# Patient Record
Sex: MALE | Race: WHITE | NOT HISPANIC OR LATINO | Employment: OTHER | URBAN - METROPOLITAN AREA
[De-identification: names, ages, dates, MRNs, and addresses within clinical notes are randomized per-mention and may not be internally consistent; named-entity substitution may affect disease eponyms.]

---

## 2020-09-03 ENCOUNTER — ASSISTED LIVING VISIT (OUTPATIENT)
Dept: GERIATRICS | Facility: CLINIC | Age: 85
End: 2020-09-03

## 2020-09-03 VITALS
HEIGHT: 68 IN | TEMPERATURE: 98.8 F | RESPIRATION RATE: 18 BRPM | WEIGHT: 166.2 LBS | BODY MASS INDEX: 25.19 KG/M2 | OXYGEN SATURATION: 98 % | DIASTOLIC BLOOD PRESSURE: 60 MMHG | SYSTOLIC BLOOD PRESSURE: 120 MMHG | HEART RATE: 60 BPM

## 2020-09-03 DIAGNOSIS — N40.0 BENIGN PROSTATIC HYPERPLASIA WITHOUT LOWER URINARY TRACT SYMPTOMS: ICD-10-CM
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
## 2020-09-03 DIAGNOSIS — M43.22 CERVICAL VERTEBRAL FUSION: ICD-10-CM

## 2020-09-03 DIAGNOSIS — F33.0 MILD EPISODE OF RECURRENT MAJOR DEPRESSIVE DISORDER (H): ICD-10-CM

## 2020-09-03 DIAGNOSIS — R29.6 RECURRENT FALLS: ICD-10-CM

## 2020-09-03 DIAGNOSIS — I48.20 CHRONIC ATRIAL FIBRILLATION (H): ICD-10-CM

## 2020-09-03 DIAGNOSIS — Z86.73 HISTORY OF CVA (CEREBROVASCULAR ACCIDENT): Primary | ICD-10-CM

## 2020-09-03 DIAGNOSIS — I69.952 FLACCID HEMIPLEGIA OF LEFT DOMINANT SIDE AS LATE EFFECT OF CEREBROVASCULAR DISEASE, UNSPECIFIED CEREBROVASCULAR DISEASE TYPE (H): ICD-10-CM

## 2020-09-03 DIAGNOSIS — S06.9X9D TRAUMATIC BRAIN INJURY WITH LOSS OF CONSCIOUSNESS, SUBSEQUENT ENCOUNTER: ICD-10-CM

## 2020-09-03 DIAGNOSIS — E03.9 HYPOTHYROIDISM, UNSPECIFIED TYPE: ICD-10-CM

## 2020-09-03 RX ORDER — TAMSULOSIN HYDROCHLORIDE 0.4 MG/1
0.4 CAPSULE ORAL DAILY
Qty: 30 CAPSULE | Refills: 11
Start: 2020-09-03 | End: 2020-12-18

## 2020-09-03 RX ORDER — WARFARIN SODIUM 1 MG/1
1 TABLET ORAL DAILY
Qty: 30 TABLET | Refills: 11
Start: 2020-09-03 | End: 2021-05-14

## 2020-09-03 RX ORDER — ATORVASTATIN CALCIUM 20 MG/1
20 TABLET, FILM COATED ORAL DAILY
Qty: 30 TABLET | Refills: 11
Start: 2020-09-03 | End: 2021-03-03

## 2020-09-03 RX ORDER — CITALOPRAM HYDROBROMIDE 20 MG/1
20 TABLET ORAL DAILY
Qty: 30 TABLET | Refills: 11
Start: 2020-09-03 | End: 2021-03-23

## 2020-09-03 RX ORDER — LEVOTHYROXINE SODIUM 75 UG/1
75 TABLET ORAL DAILY
Qty: 30 TABLET | Refills: 11
Start: 2020-09-03 | End: 2020-09-24

## 2020-09-03 RX ORDER — ACETAMINOPHEN 500 MG
1000 TABLET ORAL EVERY 8 HOURS PRN
Qty: 60 TABLET | Refills: 11
Start: 2020-09-03 | End: 2022-01-01

## 2020-09-03 ASSESSMENT — MIFFLIN-ST. JEOR: SCORE: 1413.38
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
## 2020-09-05 ASSESSMENT — MIFFLIN-ST. JEOR: SCORE: 1412.47

## 2020-09-08 ENCOUNTER — ASSISTED LIVING VISIT (OUTPATIENT)
Dept: GERIATRICS | Facility: CLINIC | Age: 85
End: 2020-09-08

## 2020-09-08 VITALS
TEMPERATURE: 98.8 F | DIASTOLIC BLOOD PRESSURE: 60 MMHG | OXYGEN SATURATION: 98 % | HEIGHT: 68 IN | HEART RATE: 60 BPM | RESPIRATION RATE: 18 BRPM | WEIGHT: 166 LBS | SYSTOLIC BLOOD PRESSURE: 120 MMHG | BODY MASS INDEX: 25.16 KG/M2

## 2020-09-08 DIAGNOSIS — I48.20 CHRONIC ATRIAL FIBRILLATION (H): Primary | ICD-10-CM

## 2020-09-08 DIAGNOSIS — Z79.01 LONG TERM CURRENT USE OF ANTICOAGULANT THERAPY: ICD-10-CM

## 2020-09-08 DIAGNOSIS — Z51.81 ENCOUNTER FOR THERAPEUTIC DRUG MONITORING: ICD-10-CM

## 2020-09-08 ASSESSMENT — MIFFLIN-ST. JEOR: SCORE: 1412.47

## 2020-09-08 NOTE — PROGRESS NOTES
"[place] [facility]  [facility] Medical Record Number: [id number]  Place of Service where encounter took place: [facility] ([facility] [[id number]]    HPI:    Sandeep Tapia is a 85 year old (10/27/1934), who is being seen today for an episodic care visit at the above location.  HPI information obtained from: facility chart records, facility staff, patient report and [facility] chart review. Today's concern is INR/Coumadin management for A. Fib    ROS/Subjective:  Bleeding Signs/Symptoms:  None  Thromboembolic Signs/Symptoms:  None  Medication Changes:  No  Dietary Changes:  No  Activity Changes: No  Bacterial/Viral Infection:  No  Missed Coumadin Doses:  None  On ASA: No  Other Concerns:  No    OBJECTIVE:  /60   Pulse 60   Temp 98.8  F (37.1  C)   Resp 18   Ht 1.727 m (5' 8\")   Wt 75.3 kg (166 lb)   SpO2 98%   BMI 25.24 kg/m    Last INR: 2.7 on 9/3  INR Today:  2.5  Current Dose:  5mg TWF, 4.5mg ROW  INR Flow sheet at [facility]:    ASSESSMENT:     Chronic atrial fibrillation (H)  Encounter for therapeutic drug monitoring  Long term current use of anticoagulant therapy  Therapeutic INR for goal of 2-3    PLAN:   Orders written by provider at facility  New Dose: No Change    Next INR: 9/21      Electronically signed by:  REBECCA Pisano CNP     "

## 2020-09-14 ENCOUNTER — ASSISTED LIVING VISIT (OUTPATIENT)
Dept: GERIATRICS | Facility: CLINIC | Age: 85
End: 2020-09-14

## 2020-09-14 VITALS
DIASTOLIC BLOOD PRESSURE: 60 MMHG | HEART RATE: 60 BPM | HEIGHT: 68 IN | TEMPERATURE: 98.8 F | OXYGEN SATURATION: 98 % | BODY MASS INDEX: 25.16 KG/M2 | SYSTOLIC BLOOD PRESSURE: 120 MMHG | RESPIRATION RATE: 18 BRPM | WEIGHT: 166 LBS

## 2020-09-14 DIAGNOSIS — Z79.01 LONG TERM CURRENT USE OF ANTICOAGULANT THERAPY: ICD-10-CM

## 2020-09-14 DIAGNOSIS — I48.20 CHRONIC ATRIAL FIBRILLATION (H): Primary | ICD-10-CM

## 2020-09-14 DIAGNOSIS — Z51.81 ENCOUNTER FOR THERAPEUTIC DRUG MONITORING: ICD-10-CM

## 2020-09-14 ASSESSMENT — MIFFLIN-ST. JEOR: SCORE: 1412.47

## 2020-09-14 NOTE — PROGRESS NOTES
"[place] [facility]  [place] Medical Record Number: [id number]  Place of Service where encounter took place: [facility] ([facility]) [[id number]]    HPI:    Sandeep Tapia is a 85 year old (10/27/1934), who is being seen today for an episodic care visit at the above location. HPI information obtained from: facility chart records, facility staff, patient report and [facility] chart review. Today's concern is INR/Coumadin management for A. Fib    ROS/Subjective:  Bleeding Signs/Symptoms:  None  Thromboembolic Signs/Symptoms:  None  Medication Changes:  No  Dietary Changes:  No  Activity Changes: No  Bacterial/Viral Infection:  No  Missed Coumadin Doses:  None  On ASA: No  Other Concerns:  No    OBJECTIVE:  /60   Pulse 60   Temp 98.8  F (37.1  C)   Resp 18   Ht 1.727 m (5' 8\")   Wt 75.3 kg (166 lb)   SpO2 98%   BMI 25.24 kg/m    Last INR: 2.5 on 9/8  INR Today:  2.8  Current Dose:  5mg TWF, 4.5mg ROW  INR Flow sheet at [facility]:    ASSESSMENT:     Chronic atrial fibrillation (H)  Long term current use of anticoagulant therapy  Encounter for therapeutic drug monitoring  Therapeutic INR for goal of 2-3    PLAN:   Orders written by provider at facility  New Dose: 2.5mg T/Th, 5mg ROW    Next INR: 9/21      Electronically signed by:  REBECCA Pisano CNP       "

## 2020-09-21 ENCOUNTER — TRANSFERRED RECORDS (OUTPATIENT)
Dept: HEALTH INFORMATION MANAGEMENT | Facility: CLINIC | Age: 85
End: 2020-09-21

## 2020-09-21 ENCOUNTER — ASSISTED LIVING VISIT (OUTPATIENT)
Dept: GERIATRICS | Facility: CLINIC | Age: 85
End: 2020-09-21

## 2020-09-21 VITALS
SYSTOLIC BLOOD PRESSURE: 120 MMHG | OXYGEN SATURATION: 98 % | TEMPERATURE: 98.8 F | WEIGHT: 166 LBS | HEART RATE: 60 BPM | RESPIRATION RATE: 18 BRPM | BODY MASS INDEX: 25.16 KG/M2 | HEIGHT: 68 IN | DIASTOLIC BLOOD PRESSURE: 60 MMHG

## 2020-09-21 DIAGNOSIS — Z79.01 LONG TERM CURRENT USE OF ANTICOAGULANT THERAPY: ICD-10-CM

## 2020-09-21 DIAGNOSIS — I48.20 CHRONIC ATRIAL FIBRILLATION (H): Primary | ICD-10-CM

## 2020-09-21 DIAGNOSIS — Z51.81 ENCOUNTER FOR THERAPEUTIC DRUG MONITORING: ICD-10-CM

## 2020-09-21 LAB
ANION GAP SERPL CALCULATED.3IONS-SCNC: 8 MMOL/L (ref 7–16)
BUN SERPL-MCNC: 23 MG/DL (ref 7–26)
CALCIUM SERPL-MCNC: 8.5 MG/DL (ref 8.4–10.4)
CHLORIDE SERPLBLD-SCNC: 108 MMOL/L (ref 98–109)
CO2 SERPL-SCNC: 22 MMOL/L (ref 20–29)
CREAT SERPL-MCNC: 1.58 MG/DL (ref 0.73–1.18)
GFR SERPL CREATININE-BSD FRML MDRD: 39 ML/MIN/1.73M2
GLUCOSE SERPL-MCNC: 93 MG/DL (ref 70–100)
HEMOGLOBIN: 9.9 G/DL (ref 13.5–17.5)
POTASSIUM SERPL-SCNC: 4 MMOL/L (ref 3.5–5.1)
SODIUM SERPL-SCNC: 138 MMOL/L (ref 136–145)
TSH SERPL-ACNC: 0.79 UIU/ML (ref 0.3–4.5)

## 2020-09-21 ASSESSMENT — MIFFLIN-ST. JEOR: SCORE: 1412.47

## 2020-09-21 NOTE — PROGRESS NOTES
"[place] [facility]  [place] Medical Record Number: [id number]  Place of Service where encounter took place: [facility] ([facility]) [[id number]]    HPI: Sandeep Tapia is a 85 year old (10/27/1934), who is being seen today for an episodic care visit at the above location. HPI information obtained from: facility chart records, facility staff, patient report and [facility] chart review. Today's concern is INR/Coumadin management for A. Fib    ROS/Subjective:  Bleeding Signs/Symptoms:  None  Thromboembolic Signs/Symptoms:  None  Medication Changes:  No  Dietary Changes:  No  Activity Changes: No  Bacterial/Viral Infection:  No  Missed Coumadin Doses:  None  On ASA: No  Other Concerns:  No    OBJECTIVE:  /60   Pulse 60   Temp 98.8  F (37.1  C)   Resp 18   Ht 1.727 m (5' 8\")   Wt 75.3 kg (166 lb)   SpO2 98%   BMI 25.24 kg/m    Last INR: 2.8 on 9/8  INR Today:  2.5  Current Dose:  2.5mg T/Th, 5mg ROW  INR Flow sheet at [facility]:    ASSESSMENT:     Chronic atrial fibrillation (H)  Encounter for therapeutic drug monitoring  Long term current use of anticoagulant therapy  Therapeutic INR for goal of 2-3    PLAN:   Orders written by provider at facility  New Dose: No Change    Next INR: 10/5      Electronically signed by:  REBECCA Pisano CNP     "

## 2020-09-24 ENCOUNTER — ASSISTED LIVING VISIT (OUTPATIENT)
Dept: GERIATRICS | Facility: CLINIC | Age: 85
End: 2020-09-24

## 2020-09-24 VITALS
WEIGHT: 166 LBS | DIASTOLIC BLOOD PRESSURE: 60 MMHG | SYSTOLIC BLOOD PRESSURE: 120 MMHG | HEART RATE: 60 BPM | TEMPERATURE: 98.8 F | HEIGHT: 68 IN | OXYGEN SATURATION: 98 % | BODY MASS INDEX: 25.16 KG/M2 | RESPIRATION RATE: 18 BRPM

## 2020-09-24 DIAGNOSIS — E03.9 HYPOTHYROIDISM, UNSPECIFIED TYPE: Primary | ICD-10-CM

## 2020-09-24 DIAGNOSIS — N18.30 CKD (CHRONIC KIDNEY DISEASE) STAGE 3, GFR 30-59 ML/MIN (H): ICD-10-CM

## 2020-09-24 DIAGNOSIS — D64.9 ANEMIA, UNSPECIFIED TYPE: ICD-10-CM

## 2020-09-24 PROBLEM — S06.9XAS COGNITIVE DEFICIT AS LATE EFFECT OF TRAUMATIC BRAIN INJURY (H): Status: ACTIVE | Noted: 2020-09-24

## 2020-09-24 PROBLEM — R41.89 COGNITIVE DEFICIT AS LATE EFFECT OF TRAUMATIC BRAIN INJURY (H): Status: ACTIVE | Noted: 2020-09-24

## 2020-09-24 RX ORDER — LEVOTHYROXINE SODIUM 50 UG/1
50 TABLET ORAL DAILY
Qty: 30 TABLET | Refills: 11
Start: 2020-09-24 | End: 2022-01-01 | Stop reason: ALTCHOICE

## 2020-09-24 ASSESSMENT — MIFFLIN-ST. JEOR: SCORE: 1412.47

## 2020-09-24 NOTE — PROGRESS NOTES
"[place] [facility]  [place] Medical Record Number: [id number]  [facility] Service where encounter took place[facility] [[id number]]  Chief Complaint   Patient presents with     RECHECK       HPI:    Sandeep Tapia is a 85 year old (10/27/1934), who is being seen today for an episodic care visit. HPI information obtained from: facility chart records, facility staff, patient report and [facility] chart review. Today's concern is:     Hypothyroidism, unspecified type  CKD (chronic kidney disease) stage 3, GFR 30-59 ml/min  Anemia, unspecified type     Patient seen today for follow up, SLUMS 21/30, uses cane to ambulate, labs on 9/21 reviewed with Hgb 9.9, TSH 0.79, GFR 39, is asymptomatic without weakness/lethargy, skin tone WNL.    Past Medical and Surgical History reviewed in Epic today.    MEDICATIONS:    Current Outpatient Medications   Medication Sig Dispense Refill     levothyroxine (SYNTHROID/LEVOTHROID) 50 MCG tablet Take 1 tablet (50 mcg) by mouth daily 30 tablet 11     acetaminophen (TYLENOL) 500 MG tablet Take 2 tablets (1,000 mg) by mouth every 8 hours as needed for mild pain 60 tablet 11     atorvastatin (LIPITOR) 20 MG tablet Take 1 tablet (20 mg) by mouth daily 30 tablet 11     citalopram (CELEXA) 20 MG tablet Take 1 tablet (20 mg) by mouth daily 30 tablet 11     tamsulosin (FLOMAX) 0.4 MG capsule Take 1 capsule (0.4 mg) by mouth daily 30 capsule 11     warfarin ANTICOAGULANT (COUMADIN) 1 MG tablet Take 1 tablet (1 mg) by mouth daily 30 tablet 11     REVIEW OF SYSTEMS:  4 point ROS including Respiratory, CV, GI and , other than that noted in the HPI,  is negative    Objective:  /60   Pulse 60   Temp 98.8  F (37.1  C)   Resp 18   Ht 1.727 m (5' 8\")   Wt 75.3 kg (166 lb)   SpO2 98%   BMI 25.24 kg/m    Exam:  GENERAL APPEARANCE:  in no distress, appears healthy  ENT:  normal hearing acuity  RESP:  no respiratory distress  CV:  peripheral edema mild+ in " lower legs  ABDOMEN:  no distension  M/S:   Gait and station abnormal using cane  SKIN:  Inspection of skin and subcutaneous tissue baseline  NEURO:   Examination of sensation by touch normal  PSYCH:  oriented X 3, memory impaired     Labs:   Labs done in SNF are in [facility] EPIC. Please refer to them using EPIC/Care Everywhere.    ASSESSMENT/PLAN:  Hypothyroidism, unspecified type  -TSH 9/21 was 0/79  -goal range 2-6  -decrease levothyroxine from 75 to 50mcg  -follow up TSH in 6-12 months    CKD (chronic kidney disease) stage 3, GFR 30-59 ml/min  -labs 9/21 Creat 1.58, GFR 39  -dose medications renally as possible  -consider reduction of lipitor to 10mg  -follow up BMP in 6-12 months    Anemia, unspecified type  -Hgb on 9/21 was 9.9  -asymptomatic, no previous records  -will contact family RE: Hx of anemia      Electronically signed by:  REBECCA Pisano CNP

## 2020-09-29 VITALS
SYSTOLIC BLOOD PRESSURE: 120 MMHG | DIASTOLIC BLOOD PRESSURE: 60 MMHG | TEMPERATURE: 98.8 F | HEART RATE: 60 BPM | BODY MASS INDEX: 25.16 KG/M2 | RESPIRATION RATE: 18 BRPM | WEIGHT: 166 LBS | OXYGEN SATURATION: 98 % | HEIGHT: 68 IN

## 2020-09-29 NOTE — PROGRESS NOTES
"[place] [facility]  [place] Medical Record Number: [id number]  Place of Service where encounter took place: [facility] ([facility]) [[id number]]    HPI:    Sandeep Tapia is a 85 year old (10/27/1934), who is being seen today for an episodic care visit at the above location. HPI information obtained from: facility chart records, facility staff, patient report and [facility] chart review. Today's concern is INR/Coumadin management for A. Fib    ROS/Subjective:  Bleeding Signs/Symptoms:  None  Thromboembolic Signs/Symptoms:  None  Medication Changes:  No  Dietary Changes:  No  Activity Changes: No  Bacterial/Viral Infection:  No  Missed Coumadin Doses:  None  On ASA: No  Other Concerns:  No    OBJECTIVE:  /60   Pulse 60   Temp 98.8  F (37.1  C)   Resp 18   Ht 1.727 m (5' 8\")   Wt 75.3 kg (166 lb)   SpO2 98%   BMI 25.24 kg/m    Last INR: 2.6 on 9/21  INR Today:  2.6  Current Dose:  2.5mg T/Th, 5mg ROW  INR Flow sheet at [facility]:    ASSESSMENT:     Chronic atrial fibrillation (H)  Encounter for therapeutic drug monitoring  Long term current use of anticoagulant therapy  Therapeutic INR for goal of 2-3    PLAN:   Orders written by provider at facility  New Dose: No Change    Next INR: 10/5      Electronically signed by:  REBECCA Pisano CNP     "

## 2020-09-30 ENCOUNTER — DOCUMENTATION ONLY (OUTPATIENT)
Dept: OTHER | Facility: CLINIC | Age: 85
End: 2020-09-30

## 2020-09-30 ENCOUNTER — ASSISTED LIVING VISIT (OUTPATIENT)
Dept: GERIATRICS | Facility: CLINIC | Age: 85
End: 2020-09-30

## 2020-09-30 DIAGNOSIS — Z51.81 ENCOUNTER FOR THERAPEUTIC DRUG MONITORING: ICD-10-CM

## 2020-09-30 DIAGNOSIS — I48.20 CHRONIC ATRIAL FIBRILLATION (H): Primary | ICD-10-CM

## 2020-09-30 DIAGNOSIS — Z79.01 LONG TERM CURRENT USE OF ANTICOAGULANT THERAPY: ICD-10-CM

## 2020-09-30 ASSESSMENT — MIFFLIN-ST. JEOR: SCORE: 1412.47

## 2020-10-01 ASSESSMENT — MIFFLIN-ST. JEOR
SCORE: 1385.7

## 2020-10-05 ENCOUNTER — ASSISTED LIVING VISIT (OUTPATIENT)
Dept: GERIATRICS | Facility: CLINIC | Age: 85
End: 2020-10-05
Payer: COMMERCIAL

## 2020-10-05 VITALS
SYSTOLIC BLOOD PRESSURE: 120 MMHG | OXYGEN SATURATION: 98 % | RESPIRATION RATE: 18 BRPM | WEIGHT: 166 LBS | DIASTOLIC BLOOD PRESSURE: 60 MMHG | BODY MASS INDEX: 25.16 KG/M2 | TEMPERATURE: 98.8 F | HEART RATE: 60 BPM | HEIGHT: 68 IN

## 2020-10-05 DIAGNOSIS — I48.20 CHRONIC ATRIAL FIBRILLATION (H): Primary | ICD-10-CM

## 2020-10-05 DIAGNOSIS — Z51.81 ENCOUNTER FOR THERAPEUTIC DRUG MONITORING: ICD-10-CM

## 2020-10-05 DIAGNOSIS — Z79.01 LONG TERM CURRENT USE OF ANTICOAGULANT THERAPY: ICD-10-CM

## 2020-10-05 ASSESSMENT — MIFFLIN-ST. JEOR: SCORE: 1412.47

## 2020-10-05 NOTE — PROGRESS NOTES
"[place] [facility]  [place] Medical Record Number: [id number]  Place of Service where encounter took place: [facility] ([facility]) [[id number]]    HPI:    Sandeep Tapia is a 85 year old (10/27/1934), who is being seen today for an episodic care visit at the above location. HPI information obtained from: facility chart records, facility staff, patient report and [facility] chart review. Today's concern is INR/Coumadin management for A. Fib    ROS/Subjective:  Bleeding Signs/Symptoms:  None  Thromboembolic Signs/Symptoms:  None  Medication Changes:  No  Dietary Changes:  No  Activity Changes: No  Bacterial/Viral Infection:  No  Missed Coumadin Doses:  None  On ASA: No  Other Concerns:  No    OBJECTIVE:  /60   Pulse 60   Temp 98.8  F (37.1  C)   Resp 18   Ht 1.727 m (5' 8\")   Wt 75.3 kg (166 lb)   SpO2 98%   BMI 25.24 kg/m    Last INR: 2.6 on 9/28  INR Today:  2,3  Current Dose:  2.5mg T/Th, 5mg ROW  INR Flow sheet at [facility]:    ASSESSMENT:     Chronic atrial fibrillation (H)  Encounter for therapeutic drug monitoring  Long term current use of anticoagulant therapy  Therapeutic INR for goal of 2-3    PLAN:   Orders written by provider at facility  New Dose: Same    Next INR: 10/19      Electronically signed by:  REBECCA Pisano CNP     "

## 2020-10-19 ENCOUNTER — ASSISTED LIVING VISIT (OUTPATIENT)
Dept: GERIATRICS | Facility: CLINIC | Age: 85
End: 2020-10-19
Payer: COMMERCIAL

## 2020-10-19 VITALS
DIASTOLIC BLOOD PRESSURE: 60 MMHG | OXYGEN SATURATION: 98 % | BODY MASS INDEX: 25.16 KG/M2 | RESPIRATION RATE: 18 BRPM | SYSTOLIC BLOOD PRESSURE: 120 MMHG | WEIGHT: 166 LBS | HEIGHT: 68 IN | TEMPERATURE: 98.8 F | HEART RATE: 60 BPM

## 2020-10-19 DIAGNOSIS — I48.20 CHRONIC ATRIAL FIBRILLATION (H): Primary | ICD-10-CM

## 2020-10-19 DIAGNOSIS — Z51.81 ENCOUNTER FOR THERAPEUTIC DRUG MONITORING: ICD-10-CM

## 2020-10-19 DIAGNOSIS — Z79.01 LONG TERM CURRENT USE OF ANTICOAGULANT THERAPY: ICD-10-CM

## 2020-10-19 NOTE — PROGRESS NOTES
"[place] [facility]  [place] Medical Record Number: [id number]  Place of Service where encounter took place: [facility] ([facility]) [[id number]]    HPI:    Sandeep Tapia is a 85 year old (10/27/1934), who is being seen today for an episodic care visit at the above location. HPI information obtained from: facility chart records, facility staff, patient report and [facility] chart review. Today's concern is INR/Coumadin management for A. Fib    ROS/Subjective:  Bleeding Signs/Symptoms:  None  Thromboembolic Signs/Symptoms:  None  Medication Changes:  No  Dietary Changes:  No  Activity Changes: No  Bacterial/Viral Infection:  No  Missed Coumadin Doses:  None  On ASA: No  Other Concerns:  No    OBJECTIVE:  /60   Pulse 60   Temp 98.8  F (37.1  C)   Resp 18   Ht 1.727 m (5' 8\")   Wt 75.3 kg (166 lb)   SpO2 98%   BMI 25.24 kg/m    Last INR: 2.1 on 10/12  INR Today:  1.9  Current Dose:  2.5mg T/Th, 5mg ROW  INR Flow sheet at [facility]:    ASSESSMENT:     Chronic atrial fibrillation (H)  Encounter for therapeutic drug monitoring  Long term current use of anticoagulant therapy  Therapeutic INR for goal of 2-3    PLAN:   Orders written by provider at facility  New Dose: 2.5mg W, 5mg ROW    Next INR: 11/2      Electronically signed by:  REBECCA Pisano CNP     "

## 2020-10-20 ENCOUNTER — VIRTUAL VISIT (OUTPATIENT)
Dept: GERIATRICS | Facility: CLINIC | Age: 85
End: 2020-10-20
Payer: COMMERCIAL

## 2020-10-20 ENCOUNTER — HOSPITAL ENCOUNTER (EMERGENCY)
Facility: CLINIC | Age: 85
Discharge: HOME | End: 2020-10-20
Attending: EMERGENCY MEDICINE | Admitting: EMERGENCY MEDICINE
Payer: COMMERCIAL

## 2020-10-20 VITALS
BODY MASS INDEX: 25.16 KG/M2 | HEIGHT: 68 IN | OXYGEN SATURATION: 98 % | WEIGHT: 166 LBS | TEMPERATURE: 98.8 F | DIASTOLIC BLOOD PRESSURE: 60 MMHG | RESPIRATION RATE: 18 BRPM | SYSTOLIC BLOOD PRESSURE: 120 MMHG | HEART RATE: 60 BPM

## 2020-10-20 VITALS
RESPIRATION RATE: 18 BRPM | DIASTOLIC BLOOD PRESSURE: 60 MMHG | OXYGEN SATURATION: 98 % | SYSTOLIC BLOOD PRESSURE: 120 MMHG | HEIGHT: 68 IN | HEART RATE: 60 BPM | BODY MASS INDEX: 25.16 KG/M2 | TEMPERATURE: 98.8 F | WEIGHT: 166 LBS

## 2020-10-20 VITALS
SYSTOLIC BLOOD PRESSURE: 158 MMHG | TEMPERATURE: 98.1 F | OXYGEN SATURATION: 100 % | HEART RATE: 62 BPM | DIASTOLIC BLOOD PRESSURE: 62 MMHG | RESPIRATION RATE: 18 BRPM

## 2020-10-20 DIAGNOSIS — S01.311A LACERATION OF RIGHT EAR LOBE, INITIAL ENCOUNTER: ICD-10-CM

## 2020-10-20 DIAGNOSIS — G44.309 POST-TRAUMATIC HEADACHE, NOT INTRACTABLE, UNSPECIFIED CHRONICITY PATTERN: ICD-10-CM

## 2020-10-20 DIAGNOSIS — S06.9XAS COGNITIVE DEFICIT AS LATE EFFECT OF TRAUMATIC BRAIN INJURY (H): Primary | ICD-10-CM

## 2020-10-20 DIAGNOSIS — S56.912A STRAIN OF LEFT FOREARM, INITIAL ENCOUNTER: ICD-10-CM

## 2020-10-20 DIAGNOSIS — S01.311A LACERATION OF RIGHT EAR, INITIAL ENCOUNTER: ICD-10-CM

## 2020-10-20 DIAGNOSIS — R41.89 COGNITIVE DEFICIT AS LATE EFFECT OF TRAUMATIC BRAIN INJURY (H): Primary | ICD-10-CM

## 2020-10-20 DIAGNOSIS — S00.432A HEMATOMA OF LEFT EAR, INITIAL ENCOUNTER: ICD-10-CM

## 2020-10-20 PROCEDURE — 99207 PR CDG-MDM COMPONENT: MEETS LOW - DOWN CODED: CPT | Performed by: NURSE PRACTITIONER

## 2020-10-20 PROCEDURE — 250N000011 HC RX IP 250 OP 636: Performed by: EMERGENCY MEDICINE

## 2020-10-20 PROCEDURE — 99283 EMERGENCY DEPT VISIT LOW MDM: CPT

## 2020-10-20 PROCEDURE — 12013 RPR F/E/E/N/L/M 2.6-5.0 CM: CPT

## 2020-10-20 PROCEDURE — 90715 TDAP VACCINE 7 YRS/> IM: CPT | Performed by: EMERGENCY MEDICINE

## 2020-10-20 PROCEDURE — 250N000013 HC RX MED GY IP 250 OP 250 PS 637: Performed by: EMERGENCY MEDICINE

## 2020-10-20 PROCEDURE — 90471 IMMUNIZATION ADMIN: CPT | Performed by: EMERGENCY MEDICINE

## 2020-10-20 RX ORDER — CEPHALEXIN 500 MG/1
500 CAPSULE ORAL ONCE
Status: COMPLETED | OUTPATIENT
Start: 2020-10-20 | End: 2020-10-20

## 2020-10-20 RX ORDER — CEPHALEXIN 500 MG/1
500 CAPSULE ORAL 4 TIMES DAILY
Qty: 28 CAPSULE | Refills: 0 | Status: SHIPPED | OUTPATIENT
Start: 2020-10-20 | End: 2020-10-27

## 2020-10-20 RX ADMIN — CLOSTRIDIUM TETANI TOXOID ANTIGEN (FORMALDEHYDE INACTIVATED), CORYNEBACTERIUM DIPHTHERIAE TOXOID ANTIGEN (FORMALDEHYDE INACTIVATED), BORDETELLA PERTUSSIS TOXOID ANTIGEN (GLUTARALDEHYDE INACTIVATED), BORDETELLA PERTUSSIS FILAMENTOUS HEMAGGLUTININ ANTIGEN (FORMALDEHYDE INACTIVATED), BORDETELLA PERTUSSIS PERTACTIN ANTIGEN, AND BORDETELLA PERTUSSIS FIMBRIAE 2/3 ANTIGEN 0.5 ML: 5; 2; 2.5; 5; 3; 5 INJECTION, SUSPENSION INTRAMUSCULAR at 12:39

## 2020-10-20 RX ADMIN — CEPHALEXIN 500 MG: 500 CAPSULE ORAL at 12:39

## 2020-10-20 ASSESSMENT — ENCOUNTER SYMPTOMS
ARTHRALGIAS: 1
NECK STIFFNESS: 1
WOUND: 1
ABDOMINAL PAIN: 0

## 2020-10-20 ASSESSMENT — MIFFLIN-ST. JEOR
SCORE: 1412.47
SCORE: 1412.47

## 2020-10-20 NOTE — PROGRESS NOTES
"[place] [facility]   Sandeep Tapia is being evaluated via a billable video visit.   The patient has been notified of following:  \"This video visit will be conducted via a call between you and your provider. We have found that certain health care needs can be provided without the need for an in-person physical exam.  This service lets us provide the care you need with a video conversation. If during the course of the call the provider feels a video visit is not appropriate, you will not be charged for this service.\"   The provider has received verbal consent for a Video Visit from the patient or first contact? Yes  Patient  or facility staff would like the video invitation sent by: N/A   Video Start Time: 1101  [place] Medical Record Number: [id number]  Place of Location at the time of visit: [facility]   Chief Complaint   Patient presents with     Video Visit     HPI: Sandeep Tapia is a 85 year old (10/27/1934), who is being seen today for a visit. HPI information obtained from: facility chart records, facility staff, patient report and [facility] chart review. 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     Past Medical and Surgical History reviewed in Epic today.  MEDICATIONS:    Current Outpatient Medications   Medication Sig Dispense Refill     acetaminophen (TYLENOL) 500 MG tablet Take 2 tablets (1,000 mg) by mouth every 8 hours " "as needed for mild pain 60 tablet 11     atorvastatin (LIPITOR) 20 MG tablet Take 1 tablet (20 mg) by mouth daily 30 tablet 11     cephALEXin (KEFLEX) 500 MG capsule Take 1 capsule (500 mg) by mouth 4 times daily for 7 days 28 capsule 0     citalopram (CELEXA) 20 MG tablet Take 1 tablet (20 mg) by mouth daily 30 tablet 11     levothyroxine (SYNTHROID/LEVOTHROID) 50 MCG tablet Take 1 tablet (50 mcg) by mouth daily 30 tablet 11     tamsulosin (FLOMAX) 0.4 MG capsule Take 1 capsule (0.4 mg) by mouth daily 30 capsule 11     warfarin ANTICOAGULANT (COUMADIN) 1 MG tablet Take 1 tablet (1 mg) by mouth daily 30 tablet 11     REVIEW OF SYSTEMS: Limited secondary to cognitive impairment but today pt reports I think I am fine  Objective: /60   Pulse 60   Temp 98.8  F (37.1  C)   Resp 18   Ht 1.727 m (5' 8\")   Wt 75.3 kg (166 lb)   SpO2 98%   BMI 25.24 kg/m    Limited visit exam done given COVID-19 precautions.   Exam: as in H&P, otherwise deferred r/t covid  Labs:   Labs done in SNF are in [place] EPIC. Please refer to them using [employer]/Care Everywhere.    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

## 2020-10-20 NOTE — PROGRESS NOTES
[place] [facility]  [place] Medical Record Number: [id number]  Place of Service where encounter took place: [facility] ([facility]) [[id number]]  Chief Complaint   Patient presents with     RECHECK       HPI:    Sandeep Tapia is a 85 year old (10/27/1934), who is being seen today for an episodic care visit. HPI information obtained from: facility chart records, facility staff, patient report and [facility] chart review. Today's concern is:     Fall, subsequent encounter  Laceration of right ear lobe, subsequent encounter  Hematoma of left ear, subsequent encounter     Patient on 10/20 slipped/fell and hit R ear on door, split ear lobe posterior and anterior plus has large hematoma at ana laura/antihelix area that was unable to be drained, sent to ER for sutures, pain+ with exam and cleaning up this am, no s/s infection, no additional discharge, hearing intact, concerned for area and cares due to dementia.    Past Medical and Surgical History reviewed in Epic today.    MEDICATIONS:    Current Outpatient Medications   Medication Sig Dispense Refill     acetaminophen (TYLENOL) 500 MG tablet Take 2 tablets (1,000 mg) by mouth every 8 hours as needed for mild pain 60 tablet 11     atorvastatin (LIPITOR) 20 MG tablet Take 1 tablet (20 mg) by mouth daily 30 tablet 11     cephALEXin (KEFLEX) 500 MG capsule Take 1 capsule (500 mg) by mouth 4 times daily for 7 days 28 capsule 0     citalopram (CELEXA) 20 MG tablet Take 1 tablet (20 mg) by mouth daily 30 tablet 11     levothyroxine (SYNTHROID/LEVOTHROID) 50 MCG tablet Take 1 tablet (50 mcg) by mouth daily 30 tablet 11     tamsulosin (FLOMAX) 0.4 MG capsule Take 1 capsule (0.4 mg) by mouth daily 30 capsule 11     warfarin ANTICOAGULANT (COUMADIN) 1 MG tablet Take 1 tablet (1 mg) by mouth daily 30 tablet 11     REVIEW OF SYSTEMS:  4 point ROS including Respiratory, CV, GI and , other than that noted in the HPI,  is negative    Objective:  /60    "Pulse 60   Temp 98.8  F (37.1  C)   Resp 18   Ht 1.727 m (5' 8\")   Wt 75.3 kg (166 lb)   SpO2 98%   BMI 25.24 kg/m    Exam:  GENERAL APPEARANCE:  in no distress, appears healthy  ENT:  Mouth and posterior oropharynx normal, moist mucous membranes  RESP:  lungs clear to auscultation   CV:  no edema, rate-normal  ABDOMEN:  bowel sounds normal  M/S:   Gait and station abnormal using cane, unsteady  SKIN:  Inspection of skin and subcutaneous tissueas in HPI  NEURO:   Examination of sensation by touch normal  PSYCH:  oriented X 3, memory impaired     Labs:   Labs done in SNF are in [place] EPIC. Please refer to them using [employer]/Care Everywhere.    ASSESSMENT/PLAN:  Fall, subsequent encounter  Laceration of right ear lobe, subsequent encounter  Hematoma of left ear, subsequent encounter  -add APAP 1000mg BID x7 days  -continue keflex 500mg QID x7 days total  -staff to remove sutures on 10/29  -family to decide follow up ENT  as suggested by ER  -daily dressing changes; cleanse, apply bacitracin to suture areas, cover ABD, wrap kerlix       Electronically signed by:  REBECCA Pisano CNP     "

## 2020-10-20 NOTE — ED PROVIDER NOTES
History     Chief Complaint:  Ear Injury     The history is provided by the patient.      Sandeep Tapia is a 85 year old male on Coumadin with a history of CKD, stroke, and recurrent falls, who presents via EMS for evaluation after fall. The patient reports that he was urinating this morning, and lost his balance when he turned around. He stumbled and hit his head as he fell against the wall or towel bar, but the patient does not remember specifically. Patient denies falling down to the ground or any loss of consciousness. He grabbed his cane, with which he ambulates due to balance issues, and started walking back to his bedroom when he realized he was bleeding from his right ear. The patient applied a cold wet towel to his ear to help control the bleeding. He called EMS when he was still in pain after returning to bed. Here, the patient complains of left wrist pain in addition to right ear pain, but notes that he is primarily concerned with his ear. He does have laceration to his right ear from the fall. He also mentions trouble with turning his head, but this is at baseline for him. The patient denies any chest or abdominal pain. No immunization records on file regarding tetanus status.     Allergies:  No Known Drug Allergies    Medications:    Atorvastatin  Citalopram  Levothyroxine  Tamsulosin  Coumadin    Past Medical History:    CKD stage 3  Anemia  Recurrent falls  Benign prostatic hyperplasia  Hypothyroidism  Chronic atrial fibrillation  TBI with LOSS OF CONSCIOUSNESS   Flaccid hemiplegia of left dominant side  CVA    Past Surgical History:    History reviewed. No pertinent surgical history.    Family History:    History reviewed. No pertinent family history.     Social History:  The patient was accompanied to the ED by EMS.  Smoking Status: Former smoker  Smokeless Tobacco: Never used  Alcohol Use: Not currently  Marital Status:      Review of Systems   HENT: Positive for ear pain (R ear).     Cardiovascular: Negative for chest pain.   Gastrointestinal: Negative for abdominal pain.   Musculoskeletal: Positive for arthralgias (L wrist) and neck stiffness (at baseline per patient).   Skin: Positive for wound (R ear).   Neurological: Negative for syncope.   All other systems reviewed and are negative.    Physical Exam     Patient Vitals for the past 24 hrs:   BP Temp Temp src Pulse Resp SpO2   10/20/20 1027 (!) 158/62 98.1  F (36.7  C) Oral 62 18 100 %       Physical Exam  Constitutional: Elderly white male, supine.  HENT: Right ear 3 cm full thickness laceration of lobule anteriorally and 2 cm laceration of lobule posteriorally. Large central hematoma within ana laura. EAC reveals wax. Diffuse swelling of external ear.  Eyes: EOM are normal. Pupils are equal, round, and reactive to light.   Neck: Normal range of motion. No JVD present. No cervical adenopathy. No posterior midline tenderness.  Cardiovascular: Regular rhythm.  Exam reveals no gallop and no friction rub.    No murmur heard.  Pulmonary/Chest: Bilateral breath sounds normal. No wheezes, rhonchi or rales.  Abdominal: Soft. No tenderness. No rebound or guarding.   Musculoskeletal: No edema.  no Left wrist or snuffbox tenderness. Mild tenderness of proximal extensor tendons without any bony tenderness.  Lymphadenopathy: No lymphadenopathy.   Neurological: Alert and oriented to person, place, and time. Normal strength. Coordination normal. GCS 15.  Skin: Skin is warm and dry. No rash noted. No erythema.     Emergency Department Course     Procedures  Ear laceration repair  5 cm total  0.5 % Sensorcaine was used for used for an aural block. Once the block was obtained, the posterior laceration was copiously irrigated. Cartilage was seen but did not appear to be torn. After irrigation, this was closed with 5.0 Ethylon. The front of the lobule was then examined. Again, cartilage was seen and exposed but did not appear to be torn. This was copiously  irrigated and closed with 5.0 Ethylon. For the hematoma in the ana laura I made a stab incision to attempt and drain blood, however, the hematoma had already solidified. Bacitracin, adaptic gauze, and a mastoid dressing was then placed.      Interventions:  1239 Keflex 500 mg PO  1239 Adacel 0.5 mLs IM    Emergency Department Course:  Past medical records, nursing notes, and vitals reviewed.    1031 I performed an exam of the patient as documented above.     1129 I performed a laceration repair on the patient's right ear, as noted above.    1215 I rechecked the patient and discussed the results of his workup thus far.     Findings and plan explained to the Patient. Patient discharged home with instructions regarding supportive care, medications, and reasons to return. The importance of close follow-up was reviewed. The patient was prescribed Keflex.    I personally answered all related questions prior to discharge.     Impression & Plan         Medical Decision Making:  Sandeep Tapia is a 85 year old male who slipped and hit his right ear against a door which caused a laceration and swelling. He stats he did not fall. He has a little soreness in the left forearm because he tried to push off when he started to lean. He has no tenderness in that wrist whatsoever and there is some mild tenderness of the extensor tendons of the forearm but no sign of fracture. Patient was given a Keflex tablet. His tetanus status was checked and updated.I recommended that he stay on the Keflex, use cold compresses, and follow-up with ENT in two days to have mastoid dressing removed and the wound checked. I referred him to Dr. Valentin for this.  sutures should come out in  one week.    Diagnosis:    ICD-10-CM    1. Laceration of right ear lobe, initial encounter  S01.311A    2. Hematoma of left ear, initial encounter  S00.432A    3. Strain of left forearm, initial encounter  S56.912A        Disposition:  Discharged to home.    Discharge  Medications:  New Prescriptions    CEPHALEXIN (KEFLEX) 500 MG CAPSULE    Take 1 capsule (500 mg) by mouth 4 times daily for 7 days       Scribe Disclosure:  I, Comfort Ovalle, am serving as a scribe at 10:31 AM on 10/20/2020 to document services personally performed by Mark Lopez MD based on my observations and the provider's statements to me.     Comfort Ovalle  10/20/2020   [facility] EMERGENCY DEPT       Mark Lopez MD  10/20/20 8559

## 2020-10-20 NOTE — ED NOTES
I called [facility] to inform that patient will be returning.  Report given to JENNIFER. I also spoke with daughter Saranya.

## 2020-10-20 NOTE — ED AVS SNAPSHOT
[facility] Emergency Dept  [address] [address]  Phone: [phone]  Fax: [phone]                                    Sandeep Tapia   MRN: [id number]    Department: [facility] Emergency Dept   Date of Visit: 10/20/2020           After Visit Summary Signature Page    I have received my discharge instructions, and my questions have been answered. I have discussed any challenges I see with this plan with the nurse or doctor.    ..........................................................................................................................................  Patient/Patient Representative Signature      ..........................................................................................................................................  Patient Representative Print Name and Relationship to Patient    ..................................................               ................................................  Date                                   Time    ..........................................................................................................................................  Reviewed by Signature/Title    ...................................................              ..............................................  Date                                               Time          22EPIC Rev 08/18

## 2020-10-21 ENCOUNTER — ASSISTED LIVING VISIT (OUTPATIENT)
Dept: GERIATRICS | Facility: CLINIC | Age: 85
End: 2020-10-21
Payer: COMMERCIAL

## 2020-10-21 DIAGNOSIS — S01.311D LACERATION OF RIGHT EAR LOBE, SUBSEQUENT ENCOUNTER: ICD-10-CM

## 2020-10-21 DIAGNOSIS — S00.432D HEMATOMA OF LEFT EAR, SUBSEQUENT ENCOUNTER: ICD-10-CM
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
## 2020-11-02 ENCOUNTER — ASSISTED LIVING VISIT (OUTPATIENT)
Dept: GERIATRICS | Facility: CLINIC | Age: 85
End: 2020-11-02
Payer: COMMERCIAL

## 2020-11-02 VITALS
BODY MASS INDEX: 24.43 KG/M2 | WEIGHT: 161.2 LBS | DIASTOLIC BLOOD PRESSURE: 67 MMHG | HEIGHT: 68 IN | TEMPERATURE: 97.5 F | OXYGEN SATURATION: 96 % | SYSTOLIC BLOOD PRESSURE: 125 MMHG | HEART RATE: 58 BPM | RESPIRATION RATE: 18 BRPM

## 2020-11-02 DIAGNOSIS — Z79.01 LONG TERM CURRENT USE OF ANTICOAGULANT THERAPY: ICD-10-CM

## 2020-11-02 DIAGNOSIS — I48.20 CHRONIC ATRIAL FIBRILLATION (H): Primary | ICD-10-CM

## 2020-11-02 DIAGNOSIS — Z51.81 ENCOUNTER FOR THERAPEUTIC DRUG MONITORING: ICD-10-CM

## 2020-11-02 NOTE — PROGRESS NOTES
"[place] [facility]  [place] Medical Record Number: [id number]  Place of Service where encounter took place: [facility] ([facility]) [[id number]]    HPI:    Sandeep Tapia is a 86 year old (10/27/1934), who is being seen today for an episodic care visit at the above location. HPI information obtained from: facility chart records, facility staff, patient report and [facility] chart review. Today's concern is INR/Coumadin management for A. Fib    ROS/Subjective:  Bleeding Signs/Symptoms: None  Thromboembolic Signs/Symptoms:  None  Medication Changes: No  Dietary Changes:  No  Activity Changes: No  Bacterial/Viral Infection: No  Missed Coumadin Doses: None  On ASA: No  Other Concerns: No    OBJECTIVE:  /67   Pulse 58   Temp 97.5  F (36.4  C)   Resp 18   Ht 1.727 m (5' 8\")   Wt 73.1 kg (161 lb 3.2 oz)   SpO2 96%   BMI 24.51 kg/m    Last INR: 1.9 on 10/19  INR Today:  4.0  Current Dose:  2.5mg W, 5mg ROW  INR Flow sheet at [facility]:    ASSESSMENT:     Chronic atrial fibrillation (H)  Encounter for therapeutic drug monitoring  Long term current use of anticoagulant therapy  Supratherapeutic INR for goal of 2-3    PLAN:   Orders written by provider at facility  New Dose: Hold M/T, 2.5mg Wed    Next INR: 11/5      Electronically signed by:  REBECCA Pisano CNP     "

## 2020-11-03 NOTE — PROGRESS NOTES
"[place] [facility]  [place] Medical Record Number: [id number]  Place of Service where encounter took place: [facility] ([facility] [[id number]]  Chief Complaint   Patient presents with     RECHECK       HPI:    Sandeep Tapia is a 86 year old (10/27/1934), who is being seen today for an episodic care visit. HPI information obtained from: facility chart records, facility staff, patient report and [facility] chart review. Today's concern is:     Hematoma of right ear, subsequent encounter  Laceration of right ear, subsequent encounter  Fall, subsequent encounter     Patient seen for follow up, had fall on 10/20 with sutured lacerations on R ear lobe plus moderate size hematoma in R ear lateral to canal, was started on cephalexin and wound cares, exam today sutures have been removed, no s/s infection, hematoma still present about 1.3x0.8cm protruding 0.5cm, appears lashae with intact skin, not blocking ear canal nor causing change in hearing.     Past Medical and Surgical History reviewed in Epic today.    MEDICATIONS:    Current Outpatient Medications   Medication Sig Dispense Refill     acetaminophen (TYLENOL) 500 MG tablet Take 2 tablets (1,000 mg) by mouth every 8 hours as needed for mild pain 60 tablet 11     atorvastatin (LIPITOR) 20 MG tablet Take 1 tablet (20 mg) by mouth daily 30 tablet 11     citalopram (CELEXA) 20 MG tablet Take 1 tablet (20 mg) by mouth daily 30 tablet 11     levothyroxine (SYNTHROID/LEVOTHROID) 50 MCG tablet Take 1 tablet (50 mcg) by mouth daily 30 tablet 11     tamsulosin (FLOMAX) 0.4 MG capsule Take 1 capsule (0.4 mg) by mouth daily 30 capsule 11     warfarin ANTICOAGULANT (COUMADIN) 1 MG tablet Take 1 tablet (1 mg) by mouth daily 30 tablet 11     REVIEW OF SYSTEMS:  4 point ROS including Respiratory, CV, GI and , other than that noted in the HPI,  is negative    Objective:  /67   Pulse 58   Temp 96.6  F (35.9  C)   Resp 18   Ht 1.727 m (5' 8\")   " Wt 73.1 kg (161 lb 3.2 oz)   SpO2 96%   BMI 24.51 kg/m    Exam:  GENERAL APPEARANCE:  in no distress, appears healthy  ENT:  Mouth and posterior oropharynx normal, moist mucous membranes  RESP:  lungs clear to auscultation   CV:  peripheral edema scant+ in lower legs  ABDOMEN:  bowel sounds normal  M/S:   Gait and station normal  SKIN:  Inspection of skin and subcutaneous tissueas in HPI  NEURO:   Examination of sensation by touch normal  PSYCH:  oriented X 3, memory impaired     Labs:   Labs done in SNF are in [place] EPIC. Please refer to them using [employer]/Care Everywhere.    ASSESSMENT/PLAN:  Hematoma of right ear, subsequent encounter  Laceration of right ear, subsequent encounter  Fall, subsequent encounter  -lacerations appear healing  -hematoma of concern due to proximity of ear canal and potential for skin splitting  -of note, patient is on warfarin  -left message with daughter Ellen RE: status and plan  -OTOLARYNGOLOGY REFERRAL        Electronically signed by:  REBECCA Pisano CNP

## 2020-11-04 ENCOUNTER — ASSISTED LIVING VISIT (OUTPATIENT)
Dept: GERIATRICS | Facility: CLINIC | Age: 85
End: 2020-11-04
Payer: COMMERCIAL

## 2020-11-04 VITALS
HEIGHT: 68 IN | BODY MASS INDEX: 24.43 KG/M2 | WEIGHT: 161.2 LBS | RESPIRATION RATE: 18 BRPM | HEART RATE: 58 BPM | DIASTOLIC BLOOD PRESSURE: 67 MMHG | OXYGEN SATURATION: 96 % | TEMPERATURE: 96.6 F | SYSTOLIC BLOOD PRESSURE: 125 MMHG

## 2020-11-04 DIAGNOSIS — S01.311D LACERATION OF RIGHT EAR, SUBSEQUENT ENCOUNTER: ICD-10-CM

## 2020-11-04 DIAGNOSIS — S00.431D HEMATOMA OF RIGHT EAR, SUBSEQUENT ENCOUNTER: Primary | ICD-10-CM
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
## 2020-11-11 ENCOUNTER — ASSISTED LIVING VISIT (OUTPATIENT)
Dept: GERIATRICS | Facility: CLINIC | Age: 85
End: 2020-11-11
Payer: COMMERCIAL

## 2020-11-11 VITALS
DIASTOLIC BLOOD PRESSURE: 67 MMHG | TEMPERATURE: 96.6 F | RESPIRATION RATE: 18 BRPM | SYSTOLIC BLOOD PRESSURE: 125 MMHG | HEIGHT: 68 IN | BODY MASS INDEX: 24.4 KG/M2 | OXYGEN SATURATION: 96 % | HEART RATE: 58 BPM | WEIGHT: 161 LBS

## 2020-11-11 DIAGNOSIS — Z51.81 ENCOUNTER FOR THERAPEUTIC DRUG MONITORING: ICD-10-CM

## 2020-11-11 DIAGNOSIS — Z79.01 LONG TERM CURRENT USE OF ANTICOAGULANT THERAPY: ICD-10-CM

## 2020-11-11 DIAGNOSIS — I48.20 CHRONIC ATRIAL FIBRILLATION (H): Primary | ICD-10-CM

## 2020-11-11 ASSESSMENT — MIFFLIN-ST. JEOR: SCORE: 1384.79

## 2020-11-11 NOTE — PROGRESS NOTES
"[place] [facility]  [place] Medical Record Number:  [id number]  Place of Service where encounter took place: [facility] ([facility]) [[id number]]    HPI:    Sandeep Tapia is a 86 year old  (10/27/1934), who is being seen today for an episodic care visit at the above location.   HPI information obtained from: facility chart records, facility staff, patient report and [facility] chart review. Today's concern is INR/Coumadin management for A. Fib    ROS/Subjective:  Bleeding Signs/Symptoms:  None  Thromboembolic Signs/Symptoms:  None  Medication Changes:  No  Dietary Changes:  No  Activity Changes: No  Bacterial/Viral Infection:  No  Missed Coumadin Doses:  None  On ASA: No  Other Concerns:  No    OBJECTIVE:  /67   Pulse 58   Temp 96.6  F (35.9  C)   Resp 18   Ht 1.727 m (5' 8\")   Wt 73 kg (161 lb)   SpO2 96%   BMI 24.48 kg/m    Last INR: 1.8 on 11/5  INR Today:  2.3  Current Dose:  2.5mg T/Th, 5mg ROW  INR Flow sheet at [facility]:    ASSESSMENT:     Chronic atrial fibrillation (H)  Encounter for therapeutic drug monitoring  Long term current use of anticoagulant therapy  Therapeutic INR for goal of 2-3    PLAN:   Orders written by provider at facility  New Dose: No Change    Next INR: 11/16      Electronically signed by:  REBECCA Pisano CNP       "

## 2020-11-11 NOTE — TELEPHONE ENCOUNTER
FUTURE VISIT INFORMATION      FUTURE VISIT INFORMATION:    Date: 1/22/2021    Time: 4 PM    Location: [facility]  REFERRAL INFORMATION:    Referring provider:  Dr. Kirby Fuentes    Referring providers clinic:  [facility]    Reason for visit/diagnosis: Hematoma of right ear    RECORDS REQUESTED FROM:       Clinic name Comments Records Status Imaging Status   [facility] 11/11/20, 11/4/20 - [facility] OV with Dr. Fuentes (Additional OVs in [facility]) [facility] 10/20/20 - ED OV with Dr. Lopez Epic                        * 11/11/20 10:01 AM Faxed req to PN for any records. - [place]  * 11/12/20 2:29 PM Received fax from [facility] stating no records pertaining to ears or heamtoma. - Berenice

## 2020-11-16 ENCOUNTER — ASSISTED LIVING VISIT (OUTPATIENT)
Dept: GERIATRICS | Facility: CLINIC | Age: 85
End: 2020-11-16
Payer: COMMERCIAL

## 2020-11-16 VITALS
SYSTOLIC BLOOD PRESSURE: 125 MMHG | TEMPERATURE: 97.5 F | OXYGEN SATURATION: 96 % | BODY MASS INDEX: 24.43 KG/M2 | RESPIRATION RATE: 18 BRPM | DIASTOLIC BLOOD PRESSURE: 67 MMHG | HEIGHT: 68 IN | WEIGHT: 161.2 LBS | HEART RATE: 58 BPM

## 2020-11-16 DIAGNOSIS — Z79.01 LONG TERM CURRENT USE OF ANTICOAGULANT THERAPY: ICD-10-CM

## 2020-11-16 DIAGNOSIS — Z51.81 ENCOUNTER FOR THERAPEUTIC DRUG MONITORING: ICD-10-CM

## 2020-11-16 DIAGNOSIS — I48.20 CHRONIC ATRIAL FIBRILLATION (H): Primary | ICD-10-CM

## 2020-11-16 NOTE — PROGRESS NOTES
"[place] [facility]  [place] Medical Record Number:  [id number]  Place of Service where encounter took place: [facility] ([facility]) [[id number]]    HPI:    Sandeep Tapia is a 86 year old  (10/27/1934), who is being seen today for an episodic care visit at the above location.   HPI information obtained from: facility chart records, facility staff, patient report and [facility] chart review. Today's concern is INR/Coumadin management for A. Fib    ROS/Subjective:  Bleeding Signs/Symptoms:  None  Thromboembolic Signs/Symptoms:  None  Medication Changes:  No  Dietary Changes:  No  Activity Changes: No  Bacterial/Viral Infection:  No  Missed Coumadin Doses:  None  On ASA: No  Other Concerns:  No    OBJECTIVE:  /67   Pulse 58   Temp 97.5  F (36.4  C)   Resp 18   Ht 1.727 m (5' 8\")   Wt 73.1 kg (161 lb 3.2 oz)   SpO2 96%   BMI 24.51 kg/m    Last INR: 2.3 on 11/9  INR Today:  3.4  Current Dose:  2.5mg T/Th, 5mg ROW  INR Flow sheet at [facility]:    ASSESSMENT:     Chronic atrial fibrillation (H)  Encounter for therapeutic drug monitoring  Long term current use of anticoagulant therapy  Supratherapeutic INR for goal of 2-3    PLAN:   Orders written by provider at facility  New Dose: Hold Mon, 2.5mg T, W, Th    Next INR: 11/20      Electronically signed by:  REBECCA Pisano CNP     "

## 2020-11-19 VITALS
BODY MASS INDEX: 24.43 KG/M2 | DIASTOLIC BLOOD PRESSURE: 67 MMHG | SYSTOLIC BLOOD PRESSURE: 125 MMHG | WEIGHT: 161.2 LBS | RESPIRATION RATE: 18 BRPM | OXYGEN SATURATION: 96 % | HEIGHT: 68 IN | HEART RATE: 58 BPM | TEMPERATURE: 97.1 F

## 2020-11-19 NOTE — PROGRESS NOTES
"[place] [facility]  [place] Medical Record Number: [id number]  Place of Service where encounter took place: [facility] ([facility]) [[id number]]    HPI:    Sandeep Tapia is a 86 year old (10/27/1934), who is being seen today for an episodic care visit at the above location. HPI information obtained from: facility chart records, facility staff, patient report and [facility] chart review. Today's concern is INR/Coumadin management for A. Fib    ROS/Subjective:  Bleeding Signs/Symptoms:  None  Thromboembolic Signs/Symptoms:  None  Medication Changes:  No  Dietary Changes:  No  Activity Changes: No  Bacterial/Viral Infection:  No  Missed Coumadin Doses:  None  On ASA: No  Other Concerns:  No    OBJECTIVE:  /67   Pulse 58   Temp 97.1  F (36.2  C)   Resp 18   Ht 1.727 m (5' 8\")   Wt 73.1 kg (161 lb 3.2 oz)   SpO2 96%   BMI 24.51 kg/m    Last INR: 3.4 on 11/16  INR Today:  1.8  Current Dose:  Hold x1, 2.5mg TWTh  INR Flow sheet at [facility]:    ASSESSMENT:     Chronic atrial fibrillation (H)  Long term current use of anticoagulant therapy  Encounter for therapeutic drug monitoring  Therapeutic INR for goal of 2-3    PLAN:   Orders written by provider at facility  New Dose: 2.5mg MWF, 5mg ROW    Next INR: 11/30      Electronically signed by:  REBECCA Pisano CNP     "

## 2020-11-20 ENCOUNTER — ASSISTED LIVING VISIT (OUTPATIENT)
Dept: GERIATRICS | Facility: CLINIC | Age: 85
End: 2020-11-20
Payer: COMMERCIAL

## 2020-11-20 DIAGNOSIS — Z79.01 LONG TERM CURRENT USE OF ANTICOAGULANT THERAPY: ICD-10-CM

## 2020-11-20 DIAGNOSIS — I48.20 CHRONIC ATRIAL FIBRILLATION (H): Primary | ICD-10-CM

## 2020-11-20 DIAGNOSIS — Z51.81 ENCOUNTER FOR THERAPEUTIC DRUG MONITORING: ICD-10-CM

## 2020-11-27 VITALS
BODY MASS INDEX: 24.43 KG/M2 | DIASTOLIC BLOOD PRESSURE: 67 MMHG | HEART RATE: 58 BPM | WEIGHT: 161.2 LBS | HEIGHT: 68 IN | SYSTOLIC BLOOD PRESSURE: 125 MMHG | RESPIRATION RATE: 18 BRPM | TEMPERATURE: 97.3 F | OXYGEN SATURATION: 96 %

## 2020-11-27 NOTE — PROGRESS NOTES
"[place] [facility]  [place] Medical Record Number: [id number]  Place of Service where encounter took place: [facility] ([facility]) [[id number]]    HPI:    Sandeep Tapia is a 86 year old (10/27/1934), who is being seen today for an episodic care visit at the above location. HPI information obtained from: facility chart records, facility staff, patient report and [facility] chart review. Today's concern is INR/Coumadin management for A. Fib    ROS/Subjective:  Bleeding Signs/Symptoms: None  Thromboembolic Signs/Symptoms: None  Medication Changes:  No  Dietary Changes:  No  Activity Changes: No  Bacterial/Viral Infection: No  Missed Coumadin Doses: None  On ASA: No  Other Concerns: No    OBJECTIVE:  /67   Pulse 58   Temp 97.3  F (36.3  C)   Resp 18   Ht 1.727 m (5' 8\")   Wt 73.1 kg (161 lb 3.2 oz)   SpO2 96%   BMI 24.51 kg/m    Last INR: 1.8 on 11/20  INR Today:  2.7  Current Dose:  2.5mg MWF, 5mg ROW  INR Flow sheet at [facility]:    ASSESSMENT:     Chronic atrial fibrillation (H)  Encounter for therapeutic drug monitoring  Long term current use of anticoagulant therapy  Therapeutic INR for goal of 2-3    PLAN:   Orders written by provider at facility  New Dose: 5mg MWF, 2.5mg ROW    Next INR: 12/7      Electronically signed by:  REBECCA Pisano CNP     "

## 2020-11-30 ENCOUNTER — ASSISTED LIVING VISIT (OUTPATIENT)
Dept: GERIATRICS | Facility: CLINIC | Age: 85
End: 2020-11-30
Payer: COMMERCIAL

## 2020-11-30 DIAGNOSIS — I48.20 CHRONIC ATRIAL FIBRILLATION (H): Primary | ICD-10-CM

## 2020-11-30 DIAGNOSIS — Z51.81 ENCOUNTER FOR THERAPEUTIC DRUG MONITORING: ICD-10-CM

## 2020-11-30 DIAGNOSIS — Z79.01 LONG TERM CURRENT USE OF ANTICOAGULANT THERAPY: ICD-10-CM

## 2020-12-01 ASSESSMENT — MIFFLIN-ST. JEOR: SCORE: 1371.18

## 2020-12-06 VITALS
RESPIRATION RATE: 18 BRPM | SYSTOLIC BLOOD PRESSURE: 148 MMHG | HEIGHT: 68 IN | BODY MASS INDEX: 23.95 KG/M2 | TEMPERATURE: 96.7 F | WEIGHT: 158 LBS | HEART RATE: 62 BPM | OXYGEN SATURATION: 97 % | DIASTOLIC BLOOD PRESSURE: 65 MMHG

## 2020-12-06 NOTE — PROGRESS NOTES
"[place] [facility]  [place] Medical Record Number: [id number]  Place of Service where encounter took place: [facility] ([facility]) [[id number]]    HPI:    Sandeep Tapia is a 86 year old (10/27/1934), who is being seen today for an episodic care visit at the above location. HPI information obtained from: facility chart records, facility staff, patient report and [facility] chart review. Today's concern is INR/Coumadin management for A. Fib    ROS/Subjective:  Bleeding Signs/Symptoms: None  Thromboembolic Signs/Symptoms: None  Medication Changes: No  Dietary Changes: No  Activity Changes: No  Bacterial/Viral Infection: No  Missed Coumadin Doses: None  On ASA: No  Other Concerns: No    OBJECTIVE:  BP (!) 148/65   Pulse 62   Temp 96.7  F (35.9  C)   Resp 18   Ht 1.727 m (5' 8\")   Wt 71.7 kg (158 lb)   SpO2 97%   BMI 24.02 kg/m    Last INR: 2.7 on 11/30  INR Today:  3.0  Current Dose:  5mg MWF, 2.5mg ROW  INR Flow sheet at [facility]:    ASSESSMENT:     Chronic atrial fibrillation (H)  Encounter for therapeutic drug monitoring  Long term current use of anticoagulant therapy  Therapeutic INR for goal of 2-3    PLAN:   Orders written by provider at facility  New Dose: 5mg W, 2.5mg ROW    Next INR: 12/14      Electronically signed by:  REBECCA Pisano CNP     " Physical Therapy Evaluation    Visit Count: 1   Plan of Care: 7/25/2019 Through: 9/19/2019  Insurance Information:   Therapy Benefits     Payor: [facility]  Authorization Needed: No  Maximum Visit Limit Per Year: 60 visits per calendar year combined, PT/OT/ST  CoPay: $0  Call Ref #: Online      Hyper link to: insurance WIKI     No quote of benefits is guaranteed  Referred by: Ashly Solomon DPM; Next provider visit (if known/scheduled):   Medical Diagnosis (from order): Status post foot surgery [Z98.890]  Hallux valgus of right foot [M20.11]  Treatment Diagnosis: bilateral foot  symptoms with increased pain/symptoms, impaired strength, impaired range of motion, increased swelling, impaired joint mobility/play, impaired gait, impaired activity tolerance    Date of Surgery: Left bunionectomy was  4/12/19.   5/31/2019 Surgery Performed: Bunionectomy With Osteotomy With Akin Right Foot - Right  Physician Guidelines/Precautions: none   Chart reviewed at time of initial evaluation (relevant co-morbidities, allergies, tests and medications listed): Ibuprofen prn     SUBJECTIVE   Description of Problem and/or Mechanism of Injury:  Progressive increase in bilateral foot pain eventually leading to bilateral bunionectomies this past April and May. States she is already able to walk longer with less discomfort compared to pre surgery  She uses Superfeet orthotic in her hiking shoes and sneaker    Pain:  Intensity (0-10 scale): Current: Left foot 0, right foot 0/10; Pain Range (best-worse): Left and right can get up to an 8/10 with a lot walking  Location: right and left first toe and met head region  Quality/Description: Ache, Sore, Stiff, Tight  Relieving/Alleviating factors: rest    Function:  Limitations and exacerbating factors (patient reported): pain, difficulty with walking more than a mile and standing longer periods  Prior level (patient reported): increasing pain and difficulty with function, therefore underwent surgery to  regain function, ambulating without assistive device  Patient Goals: Walk further and standing longer without apin     Prior Treatment: no therapies in the past year for current condition. Hospitalization, home health services or skilled nursing facility in the last 30 days: No, per patient.  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 70% of day spent in sitting  Hobbies/activiteis: Hiking, biking  Safety:  Do you feel safe at home, work and/or school? yes, per patient  Patient denies 2 or more falls or an unexplained fall with injury in the last year, further testing not required     OBJECTIVE   Posture/Observation:  Incisions healing well over both first toes  Bilateral pes cavus  Right first toe dorsiflexed 5 deg or so and does not contact the ground. Left side DF 3 deg with distal tip off the ground.     Gait Analysis:  Using no assistive device; Min left antalgic with early toe off       Range of Motion (degrees)   Left Right   Date Initial Initial   Hip Flexion (110-120) 110 110   Hip Extension (10-15) 10 10   Hip Internal Rotation (30-40) 30 30   Hip External Rotation (40-60) 45 45   Knee Flexion (135)     Knee Extension (0-5) 0 0   Ankle Dorsiflexion (20) 5 10   Ankle Plantar Flexion (50) 45 45   Ankle Inversion (35) 35 35   Ankle Eversion (15) 15 15   Reported in degrees, active range of motion recorded unless noted as AA=active assistive or P=passive; standard testing positions unless otherwise noted, norms included in ( ); *=pain       Right first toe PROM -5-70 deg DF  Left first toe PROM -3-70 deg DF    Strength: (out of 5)   Left Right   Date Initial Initial   Hip Flexors 4+ 4+   Hip Extensors 4 4   Hip Abductors 4 4   Hip Adductors     Hip Internal Rotators 4+ 4+   Hip External Rotators 4 4   Knee Flexors 5- 5-   Knee Extensors  5 5   Ankle Dorsiflexors 5 5   Ankle Plantar Flexors     Ankle Invertors 5- 5-   Ankle Evertors     standard testing positins unless otherwise noted; *=pain  Only muscle strength that was assessed are noted.    Palpation:  Mild tenderness and sensitivity over the incisions. Decrease STM with MFR bilateral gastrocs, medial solues    Tierra's sign: Negative bilaterally, indication for deep vein thrombosis     Outcome Measures:   Lower Extremity Functional Scale: LEFS Calculated Total: 59 (0=extreme difficulty; 80=no difficulty)     Initial Treatment   Initial evaluation completed.    Therapeutic Exercise: instruction in and completion of:  Exercises   Seated Toe Flexion Extension PROM - 3 reps - 30 second hold - 5x daily   Gastroc Stretch on Wall - 3 reps - 30 second hold - 3x daily   Seated Ankle Plantarflexion Dorsiflexion PROM - 3 reps - 30 second hold - 3x daily     Skilled input: verbal instruction/cues, posture correction    Home Program:  Access Code: [email or web address]   URL: [email or web address]   Date: 07/25/2019   Prepared by: Kyler Fernandez     Exercises   Seated Toe Flexion Extension PROM - 3 reps - 30 second hold - 5x daily   Gastroc Stretch on Wall - 3 reps - 30 second hold - 3x daily   Seated Ankle Plantarflexion Dorsiflexion PROM - 3 reps - 30 second hold - 3x daily     Writer verbally educated the patient and received verbal consent from the patient on hand placement, positioning of patient, and techniques to be performed today including hand placement and palpation for techniques as described above and how they are pertinent to the patient's plan of care.      Suggestions for next session as indicated: progress per plan of care, review home exercise program, MFR and STM to gastro, soleus, scar massage, mid foot mobes, recumbent or nustep, hip strengthening in bars, calf stretch, squats    ASSESSMENT   58 year old female patient has signs and symptoms of bilateral foot pain post op bilateral  bunionectomies this past April and May that has reported functional limitations listed above.  She is already tolerating long periods of walkign with less pain. She demos decreased STM at the incision, decreased first toe mobility, tightness at the calf muscles and mild weakness at the ankles an dhip    Patient will benefit from skilled therapy and rehab potential is good based on assessment above   Predicted patient presentation: Low (stable) - Patient comorbidities and complexities, as defined above, will have little effect on progress for prescribed plan of care.    PLAN   Goals: To be obtained by end of this plan of care:  1. Patient will be independent with progressed and modified home exercise program  2. Decrease pain/symptoms to 0/10  3. Improve involved strength to 5/5  4. Improve involved range of motion to Wfls  through improvements listed above patient will:  5. Be able to ambulate for 60 minutes without pain/difficulty  6. Be able to stand for 60 minutes without pain/difficulty to improve activity tolerance, age appropriate activities  7. Lower Extremity Functional Scale: Patient will complete form to reflect an improved score from initial score of 59 to greater than or equal to 65 to indicate patient reported improvement in function/disability/impairment (minimal detectable change: 9 points).     The following skilled interventions to be implemented to achieve above:  Gait Training (75079)  Manual Therapy (66609)  Neuromuscular Re-Education (36961)  Therapeutic Activity (27260)  Therapeutic Exercise (92143)  Heat/Cold (50683)    Frequency/Duration: 2 times per week for 6 weeks with tapering as the patient progresses for an estimated total of 12 visits    patient involved in and agreed to plan of care and goals.   Attendance policy provided at time of evaluation.      Patient Education:  Who will be receiving education: patient  Are they ready to learn: yes  Preferred learning style: written, verbal,  demonstration  Barriers to learning: no barriers apparent at this time   Result of initial outlined education: Verbalizes understanding and Demonstrates understanding    THERAPY DAILY BILLING   Insurance: [place] [employer]. N/A    Evaluation Procedures:  Physical Therapy Evaluation: Low Complexity    Timed Procedures:  Therapeutic Exercise, 8 minutes    Untimed Procedures:  No untimed codes were used on this date of service    Total Treatment Time: 45 minutes    Electronically sent for referring provider signature

## 2020-12-07 ENCOUNTER — ASSISTED LIVING VISIT (OUTPATIENT)
Dept: GERIATRICS | Facility: CLINIC | Age: 85
End: 2020-12-07
Payer: COMMERCIAL

## 2020-12-07 DIAGNOSIS — Z79.01 LONG TERM CURRENT USE OF ANTICOAGULANT THERAPY: ICD-10-CM

## 2020-12-07 DIAGNOSIS — I48.20 CHRONIC ATRIAL FIBRILLATION (H): Primary | ICD-10-CM

## 2020-12-07 DIAGNOSIS — Z51.81 ENCOUNTER FOR THERAPEUTIC DRUG MONITORING: ICD-10-CM

## 2020-12-08 VITALS
SYSTOLIC BLOOD PRESSURE: 148 MMHG | OXYGEN SATURATION: 97 % | HEART RATE: 62 BPM | WEIGHT: 158 LBS | RESPIRATION RATE: 18 BRPM | BODY MASS INDEX: 23.95 KG/M2 | HEIGHT: 68 IN | DIASTOLIC BLOOD PRESSURE: 65 MMHG | TEMPERATURE: 96.8 F

## 2020-12-08 ASSESSMENT — MIFFLIN-ST. JEOR: SCORE: 1371.18

## 2020-12-08 NOTE — PROGRESS NOTES
[place] [facility]  [place] Medical Record Number: [id number]  Place of Service where encounter took place: [facility] ([facility] [[id number]]  Chief Complaint   Patient presents with     RECHECK       HPI:    Sandeep Tapia is a 86 year old (10/27/1934), who is being seen today for an episodic care visit. HPI information obtained from: facility chart records, facility staff, patient report and [facility] chart review. 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     Past Medical and Surgical History reviewed in Epic today.    MEDICATIONS:    Current Outpatient Medications   Medication Sig Dispense Refill     acetaminophen (TYLENOL) 500 MG tablet Take 2 tablets (1,000 mg) by mouth every 8 hours as needed for mild pain 60 tablet 11     atorvastatin (LIPITOR) 20 MG tablet Take 1 tablet (20 mg) by mouth daily 30 tablet 11     citalopram (CELEXA) 20 MG tablet Take 1 tablet (20 mg) by mouth daily 30 tablet 11     levothyroxine (SYNTHROID/LEVOTHROID) 50 MCG tablet Take 1 tablet (50 mcg) by mouth daily 30 tablet 11     tamsulosin (FLOMAX) 0.4 MG capsule Take 1 capsule (0.4 mg) by mouth daily 30 capsule 11     warfarin ANTICOAGULANT (COUMADIN) 1 MG tablet Take 1 tablet (1 mg) by mouth daily 30 tablet 11     REVIEW OF SYSTEMS:  4 point ROS including Respiratory, CV, GI and , other than that noted in the HPI,  is negative    Objective:  BP (!) 148/65    "Pulse 62   Temp 96.8  F (36  C)   Resp 18   Ht 1.727 m (5' 8\")   Wt 71.7 kg (158 lb)   SpO2 97%   BMI 24.02 kg/m    Exam:  GENERAL APPEARANCE:  in no distress, appears healthy  ENT:  Mouth and posterior oropharynx normal, moist mucous membranes  RESP:  lungs clear to auscultation   CV:  no edema, rate-normal  ABDOMEN:  bowel sounds normal  M/S:   Gait and station abnormal slightly unsteady  SKIN:  Inspection of skin and subcutaneous tissue baseline  NEURO:   Examination of sensation by touch normal  PSYCH:  oriented X 3    Labs:   Labs done in SNF are in [place] EPIC. Please refer to them using [employer] Everywhere.    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

## 2020-12-09 ENCOUNTER — ASSISTED LIVING VISIT (OUTPATIENT)
Dept: GERIATRICS | Facility: CLINIC | Age: 85
End: 2020-12-09
Payer: COMMERCIAL

## 2020-12-09 DIAGNOSIS — T14.8XXA HEMATOMA OF SKIN: ICD-10-CM

## 2020-12-09 DIAGNOSIS — R29.6 RECURRENT FALLS: ICD-10-CM

## 2020-12-09 DIAGNOSIS — R41.89 COGNITIVE DEFICIT AS LATE EFFECT OF TRAUMATIC BRAIN INJURY (H): Primary | ICD-10-CM

## 2020-12-09 DIAGNOSIS — S06.9XAS COGNITIVE DEFICIT AS LATE EFFECT OF TRAUMATIC BRAIN INJURY (H): Primary | ICD-10-CM

## 2020-12-11 NOTE — PROGRESS NOTES
"[place] [facility]  [place] Medical Record Number: [id number]  Place of Service where encounter took place: [facility] ([facility]) [[id number]]    HPI:    Sandeep Tapia is a 86 year old (10/27/1934), who is being seen today for an episodic care visit at the above location. HPI information obtained from: facility chart records, facility staff, patient report and [facility] chart review. Today's concern is INR/Coumadin management for A. Fib    ROS/Subjective:  Bleeding Signs/Symptoms: None  Thromboembolic Signs/Symptoms: None  Medication Changes: No  Dietary Changes: No  Activity Changes: No  Bacterial/Viral Infection: No  Missed Coumadin Doses: None  On ASA: No  Other Concerns: No    OBJECTIVE:  BP (!) 148/65   Pulse 62   Temp 99.8  F (37.7  C)   Resp 18   Ht 1.727 m (5' 8\")   Wt 71.7 kg (158 lb)   SpO2 97%   BMI 24.02 kg/m    Last INR: 3.0 on 12/7  INR Today:  1.7  Current Dose:  5mg W, 2.5mg ROW  INR Flow sheet at [facility]:    ASSESSMENT:     Chronic atrial fibrillation (H)  Encounter for therapeutic drug monitoring  Long term current use of anticoagulant therapy  Therapeutic INR for goal of 2-3    PLAN:   Orders written by provider at facility  New Dose: 5mg T/Th, 2.5mg ROW    Next INR: 12/21      Electronically signed by:  REBECCA Pisano CNP     "

## 2020-12-14 ENCOUNTER — ASSISTED LIVING VISIT (OUTPATIENT)
Dept: GERIATRICS | Facility: CLINIC | Age: 85
End: 2020-12-14
Payer: COMMERCIAL

## 2020-12-14 VITALS
BODY MASS INDEX: 23.95 KG/M2 | RESPIRATION RATE: 18 BRPM | HEIGHT: 68 IN | DIASTOLIC BLOOD PRESSURE: 65 MMHG | HEART RATE: 62 BPM | SYSTOLIC BLOOD PRESSURE: 148 MMHG | TEMPERATURE: 99.8 F | OXYGEN SATURATION: 97 % | WEIGHT: 158 LBS

## 2020-12-14 DIAGNOSIS — Z51.81 ENCOUNTER FOR THERAPEUTIC DRUG MONITORING: ICD-10-CM

## 2020-12-14 DIAGNOSIS — Z79.01 LONG TERM CURRENT USE OF ANTICOAGULANT THERAPY: ICD-10-CM

## 2020-12-14 DIAGNOSIS — I48.20 CHRONIC ATRIAL FIBRILLATION (H): Primary | ICD-10-CM

## 2020-12-14 ASSESSMENT — MIFFLIN-ST. JEOR: SCORE: 1371.18

## 2020-12-18 DIAGNOSIS — N40.0 BENIGN PROSTATIC HYPERPLASIA WITHOUT LOWER URINARY TRACT SYMPTOMS: ICD-10-CM

## 2020-12-18 RX ORDER — TAMSULOSIN HYDROCHLORIDE 0.4 MG/1
CAPSULE ORAL
Qty: 30 CAPSULE | Refills: 11 | Status: SHIPPED | OUTPATIENT
Start: 2020-12-18 | End: 2022-01-01

## 2020-12-20 VITALS
HEART RATE: 62 BPM | OXYGEN SATURATION: 97 % | TEMPERATURE: 96.4 F | WEIGHT: 158 LBS | RESPIRATION RATE: 18 BRPM | HEIGHT: 68 IN | BODY MASS INDEX: 23.95 KG/M2 | DIASTOLIC BLOOD PRESSURE: 65 MMHG | SYSTOLIC BLOOD PRESSURE: 148 MMHG

## 2020-12-20 ASSESSMENT — MIFFLIN-ST. JEOR: SCORE: 1371.18

## 2020-12-20 NOTE — PROGRESS NOTES
"[place] [facility]  [place] Medical Record Number: [id number]  Place of Service where encounter took place: [facility] ([facility]) [[id number]]    HPI:    Sandeep Tapia is a 86 year old (10/27/1934), who is being seen today for an episodic care visit at the above location. HPI information obtained from: facility chart records, facility staff, patient report and [facility] chart review. Today's concern is INR/Coumadin management for A. Fib    ROS/Subjective:  Bleeding Signs/Symptoms: None  Thromboembolic Signs/Symptoms: None  Medication Changes: No  Dietary Changes: No  Activity Changes: No  Bacterial/Viral Infection: No  Missed Coumadin Doses: None  On ASA: No  Other Concerns: No    OBJECTIVE:  BP (!) 148/65   Pulse 62   Temp 96.4  F (35.8  C)   Resp 18   Ht 1.727 m (5' 8\")   Wt 71.7 kg (158 lb)   SpO2 97%   BMI 24.02 kg/m    Last INR: 1.7 on 12/14  INR Today:  1.7  Current Dose:  5mg T/Th, 2.5mg ROW  INR Flow sheet at [facility]:    ASSESSMENT:     Chronic atrial fibrillation (H)  Encounter for therapeutic drug monitoring  Long term current use of anticoagulant therapy  Subtherapeutic INR for goal of 2-3    PLAN:   Orders written by provider at facility  New Dose: 5mg MWF, 2.5mg ROW    Next INR: 12/28      Electronically signed by:  REBECCA Pisano CNP     "

## 2020-12-21 ENCOUNTER — ASSISTED LIVING VISIT (OUTPATIENT)
Dept: GERIATRICS | Facility: CLINIC | Age: 85
End: 2020-12-21
Payer: COMMERCIAL

## 2020-12-21 DIAGNOSIS — Z79.01 LONG TERM CURRENT USE OF ANTICOAGULANT THERAPY: ICD-10-CM

## 2020-12-21 DIAGNOSIS — Z51.81 ENCOUNTER FOR THERAPEUTIC DRUG MONITORING: ICD-10-CM

## 2020-12-21 DIAGNOSIS — I48.20 CHRONIC ATRIAL FIBRILLATION (H): Primary | ICD-10-CM

## 2020-12-27 NOTE — PROGRESS NOTES
"[place] [facility]  [place] Medical Record Number: [id number]  Place of Service where encounter took place: [facility] ([facility]) [[id number]]  Chief Complaint   Patient presents with     RECHECK       HPI:    Sandeep Tapia is a 86 year old (10/27/1934), who is being seen today for an episodic care visit. HPI information obtained from: facility chart records, facility staff, patient report and [facility] chart review. Today's concern is:     Chronic atrial fibrillation (H)  Long term current use of anticoagulant therapy  Encounter for therapeutic drug monitoring     Patient no s/s bruising nor bleeding, not on ASA, following med regimen with administration, no distress.    Past Medical and Surgical History reviewed in Epic today.    MEDICATIONS:    Current Outpatient Medications   Medication Sig Dispense Refill     acetaminophen (TYLENOL) 500 MG tablet Take 2 tablets (1,000 mg) by mouth every 8 hours as needed for mild pain 60 tablet 11     atorvastatin (LIPITOR) 20 MG tablet Take 1 tablet (20 mg) by mouth daily 30 tablet 11     citalopram (CELEXA) 20 MG tablet Take 1 tablet (20 mg) by mouth daily 30 tablet 11     levothyroxine (SYNTHROID/LEVOTHROID) 50 MCG tablet Take 1 tablet (50 mcg) by mouth daily 30 tablet 11     tamsulosin (FLOMAX) 0.4 MG capsule TAKE 1 CAP BY MOUTH ONCE DAILY FOR BPH 30 capsule 11     warfarin ANTICOAGULANT (COUMADIN) 1 MG tablet Take 1 tablet (1 mg) by mouth daily 30 tablet 11     REVIEW OF SYSTEMS:  4 point ROS including Respiratory, CV, GI and , other than that noted in the HPI,  is negative    Objective:  BP (!) 148/65   Pulse 62   Temp 96.2  F (35.7  C)   Resp 18   Ht 1.727 m (5' 8\")   Wt 71.7 kg (158 lb)   SpO2 97%   BMI 24.02 kg/m    Exam:  GENERAL APPEARANCE:  in no distress, appears healthy  ENT:  normal hearing acuity  RESP:  no respiratory distress  ABDOMEN:  no distension  M/S:   Gait and station abnormal supposed to use a cane  SKIN:  " Inspection of skin and subcutaneous tissue baseline  NEURO:   Examination of sensation by touch normal  PSYCH:  oriented X 3, memory impaired     Labs:   Labs done in SNF are in [place] EPIC. Please refer to them using [employer]/Care Everywhere.    ASSESSMENT/PLAN:  Chronic atrial fibrillation (H)  Long term current use of anticoagulant therapy  Encounter for therapeutic drug monitoring  -INR's have been intermittently elevated  -INR on 12/21 was 1.7  -warfarin increased to 5mg MWF, 2.5mg ROW  -INR today 2.1  -warfarin same dose  -recheck INR on 1/4/21        Electronically signed by:  REBECCA Pisano CNP

## 2020-12-28 ENCOUNTER — ASSISTED LIVING VISIT (OUTPATIENT)
Dept: GERIATRICS | Facility: CLINIC | Age: 85
End: 2020-12-28
Payer: COMMERCIAL

## 2020-12-28 VITALS
HEIGHT: 68 IN | RESPIRATION RATE: 18 BRPM | OXYGEN SATURATION: 97 % | TEMPERATURE: 96.2 F | SYSTOLIC BLOOD PRESSURE: 148 MMHG | WEIGHT: 158 LBS | BODY MASS INDEX: 23.95 KG/M2 | DIASTOLIC BLOOD PRESSURE: 65 MMHG | HEART RATE: 62 BPM

## 2020-12-28 DIAGNOSIS — Z79.01 LONG TERM CURRENT USE OF ANTICOAGULANT THERAPY: ICD-10-CM

## 2020-12-28 DIAGNOSIS — I48.20 CHRONIC ATRIAL FIBRILLATION (H): Primary | ICD-10-CM

## 2020-12-28 DIAGNOSIS — Z51.81 ENCOUNTER FOR THERAPEUTIC DRUG MONITORING: ICD-10-CM

## 2020-12-28 ASSESSMENT — MIFFLIN-ST. JEOR: SCORE: 1371.18

## 2021-01-03 VITALS
HEIGHT: 68 IN | SYSTOLIC BLOOD PRESSURE: 135 MMHG | TEMPERATURE: 97.5 F | WEIGHT: 155.3 LBS | BODY MASS INDEX: 23.54 KG/M2 | DIASTOLIC BLOOD PRESSURE: 65 MMHG | HEART RATE: 62 BPM | OXYGEN SATURATION: 96 % | RESPIRATION RATE: 18 BRPM

## 2021-01-03 ASSESSMENT — MIFFLIN-ST. JEOR: SCORE: 1358.94

## 2021-01-03 NOTE — PROGRESS NOTES
"[place] [facility]  [place] Medical Record Number: [id number]  Place of Service where encounter took place: [facility] ([facility]) [[id number]]    HPI:    Sandeep Tapia is a 86 year old (10/27/1934), who is being seen today for an episodic care visit at the above location. HPI information obtained from: facility chart records, facility staff, patient report and [facility] chart review. Today's concern is INR/Coumadin management for A. Fib    ROS/Subjective:  Bleeding Signs/Symptoms: None  Thromboembolic Signs/Symptoms: None  Medication Changes: No  Dietary Changes: No  Activity Changes: No  Bacterial/Viral Infection: No  Missed Coumadin Doses: None  On ASA: No  Other Concerns: No    OBJECTIVE:  /65   Pulse 62   Temp 97.5  F (36.4  C)   Resp 18   Ht 1.727 m (5' 8\")   Wt 70.4 kg (155 lb 4.8 oz)   SpO2 96%   BMI 23.61 kg/m    Last INR: 2.1 on 12/28  INR Today:  2.1  Current Dose:  5mg MWF, 2.5mg ROW  INR Flow sheet at [facility]:    ASSESSMENT:     Chronic atrial fibrillation (H)  Encounter for therapeutic drug monitoring  Long term current use of anticoagulant therapy  Therapeutic INR for goal of 2-3    PLAN:   Orders written by provider at facility  New Dose: No Change    Next INR: 1/11      Electronically signed by:  REBECCA Pisano CNP     "

## 2021-01-04 ENCOUNTER — ASSISTED LIVING VISIT (OUTPATIENT)
Dept: GERIATRICS | Facility: CLINIC | Age: 86
End: 2021-01-04
Payer: COMMERCIAL

## 2021-01-04 DIAGNOSIS — Z79.01 LONG TERM CURRENT USE OF ANTICOAGULANT THERAPY: ICD-10-CM

## 2021-01-04 DIAGNOSIS — I48.20 CHRONIC ATRIAL FIBRILLATION (H): Primary | ICD-10-CM

## 2021-01-04 DIAGNOSIS — Z51.81 ENCOUNTER FOR THERAPEUTIC DRUG MONITORING: ICD-10-CM

## 2021-01-10 VITALS
SYSTOLIC BLOOD PRESSURE: 135 MMHG | BODY MASS INDEX: 23.54 KG/M2 | HEART RATE: 62 BPM | WEIGHT: 155.3 LBS | OXYGEN SATURATION: 96 % | HEIGHT: 68 IN | DIASTOLIC BLOOD PRESSURE: 65 MMHG | TEMPERATURE: 97.3 F | RESPIRATION RATE: 18 BRPM

## 2021-01-10 ASSESSMENT — MIFFLIN-ST. JEOR: SCORE: 1358.94

## 2021-01-10 NOTE — PROGRESS NOTES
"[place] [facility]  [place] Medical Record Number: [id number]  Place of Service where encounter took place: [facility] ([facility]) [[id number]]    HPI:    Sandeep Tapia is a 86 year old (10/27/1934), who is being seen today for an episodic care visit at the above location. HPI information obtained from: facility chart records, facility staff, patient report and [facility] chart review. Today's concern is INR/Coumadin management for A. Fib    ROS/Subjective:  Bleeding Signs/Symptoms: None  Thromboembolic Signs/Symptoms: None  Medication Changes: No  Dietary Changes: No  Activity Changes: No  Bacterial/Viral Infection: No  Missed Coumadin Doses: None  On ASA: No  Other Concerns: No    OBJECTIVE:  /65   Pulse 62   Temp 97.3  F (36.3  C)   Resp 18   Ht 1.727 m (5' 8\")   Wt 70.4 kg (155 lb 4.8 oz)   SpO2 96%   BMI 23.61 kg/m    Last INR: 2.1 on 1.4  INR Today:  2.1  Current Dose:  5mg MWF, 2.5mg ROW  INR Flow sheet at [facility]:    ASSESSMENT:     Chronic atrial fibrillation (H)  Encounter for therapeutic drug monitoring  Long term current use of anticoagulant therapy  Therapeutic INR for goal of 2-3    PLAN:   Orders written by provider at facility  New Dose: No Change    Next INR: 1/25      Electronically signed by:  REBECCA Pisano CNP     "

## 2021-01-11 ENCOUNTER — ASSISTED LIVING VISIT (OUTPATIENT)
Dept: GERIATRICS | Facility: CLINIC | Age: 86
End: 2021-01-11
Payer: COMMERCIAL

## 2021-01-11 DIAGNOSIS — I48.20 CHRONIC ATRIAL FIBRILLATION (H): Primary | ICD-10-CM

## 2021-01-11 DIAGNOSIS — Z79.01 LONG TERM CURRENT USE OF ANTICOAGULANT THERAPY: ICD-10-CM

## 2021-01-11 DIAGNOSIS — Z51.81 ENCOUNTER FOR THERAPEUTIC DRUG MONITORING: ICD-10-CM

## 2021-01-22 ENCOUNTER — PRE VISIT (OUTPATIENT)
Dept: OTOLARYNGOLOGY | Facility: CLINIC | Age: 86
End: 2021-01-22

## 2021-01-24 VITALS
BODY MASS INDEX: 23.54 KG/M2 | SYSTOLIC BLOOD PRESSURE: 135 MMHG | TEMPERATURE: 98 F | DIASTOLIC BLOOD PRESSURE: 65 MMHG | HEIGHT: 68 IN | HEART RATE: 62 BPM | WEIGHT: 155.3 LBS | OXYGEN SATURATION: 96 % | RESPIRATION RATE: 18 BRPM

## 2021-01-24 ASSESSMENT — MIFFLIN-ST. JEOR: SCORE: 1358.94

## 2021-01-24 NOTE — PROGRESS NOTES
[place] [facility]  [place] Medical Record Number: [id number]  Place of Service where encounter took place: [facility] [[id number]]  Chief Complaint   Patient presents with     RECHECK       HPI:    Sandeep Tapia is a 86 year old (10/27/1934), who is being seen today for an episodic care visit. HPI information obtained from: facility chart records, facility staff, patient report and [facility] chart review. 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    Past Medical and Surgical History reviewed in Epic today.    MEDICATIONS:    Current Outpatient Medications   Medication Sig Dispense Refill     acetaminophen (TYLENOL) 500 MG tablet Take 2 tablets (1,000 mg) by mouth every 8 hours as needed for mild pain 60 tablet 11     atorvastatin (LIPITOR) 20 MG tablet Take 1 tablet (20 mg) by mouth daily 30 tablet 11     citalopram (CELEXA) 20 MG tablet Take 1 tablet (20 mg) by mouth daily 30 tablet 11     levothyroxine (SYNTHROID/LEVOTHROID) 50 MCG tablet Take 1 tablet (50 mcg) by mouth daily 30 tablet 11     tamsulosin (FLOMAX) 0.4 MG capsule TAKE 1 CAP BY MOUTH ONCE DAILY FOR BPH 30 capsule 11     warfarin ANTICOAGULANT (COUMADIN) 1 MG tablet Take 1 tablet (1 mg) by mouth daily 30 tablet 11     REVIEW OF SYSTEMS:  4 point ROS including Respiratory, CV, GI and , other than that noted in the HPI,  is  "negative    Objective:  /65   Pulse 62   Temp 98  F (36.7  C)   Resp 18   Ht 1.727 m (5' 8\")   Wt 70.4 kg (155 lb 4.8 oz)   SpO2 96%   BMI 23.61 kg/m    Exam:  GENERAL APPEARANCE:  in no distress, appears healthy  ENT:  Mouth and posterior oropharynx normal, moist mucous membranes  RESP:  lungs clear to auscultation   CV:  no edema, rate-normal  ABDOMEN:  bowel sounds normal  M/S:   Gait and station normal  SKIN:  Inspection of skin and subcutaneous tissue baseline  NEURO:   Examination of sensation by touch normal  PSYCH:  oriented X 3, memory impaired     Labs:   Labs done in SNF are in [place] EPIC. Please refer to them using [facility] Everywhere.    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

## 2021-01-25 ENCOUNTER — ASSISTED LIVING VISIT (OUTPATIENT)
Dept: GERIATRICS | Facility: CLINIC | Age: 86
End: 2021-01-25
Payer: COMMERCIAL

## 2021-01-25 DIAGNOSIS — Z51.81 ENCOUNTER FOR THERAPEUTIC DRUG MONITORING: ICD-10-CM

## 2021-01-25 DIAGNOSIS — S06.9XAS COGNITIVE DEFICIT AS LATE EFFECT OF TRAUMATIC BRAIN INJURY (H): Primary | ICD-10-CM

## 2021-01-25 DIAGNOSIS — Z79.01 LONG TERM CURRENT USE OF ANTICOAGULANT THERAPY: ICD-10-CM

## 2021-01-25 DIAGNOSIS — R41.89 COGNITIVE DEFICIT AS LATE EFFECT OF TRAUMATIC BRAIN INJURY (H): Primary | ICD-10-CM

## 2021-01-25 DIAGNOSIS — I48.20 CHRONIC ATRIAL FIBRILLATION (H): ICD-10-CM

## 2021-02-07 VITALS
TEMPERATURE: 96 F | HEART RATE: 64 BPM | RESPIRATION RATE: 18 BRPM | OXYGEN SATURATION: 96 % | BODY MASS INDEX: 23.28 KG/M2 | WEIGHT: 153.6 LBS | HEIGHT: 68 IN | SYSTOLIC BLOOD PRESSURE: 130 MMHG | DIASTOLIC BLOOD PRESSURE: 60 MMHG

## 2021-02-07 ASSESSMENT — MIFFLIN-ST. JEOR: SCORE: 1351.23

## 2021-02-08 ENCOUNTER — ASSISTED LIVING VISIT (OUTPATIENT)
Dept: GERIATRICS | Facility: CLINIC | Age: 86
End: 2021-02-08
Payer: COMMERCIAL

## 2021-02-08 DIAGNOSIS — Z51.81 ENCOUNTER FOR THERAPEUTIC DRUG MONITORING: ICD-10-CM

## 2021-02-08 DIAGNOSIS — E03.9 HYPOTHYROIDISM, UNSPECIFIED TYPE: ICD-10-CM

## 2021-02-08 DIAGNOSIS — I48.20 CHRONIC ATRIAL FIBRILLATION (H): ICD-10-CM

## 2021-02-08 DIAGNOSIS — F33.0 MILD EPISODE OF RECURRENT MAJOR DEPRESSIVE DISORDER (H): Primary | ICD-10-CM

## 2021-02-08 DIAGNOSIS — N18.30 STAGE 3 CHRONIC KIDNEY DISEASE, UNSPECIFIED WHETHER STAGE 3A OR 3B CKD (H): ICD-10-CM

## 2021-02-08 PROCEDURE — G0438 PPPS, INITIAL VISIT: HCPCS | Performed by: NURSE PRACTITIONER

## 2021-02-08 NOTE — PROGRESS NOTES
"[place] [facility]  [place] [facility] Record Number: [id number]  Place of Service where encounter took place: [facility] [[id number]]  Chief Complaint   Patient presents with     RECHECK     Wellness Visit       HPI:    Sandeep Tapia is a 86 year old (10/27/1934), who is being seen today for an episodic care visit. HPI information obtained from: facility chart records, facility staff, patient report and [facility] chart review. Today's concern is:     Mild episode of recurrent major depressive disorder (H)  Stage 3 chronic kidney disease, unspecified whether stage 3a or 3b CKD  Hypothyroidism, unspecified type  Chronic atrial fibrillation (H)  Encounter for therapeutic drug monitoring     Patient seen today with spouse present, recent SLUMs 23/30, no s/s depression, labs reviewed and recent GFR 39, TSH 0.79, chronic warfarin user, INR pending today for warfarin dosing, of note plan is for him to keep apartment on AL side and visit spouse on memory care as he wishes.     Annual Wellness Visit    Are you in the first 12 months of your Medicare Part B coverage?  No    Physical Health:    In general, how would you rate your overall physical health? excellent    Outside of work, how many days during the week do you exercise?none    Outside of work, approximately how many minutes a day do you exercise?not applicable    If you drink alcohol do you typically have >3 drinks per day or >7 drinks per week? No, non-alcoholic wine    Do you usually eat at least 4 servings of fruit and vegetables a day, include whole grains & fiber and avoid regularly eating high fat or \"junk\" foods? Yes    Do you have any problems taking medications regularly? No    Do you have any side effects from medications? none    Needs assistance for the following daily activities: transportation, shopping, preparing meals, housework, laundry and money management    Which of the following safety concerns are present " in your home?  none identified     Hearing impairment: No    In the past 6 months, have you been bothered by leaking of urine? no    Mental Health:    In general, how would you rate your overall mental or emotional health? excellent      PHQ-2 Score:       PHQ-2 ( 1999 [employer]) 2/8/2021   Q1: Little interest or pleasure in doing things 0   Q2: Feeling down, depressed or hopeless 0   PHQ-2 Score 0          Do you feel safe in your environment? Yes    Have you ever done Advance Care Planning? (For example, a Health Directive, POLST, or a discussion with a medical provider or your loved ones about your wishes)? Yes, advance care planning is on file.    Fall risk:  Fallen 2 or more times in the past year?: Yes  Any fall with injury in the past year?: Yes(Had hematoma ear canal, self resolved)      Cognitive Screening: Not appropriate due to mental handicap    Do you have sleep apnea, excessive snoring or daytime drowsiness?: no    Current providers sharing in care for this patient include:   Patient Care Team:  Kirby Fuentes APRN CNP as PCP - General (Nurse Practitioner)  Kirby Fuentes APRN CNP as Assigned PCP  Brian Ruiz MD as MD (Internal Medicine - Geriatric Medicine)  Olga Kathleen as Nursing Assistant (Gerontology)    REBECCA Pisano CNP    Past Medical and Surgical History reviewed in Epic today.    MEDICATIONS:    Current Outpatient Medications   Medication Sig Dispense Refill     acetaminophen (TYLENOL) 500 MG tablet Take 2 tablets (1,000 mg) by mouth every 8 hours as needed for mild pain 60 tablet 11     atorvastatin (LIPITOR) 20 MG tablet Take 1 tablet (20 mg) by mouth daily 30 tablet 11     citalopram (CELEXA) 20 MG tablet Take 1 tablet (20 mg) by mouth daily 30 tablet 11     levothyroxine (SYNTHROID/LEVOTHROID) 50 MCG tablet Take 1 tablet (50 mcg) by mouth daily 30 tablet 11     tamsulosin (FLOMAX) 0.4 MG capsule TAKE 1 CAP BY MOUTH ONCE DAILY FOR BPH 30  "capsule 11     warfarin ANTICOAGULANT (COUMADIN) 1 MG tablet Take 1 tablet (1 mg) by mouth daily 30 tablet 11     REVIEW OF SYSTEMS:  4 point ROS including Respiratory, CV, GI and , other than that noted in the HPI,  is negative    Objective:  /60   Pulse 64   Temp 96  F (35.6  C)   Resp 18   Ht 1.727 m (5' 8\")   Wt 69.7 kg (153 lb 9.6 oz)   SpO2 96%   BMI 23.35 kg/m    Exam:  GENERAL APPEARANCE:  in no distress, appears healthy  ENT:  Mouth and posterior oropharynx normal, moist mucous membranes  RESP:  lungs clear to auscultation   CV:  no edema, rate-normal  ABDOMEN:  bowel sounds normal  M/S:   Gait and station normal  SKIN:  Inspection of skin and subcutaneous tissue baseline  NEURO:   Examination of sensation by touch normal  PSYCH:  oriented X 3    Labs:   Labs done in SNF are in [place] EPIC. Please refer to them using [employer] [facility]/Care Everywhere.    ASSESSMENT/PLAN:  Mild episode of recurrent major depressive disorder (H)  -no overt s/s depression  -consider testing with GDS if symptomatic  -continue celexa 10mg    Stage 3 chronic kidney disease, unspecified whether stage 3a or 3b CKD  -labs 9/21 creat 1.58, GFR 39  -dose medications renally as possible  -follow up BMP in 3-6 months    Hypothyroidism, unspecified type  -TSH on 9/21 was 0.79  -continue levothyroxine 50mcg  -recheck TSH in 3-6 months    Chronic atrial fibrillation (H)  Encounter for therapeutic drug monitoring  -INR on 1/25 was 1.8, warfarin 5mg MWF, 2.5mg ROW  -INR today 2.1  -warfarin 5mg MWF, 2.5mg ROW  -recheck INR on 2/15        Electronically signed by:  REBECCA Pisano CNP         "

## 2021-02-14 VITALS
OXYGEN SATURATION: 96 % | RESPIRATION RATE: 18 BRPM | SYSTOLIC BLOOD PRESSURE: 130 MMHG | TEMPERATURE: 98.1 F | BODY MASS INDEX: 23.28 KG/M2 | DIASTOLIC BLOOD PRESSURE: 60 MMHG | HEIGHT: 68 IN | WEIGHT: 153.6 LBS | HEART RATE: 64 BPM

## 2021-02-14 ASSESSMENT — MIFFLIN-ST. JEOR: SCORE: 1351.23

## 2021-02-14 NOTE — PROGRESS NOTES
"[place] [facility]  [place] Medical Record Number: [id number]  Place of Service where encounter took place: [facility] [[id number]]  Chief Complaint   Patient presents with     RECHECK       HPI:    Sandeep Tapia is a 86 year old (10/27/1934), who is being seen today for an episodic care visit. HPI information obtained from: facility chart records, facility staff, patient report and [facility] chart review. 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    Past Medical and Surgical History reviewed in Epic today.    MEDICATIONS:    Current Outpatient Medications   Medication Sig Dispense Refill     acetaminophen (TYLENOL) 500 MG tablet Take 2 tablets (1,000 mg) by mouth every 8 hours as needed for mild pain 60 tablet 11     atorvastatin (LIPITOR) 20 MG tablet Take 1 tablet (20 mg) by mouth daily 30 tablet 11     citalopram (CELEXA) 20 MG tablet Take 1 tablet (20 mg) by mouth daily 30 tablet 11     levothyroxine (SYNTHROID/LEVOTHROID) 50 MCG tablet Take 1 tablet (50 mcg) by mouth daily 30 tablet 11     tamsulosin (FLOMAX) 0.4 MG capsule TAKE 1 CAP BY MOUTH ONCE DAILY FOR BPH 30 capsule 11     warfarin ANTICOAGULANT (COUMADIN) 1 MG tablet Take 1 tablet (1 mg) by mouth daily 30 tablet 11     REVIEW OF SYSTEMS:  4 point ROS including Respiratory, CV, GI and , other than that noted in the HPI,  is negative    Objective:  /60   Pulse 64   Temp 98.1  F (36.7  C)   Resp 18   Ht 1.727 m (5' 8\")   Wt 69.7 kg (153 lb 9.6 oz)   SpO2 96%   " BMI 23.35 kg/m    Exam:  GENERAL APPEARANCE:  in no distress, appears healthy  ENT:  Mouth and posterior oropharynx normal, moist mucous membranes  RESP:  lungs clear to auscultation , no respiratory distress  CV:  regular rate and rhythm, no murmur, rub, or gallop, no edema  ABDOMEN:  bowel sounds normal  M/S:   Gait and station abnormal uses cane/walker  SKIN:  Inspection of skin and subcutaneous tissue baseline  NEURO:   Examination of sensation by touch normal  PSYCH:  oriented X 3, affect and mood normal    Labs:   Labs done in SNF are in [place] EPIC. Please refer to them using [facility]/Care Everywhere.    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

## 2021-02-15 ENCOUNTER — ASSISTED LIVING VISIT (OUTPATIENT)
Dept: GERIATRICS | Facility: CLINIC | Age: 86
End: 2021-02-15
Payer: COMMERCIAL

## 2021-02-15 DIAGNOSIS — R41.89 COGNITIVE DEFICIT AS LATE EFFECT OF TRAUMATIC BRAIN INJURY (H): Primary | ICD-10-CM

## 2021-02-15 DIAGNOSIS — Z86.73 HISTORY OF CVA (CEREBROVASCULAR ACCIDENT): ICD-10-CM

## 2021-02-15 DIAGNOSIS — N40.0 BENIGN PROSTATIC HYPERPLASIA WITHOUT LOWER URINARY TRACT SYMPTOMS: ICD-10-CM

## 2021-02-15 DIAGNOSIS — S06.9XAS COGNITIVE DEFICIT AS LATE EFFECT OF TRAUMATIC BRAIN INJURY (H): Primary | ICD-10-CM

## 2021-03-01 ENCOUNTER — ASSISTED LIVING VISIT (OUTPATIENT)
Dept: GERIATRICS | Facility: CLINIC | Age: 86
End: 2021-03-01
Payer: COMMERCIAL

## 2021-03-01 VITALS
HEIGHT: 68 IN | OXYGEN SATURATION: 96 % | BODY MASS INDEX: 23.28 KG/M2 | DIASTOLIC BLOOD PRESSURE: 60 MMHG | HEART RATE: 64 BPM | RESPIRATION RATE: 18 BRPM | WEIGHT: 153.6 LBS | TEMPERATURE: 97.2 F | SYSTOLIC BLOOD PRESSURE: 130 MMHG

## 2021-03-01 DIAGNOSIS — I48.20 CHRONIC ATRIAL FIBRILLATION (H): Primary | ICD-10-CM

## 2021-03-01 DIAGNOSIS — Z51.81 ENCOUNTER FOR THERAPEUTIC DRUG MONITORING: ICD-10-CM

## 2021-03-01 DIAGNOSIS — Z79.01 LONG TERM CURRENT USE OF ANTICOAGULANT THERAPY: ICD-10-CM

## 2021-03-01 ASSESSMENT — MIFFLIN-ST. JEOR: SCORE: 1351.23

## 2021-03-01 NOTE — PROGRESS NOTES
"[place] [facility]  [place] Medical Record Number:  [id number]  Place of Service where encounter took place: [facility] ([facility] [[id number]]    HPI:    Sandeep Tapia is a 86 year old  (10/27/1934), who is being seen today for an episodic care visit at the above location.   HPI information obtained from: facility chart records, facility staff, patient report and [facility] chart review. Today's concern is INR/Coumadin management for A. Fib    ROS/Subjective:  Bleeding Signs/Symptoms:  None  Thromboembolic Signs/Symptoms:  None  Medication Changes:  No  Dietary Changes:  No  Activity Changes: No  Bacterial/Viral Infection:  No  Missed Coumadin Doses:  None  On ASA: No  Other Concerns:  No    OBJECTIVE:  /60   Pulse 64   Temp 97.2  F (36.2  C)   Resp 18   Ht 1.727 m (5' 8\")   Wt 69.7 kg (153 lb 9.6 oz)   SpO2 96%   BMI 23.35 kg/m    Last INR: 2.4 on 2/15  INR Today:  2.6  Current Dose:  5mg MWF, 2.5mg ROW  INR Flow sheet at [facility]:    ASSESSMENT:     Chronic atrial fibrillation (H)  Encounter for therapeutic drug monitoring  Long term current use of anticoagulant therapy  Therapeutic INR for goal of 2-3    PLAN:   Orders written by provider at facility  New Dose: 5mg T/Th, 2.5mg ROW    Next INR: 3/15      Electronically signed by:  REBECCA Pisano CNP     "

## 2021-03-02 DIAGNOSIS — Z86.73 HISTORY OF CVA (CEREBROVASCULAR ACCIDENT): ICD-10-CM

## 2021-03-03 LAB — TSH SERPL-ACNC: 2.47 UIU/ML (ref 0.3–4.5)

## 2021-03-03 RX ORDER — ATORVASTATIN CALCIUM 20 MG/1
TABLET, FILM COATED ORAL
Qty: 30 TABLET | Refills: 11 | Status: SHIPPED | OUTPATIENT
Start: 2021-03-03 | End: 2021-08-18

## 2021-03-08 ENCOUNTER — TRANSFERRED RECORDS (OUTPATIENT)
Dept: HEALTH INFORMATION MANAGEMENT | Facility: CLINIC | Age: 86
End: 2021-03-08

## 2021-03-08 LAB
ANION GAP SERPL CALCULATED.3IONS-SCNC: 5 MMOL/L (ref 7–16)
BUN SERPL-MCNC: 19 MG/DL (ref 7–26)
CALCIUM SERPL-MCNC: 8.2 MG/DL (ref 8.4–10.4)
CHLORIDE SERPLBLD-SCNC: 111 MMOL/L (ref 98–109)
CO2 SERPL-SCNC: 25 MMOL/L (ref 20–29)
CREAT SERPL-MCNC: 1.48 MG/DL (ref 0.73–1.18)
GFR SERPL CREATININE-BSD FRML MDRD: 42 ML/MIN/1.73M2
GLUCOSE SERPL-MCNC: 89 MG/DL (ref 70–100)
HEMOGLOBIN: 9.8 G/DL (ref 13.5–17.5)
POTASSIUM SERPL-SCNC: 4.2 MMOL/L (ref 3.5–5.1)
SODIUM SERPL-SCNC: 141 MMOL/L (ref 136–145)

## 2021-03-09 VITALS
RESPIRATION RATE: 18 BRPM | DIASTOLIC BLOOD PRESSURE: 65 MMHG | BODY MASS INDEX: 23.11 KG/M2 | WEIGHT: 152 LBS | SYSTOLIC BLOOD PRESSURE: 125 MMHG | OXYGEN SATURATION: 97 % | TEMPERATURE: 97.9 F | HEART RATE: 60 BPM

## 2021-03-10 ENCOUNTER — ASSISTED LIVING VISIT (OUTPATIENT)
Dept: GERIATRICS | Facility: CLINIC | Age: 86
End: 2021-03-10
Payer: COMMERCIAL

## 2021-03-10 DIAGNOSIS — E03.9 HYPOTHYROIDISM, UNSPECIFIED TYPE: ICD-10-CM

## 2021-03-10 DIAGNOSIS — N18.30 STAGE 3 CHRONIC KIDNEY DISEASE, UNSPECIFIED WHETHER STAGE 3A OR 3B CKD (H): ICD-10-CM

## 2021-03-10 DIAGNOSIS — F33.0 MILD EPISODE OF RECURRENT MAJOR DEPRESSIVE DISORDER (H): Primary | ICD-10-CM

## 2021-03-10 NOTE — PROGRESS NOTES
[place] [facility]  [place] Medical Record Number:  [id number]  Place of Service where encounter took place:  [facility] [[id number]]  Chief Complaint   Patient presents with     RECHECK       HPI:    Sandeep Tapia  is a 86 year old (10/27/1934), who is being seen today for an episodic care visit.  HPI information obtained from: facility chart records, facility staff, patient report and [facility] chart review. Today's concern is:     Mild episode of recurrent major depressive disorder (H)  Stage 3 chronic kidney disease, unspecified whether stage 3a or 3b CKD  Hypothyroidism, unspecified type     Patient seen in AL apartment, very pleasant, misses his spouse who recently moved to memory care, still goes to supper with her and visits regularly, many questions answered regarding how to positively react to persons with dementia, labs on 3/8 GFR 42, TSH 2.47.    Past Medical and Surgical History reviewed in Epic today.    MEDICATIONS:    Current Outpatient Medications   Medication Sig Dispense Refill     acetaminophen (TYLENOL) 500 MG tablet Take 2 tablets (1,000 mg) by mouth every 8 hours as needed for mild pain 60 tablet 11     atorvastatin (LIPITOR) 20 MG tablet TAKE 1 TAB BY MOUTH IN THE EVENING FOR HDL 30 tablet 11     citalopram (CELEXA) 20 MG tablet Take 1 tablet (20 mg) by mouth daily 30 tablet 11     levothyroxine (SYNTHROID/LEVOTHROID) 50 MCG tablet Take 1 tablet (50 mcg) by mouth daily 30 tablet 11     tamsulosin (FLOMAX) 0.4 MG capsule TAKE 1 CAP BY MOUTH ONCE DAILY FOR BPH 30 capsule 11     warfarin ANTICOAGULANT (COUMADIN) 1 MG tablet Take 1 tablet (1 mg) by mouth daily 30 tablet 11       REVIEW OF SYSTEMS:  4 point ROS including Respiratory, CV, GI and , other than that noted in the HPI,  is negative    Objective:  /65   Pulse 60   Temp 97.9  F (36.6  C)   Resp 18   Wt 68.9 kg (152 lb)   SpO2 97%   BMI 23.11 kg/m    Exam:  GENERAL APPEARANCE:  in no  distress, appears healthy  ENT:  Mouth and posterior oropharynx normal, moist mucous membranes  RESP:  lungs clear to auscultation   CV:  regular rate and rhythm, no murmur, rub, or gallop, no edema  ABDOMEN:  bowel sounds normal  M/S:   Gait and station normal  SKIN:  Inspection of skin and subcutaneous tissue baseline  NEURO:   Examination of sensation by touch normal  PSYCH:  oriented X 3    Labs:   Labs done in SNF are in [place] EPIC. Please refer to them using [facility]/Care Everywhere.    ASSESSMENT/PLAN:  Mild episode of recurrent major depressive disorder (H)  -sad with no overt depression, accomplished ADL's  -encouraged interaction with spouse  -continue celexa 20mg  -consider testing GDS if having exacerbation    Stage 3 chronic kidney disease, unspecified whether stage 3a or 3b CKD  -labs 3/8 creat 1.48, GFR 42  -dose medications renally as possible  -follow up BMP in 3-6 months    Hypothyroidism, unspecified type  -TSH on 3.8 was 2.47  -continue levothyroxine 50mcg  -follow up TSH in 3-6 months        Electronically signed by:  REBECCA Pisano CNP

## 2021-03-15 ENCOUNTER — ASSISTED LIVING VISIT (OUTPATIENT)
Dept: GERIATRICS | Facility: CLINIC | Age: 86
End: 2021-03-15
Payer: COMMERCIAL

## 2021-03-15 VITALS
TEMPERATURE: 97.8 F | DIASTOLIC BLOOD PRESSURE: 65 MMHG | WEIGHT: 152 LBS | RESPIRATION RATE: 18 BRPM | SYSTOLIC BLOOD PRESSURE: 125 MMHG | BODY MASS INDEX: 23.11 KG/M2 | HEART RATE: 60 BPM | OXYGEN SATURATION: 97 %

## 2021-03-15 DIAGNOSIS — I48.20 CHRONIC ATRIAL FIBRILLATION (H): Primary | ICD-10-CM

## 2021-03-15 DIAGNOSIS — Z51.81 ENCOUNTER FOR THERAPEUTIC DRUG MONITORING: ICD-10-CM

## 2021-03-15 DIAGNOSIS — Z79.01 LONG TERM CURRENT USE OF ANTICOAGULANT THERAPY: ICD-10-CM

## 2021-03-15 NOTE — PROGRESS NOTES
[place] [facility]  [place] Medical Record Number:  [id number]  Place of Service where encounter took place: [facility] ([facility]) [[id number]]    HPI:    Sandeep Tapia is a 86 year old  (10/27/1934), who is being seen today for an episodic care visit at the above location.   HPI information obtained from: facility chart records, facility staff, patient report and [facility] chart review. Today's concern is INR/Coumadin management for A. Fib    ROS/Subjective:  Bleeding Signs/Symptoms:  None  Thromboembolic Signs/Symptoms:  None  Medication Changes:  No  Dietary Changes:  No  Activity Changes: No  Bacterial/Viral Infection:  No  Missed Coumadin Doses:  None  On ASA: No  Other Concerns:  No    OBJECTIVE:  /65   Pulse 60   Temp 97.8  F (36.6  C)   Resp 18   Wt 68.9 kg (152 lb)   SpO2 97%   BMI 23.11 kg/m    Last INR: 2.6 on 3/1  INR Today:  1.7  Current Dose:  5mg T/Th, 2.5mg ROW  INR Flow sheet at [facility]:    ASSESSMENT:     Chronic atrial fibrillation (H)  Encounter for therapeutic drug monitoring  Long term current use of anticoagulant therapy  Therapeutic INR for goal of 2-3    PLAN:   Orders written by provider at facility  New Dose: 5mg MWF, 2.5mg ROW    Next INR: 3/22      Electronically signed by:  REBECCA Pisano CNP

## 2021-03-21 VITALS
HEART RATE: 60 BPM | TEMPERATURE: 97 F | BODY MASS INDEX: 23.11 KG/M2 | SYSTOLIC BLOOD PRESSURE: 125 MMHG | DIASTOLIC BLOOD PRESSURE: 65 MMHG | WEIGHT: 152 LBS | OXYGEN SATURATION: 97 % | RESPIRATION RATE: 18 BRPM

## 2021-03-22 ENCOUNTER — ASSISTED LIVING VISIT (OUTPATIENT)
Dept: GERIATRICS | Facility: CLINIC | Age: 86
End: 2021-03-22
Payer: COMMERCIAL

## 2021-03-22 DIAGNOSIS — Z51.81 ENCOUNTER FOR THERAPEUTIC DRUG MONITORING: ICD-10-CM

## 2021-03-22 DIAGNOSIS — Z79.01 LONG TERM CURRENT USE OF ANTICOAGULANT THERAPY: ICD-10-CM

## 2021-03-22 DIAGNOSIS — I48.20 CHRONIC ATRIAL FIBRILLATION (H): Primary | ICD-10-CM

## 2021-03-22 NOTE — PROGRESS NOTES
[place] [facility]  [place] Medical Record Number:  [id number]  Place of Service where encounter took place: [facility] ([facility]) [[id number]]    HPI:    Sandeep Tapia is a 86 year old  (10/27/1934), who is being seen today for an episodic care visit at the above location.   HPI information obtained from: facility chart records, facility staff, patient report and [facility] chart review. Today's concern is INR/Coumadin management for A. Fib    ROS/Subjective:  Bleeding Signs/Symptoms:  None  Thromboembolic Signs/Symptoms:  None  Medication Changes:  No  Dietary Changes:  No  Activity Changes: No  Bacterial/Viral Infection:  No  Missed Coumadin Doses:  None  On ASA: No  Other Concerns:  No    OBJECTIVE:  /65   Pulse 60   Temp 97  F (36.1  C)   Resp 18   Wt 68.9 kg (152 lb)   SpO2 97%   BMI 23.11 kg/m    Last INR: 1.7 on 3/15  INR Today:  2.0  Current Dose:  5mg MWF, 2.5mg ROW  INR Flow sheet at SNF:    ASSESSMENT:     Chronic atrial fibrillation (H)  Encounter for therapeutic drug monitoring  Long term current use of anticoagulant therapy  Therapeutic INR for goal of 2-3    PLAN:   Orders written by provider at facility  New Dose: same    Next INR: 3/29      Electronically signed by:  REBECCA Pisano CNP

## 2021-03-23 DIAGNOSIS — F33.0 MILD EPISODE OF RECURRENT MAJOR DEPRESSIVE DISORDER (H): ICD-10-CM

## 2021-03-23 RX ORDER — CITALOPRAM HYDROBROMIDE 20 MG/1
TABLET ORAL
Qty: 30 TABLET | Refills: 11 | Status: SHIPPED | OUTPATIENT
Start: 2021-03-23 | End: 2022-01-01

## 2021-03-28 VITALS
DIASTOLIC BLOOD PRESSURE: 65 MMHG | BODY MASS INDEX: 23.26 KG/M2 | RESPIRATION RATE: 18 BRPM | TEMPERATURE: 99.1 F | SYSTOLIC BLOOD PRESSURE: 125 MMHG | WEIGHT: 153 LBS | OXYGEN SATURATION: 97 % | HEART RATE: 60 BPM

## 2021-03-29 ENCOUNTER — ASSISTED LIVING VISIT (OUTPATIENT)
Dept: GERIATRICS | Facility: CLINIC | Age: 86
End: 2021-03-29
Payer: COMMERCIAL

## 2021-03-29 DIAGNOSIS — I48.20 CHRONIC ATRIAL FIBRILLATION (H): Primary | ICD-10-CM

## 2021-03-29 DIAGNOSIS — Z51.81 ENCOUNTER FOR THERAPEUTIC DRUG MONITORING: ICD-10-CM

## 2021-03-29 DIAGNOSIS — Z79.01 LONG TERM CURRENT USE OF ANTICOAGULANT THERAPY: ICD-10-CM

## 2021-04-04 VITALS
RESPIRATION RATE: 18 BRPM | OXYGEN SATURATION: 96 % | HEART RATE: 58 BPM | WEIGHT: 154 LBS | TEMPERATURE: 97.1 F | DIASTOLIC BLOOD PRESSURE: 50 MMHG | BODY MASS INDEX: 23.42 KG/M2 | SYSTOLIC BLOOD PRESSURE: 115 MMHG

## 2021-04-05 ENCOUNTER — ASSISTED LIVING VISIT (OUTPATIENT)
Dept: GERIATRICS | Facility: CLINIC | Age: 86
End: 2021-04-05
Payer: COMMERCIAL

## 2021-04-05 DIAGNOSIS — Z79.01 LONG TERM CURRENT USE OF ANTICOAGULANT THERAPY: ICD-10-CM

## 2021-04-05 DIAGNOSIS — Z51.81 ENCOUNTER FOR THERAPEUTIC DRUG MONITORING: ICD-10-CM

## 2021-04-05 DIAGNOSIS — I48.20 CHRONIC ATRIAL FIBRILLATION (H): Primary | ICD-10-CM

## 2021-04-05 NOTE — PROGRESS NOTES
[place] [facility]  [place] Medical Record Number:  [id number]  Place of Service where encounter took place: [facility] ([facility]) [[id number]]    HPI:    Sandeep Tapia is a 86 year old  (10/27/1934), who is being seen today for an episodic care visit at the above location.   HPI information obtained from: facility chart records, facility staff, patient report and [facility] chart review. Today's concern is INR/Coumadin management for A. Fib    ROS/Subjective:  Bleeding Signs/Symptoms:  None  Thromboembolic Signs/Symptoms:  None  Medication Changes:  No  Dietary Changes:  No  Activity Changes: No  Bacterial/Viral Infection:  No  Missed Coumadin Doses:  None  On ASA: No  Other Concerns:  No    OBJECTIVE:  /50   Pulse 58   Temp 97.1  F (36.2  C)   Resp 18   Wt 69.9 kg (154 lb)   SpO2 96%   BMI 23.42 kg/m    Last INR: 1.8 on 3/29  INR Today:  2.1  Current Dose:  5mg MWF, 2.5mg ROW  INR Flow sheet at SNF:    ASSESSMENT:     Chronic atrial fibrillation (H)  Encounter for therapeutic drug monitoring  Long term current use of anticoagulant therapy  Therapeutic INR for goal of 2-3    PLAN:   Orders written by provider at facility  New Dose: continue    Next INR: 4/12      Electronically signed by:  REBECCA Pisano CNP

## 2021-04-11 VITALS
HEART RATE: 58 BPM | DIASTOLIC BLOOD PRESSURE: 50 MMHG | RESPIRATION RATE: 18 BRPM | BODY MASS INDEX: 23.42 KG/M2 | OXYGEN SATURATION: 96 % | SYSTOLIC BLOOD PRESSURE: 115 MMHG | TEMPERATURE: 97.9 F | WEIGHT: 154 LBS

## 2021-04-12 ENCOUNTER — ASSISTED LIVING VISIT (OUTPATIENT)
Dept: GERIATRICS | Facility: CLINIC | Age: 86
End: 2021-04-12
Payer: COMMERCIAL

## 2021-04-12 DIAGNOSIS — F33.0 MILD EPISODE OF RECURRENT MAJOR DEPRESSIVE DISORDER (H): Primary | ICD-10-CM

## 2021-04-12 DIAGNOSIS — I48.20 CHRONIC ATRIAL FIBRILLATION (H): ICD-10-CM

## 2021-04-12 DIAGNOSIS — N18.30 STAGE 3 CHRONIC KIDNEY DISEASE, UNSPECIFIED WHETHER STAGE 3A OR 3B CKD (H): ICD-10-CM

## 2021-04-12 NOTE — PROGRESS NOTES
[place] [facility]  [place] Medical Record Number:  [id number]  Place of Service where encounter took place:  [facility] [[id number]]  Chief Complaint   Patient presents with     RECHECK       HPI:    Sandeep Tapia  is a 86 year old (10/27/1934), who is being seen today for an episodic care visit.  HPI information obtained from: facility chart records, facility staff, patient report and [facility] chart review. Today's concern is:     Mild episode of recurrent major depressive disorder (H)  Stage 3 chronic kidney disease, unspecified whether stage 3a or 3b CKD  Chronic atrial fibrillation (H)     Patient seen today in AL apartment, spouse recently moved down to memory care unit and feels a certain burden regarding, many questions regarding dementia, wonders why it wasn't him instead, therapeutic communication offered, understands its OK to visit and take a nap in bed together, recent labs GFR  42, INR today 2.5.    Past Medical and Surgical History reviewed in Epic today.    MEDICATIONS:    Current Outpatient Medications   Medication Sig Dispense Refill     acetaminophen (TYLENOL) 500 MG tablet Take 2 tablets (1,000 mg) by mouth every 8 hours as needed for mild pain 60 tablet 11     atorvastatin (LIPITOR) 20 MG tablet TAKE 1 TAB BY MOUTH IN THE EVENING FOR HDL 30 tablet 11     citalopram (CELEXA) 20 MG tablet TAKE 1 TAB BY MOUTH ONCE DAILY FOR DEPRESSION 30 tablet 11     levothyroxine (SYNTHROID/LEVOTHROID) 50 MCG tablet Take 1 tablet (50 mcg) by mouth daily 30 tablet 11     tamsulosin (FLOMAX) 0.4 MG capsule TAKE 1 CAP BY MOUTH ONCE DAILY FOR BPH 30 capsule 11     warfarin ANTICOAGULANT (COUMADIN) 1 MG tablet Take 1 tablet (1 mg) by mouth daily 30 tablet 11       REVIEW OF SYSTEMS:  4 point ROS including Respiratory, CV, GI and , other than that noted in the HPI,  is negative    Objective:  /50   Pulse 58   Temp 97.9  F (36.6  C)   Resp 18   Wt 69.9 kg (154 lb)    SpO2 96%   BMI 23.42 kg/m    Exam:  GENERAL APPEARANCE:  in no distress, appears healthy  ENT:  Mouth and posterior oropharynx normal, moist mucous membranes  RESP:  no respiratory distress  CV:  no edema  ABDOMEN:  no distension  M/S:   Gait and station abnormal unsteady  SKIN:  Inspection of skin and subcutaneous tissue baseline  NEURO:   Examination of sensation by touch normal  PSYCH:  oriented X 3    Labs:   Labs done in SNF are in [place] EPIC. Please refer to them using [facility]IC/Care Everywhere.    ASSESSMENT/PLAN:  Mild episode of recurrent major depressive disorder (H)  -depression exacerbated by spouse moving to memory care  -continue celexa 20mg daily  -consider GDS testing, particularly if spouse dies  -plan to trial mirtazapine first if indicated    Stage 3 chronic kidney disease, unspecified whether stage 3a or 3b CKD  -labs 3/8 creat 1.48, GFR 42  -dose medications renally as possible  -follow up BMP in 3-6 months    Chronic atrial fibrillation (H)  -INR on 4/5 2.1, warfarin 5mg MWF, 2.5mg ROW  -INR today 2.5  -warfarin continue above dosing  -recheck INR on 4/19        Electronically signed by:  REBECCA Pisano CNP

## 2021-04-19 ENCOUNTER — ASSISTED LIVING VISIT (OUTPATIENT)
Dept: GERIATRICS | Facility: CLINIC | Age: 86
End: 2021-04-19
Payer: COMMERCIAL

## 2021-04-19 VITALS
SYSTOLIC BLOOD PRESSURE: 120 MMHG | HEART RATE: 58 BPM | DIASTOLIC BLOOD PRESSURE: 60 MMHG | TEMPERATURE: 97.5 F | WEIGHT: 151 LBS | OXYGEN SATURATION: 96 % | BODY MASS INDEX: 22.96 KG/M2 | RESPIRATION RATE: 18 BRPM

## 2021-04-19 DIAGNOSIS — Z79.01 LONG TERM CURRENT USE OF ANTICOAGULANT THERAPY: ICD-10-CM

## 2021-04-19 DIAGNOSIS — Z51.81 ENCOUNTER FOR THERAPEUTIC DRUG MONITORING: ICD-10-CM

## 2021-04-19 DIAGNOSIS — I48.20 CHRONIC ATRIAL FIBRILLATION (H): Primary | ICD-10-CM

## 2021-04-19 NOTE — PROGRESS NOTES
[place] [facility]  [place] Medical Record Number:  [id number]  Place of Service where encounter took place: [facility] ([facility]) [[id number]]    HPI:    Sandeep Tapia is a 86 year old  (10/27/1934), who is being seen today for an episodic care visit at the above location.   HPI information obtained from: facility chart records, facility staff, patient report and [facility] chart review. Today's concern is INR/Coumadin management for A. Fib    ROS/Subjective:  Bleeding Signs/Symptoms:  None  Thromboembolic Signs/Symptoms:  None  Medication Changes:  No  Dietary Changes:  No  Activity Changes: No  Bacterial/Viral Infection:  No  Missed Coumadin Doses:  None  On ASA: No  Other Concerns:  No    OBJECTIVE:  /60   Pulse 58   Temp 97.5  F (36.4  C)   Resp 18   Wt 68.5 kg (151 lb)   SpO2 96%   BMI 22.96 kg/m    Last INR: 2.5 on 4/12  INR Today:  2.7  Current Dose:  5mg MWF, 2.5mg ROW  INR Flow sheet at SNF:    ASSESSMENT:     Chronic atrial fibrillation (H)  Long term current use of anticoagulant therapy  Encounter for therapeutic drug monitoring  Therapeutic INR for goal of 2-3    PLAN:   Transcribed on Bridge  New Dose: 5mg MWF, 2.5mg ROW    Next INR: 4/26      Electronically signed by:  REBECCA Pisano CNP

## 2021-04-26 ENCOUNTER — ASSISTED LIVING VISIT (OUTPATIENT)
Dept: GERIATRICS | Facility: CLINIC | Age: 86
End: 2021-04-26
Payer: COMMERCIAL

## 2021-04-26 VITALS
WEIGHT: 151 LBS | SYSTOLIC BLOOD PRESSURE: 120 MMHG | BODY MASS INDEX: 22.88 KG/M2 | RESPIRATION RATE: 18 BRPM | DIASTOLIC BLOOD PRESSURE: 60 MMHG | HEART RATE: 58 BPM | HEIGHT: 68 IN | TEMPERATURE: 97.3 F | OXYGEN SATURATION: 96 %

## 2021-04-26 DIAGNOSIS — I48.20 CHRONIC ATRIAL FIBRILLATION (H): ICD-10-CM

## 2021-04-26 DIAGNOSIS — Z51.81 ENCOUNTER FOR THERAPEUTIC DRUG MONITORING: ICD-10-CM

## 2021-04-26 DIAGNOSIS — F33.0 MILD EPISODE OF RECURRENT MAJOR DEPRESSIVE DISORDER (H): Primary | ICD-10-CM

## 2021-04-26 ASSESSMENT — MIFFLIN-ST. JEOR: SCORE: 1339.43

## 2021-04-26 NOTE — PROGRESS NOTES
"[place] [facility]  [place] [facility] Record Number:  [id number]  Place of Service where encounter took place:  [facility] ([facility] [[id number]]  Chief Complaint   Patient presents with     RECHECK       HPI:    Sandeep Tapia  is a 86 year old (10/27/1934), who is being seen today for an episodic care visit.  HPI information obtained from: facility chart records, facility staff, patient report and [facility] chart review. Today's concern is:     Mild episode of recurrent major depressive disorder (H)  Chronic atrial fibrillation (H)  Encounter for therapeutic drug monitoring     Patient seen today with concerns of depression, spouse is on hospice and starting to transition, has family support, potential for exacerbation of depression through process, pleasant personality, states being done with crying now and moving on to disbelief that this is happening, pending INR today, no distress.    Past Medical and Surgical History reviewed in Epic today.    MEDICATIONS:    Current Outpatient Medications   Medication Sig Dispense Refill     acetaminophen (TYLENOL) 500 MG tablet Take 2 tablets (1,000 mg) by mouth every 8 hours as needed for mild pain 60 tablet 11     atorvastatin (LIPITOR) 20 MG tablet TAKE 1 TAB BY MOUTH IN THE EVENING FOR HDL 30 tablet 11     citalopram (CELEXA) 20 MG tablet TAKE 1 TAB BY MOUTH ONCE DAILY FOR DEPRESSION 30 tablet 11     levothyroxine (SYNTHROID/LEVOTHROID) 50 MCG tablet Take 1 tablet (50 mcg) by mouth daily 30 tablet 11     tamsulosin (FLOMAX) 0.4 MG capsule TAKE 1 CAP BY MOUTH ONCE DAILY FOR BPH 30 capsule 11     warfarin ANTICOAGULANT (COUMADIN) 1 MG tablet Take 1 tablet (1 mg) by mouth daily 30 tablet 11         REVIEW OF SYSTEMS:  4 point ROS including Respiratory, CV, GI and , other than that noted in the HPI,  is negative    Objective:  /60   Pulse 58   Temp 97.3  F (36.3  C)   Resp 18   Ht 1.727 m (5' 8\")   Wt 68.5 kg (151 lb)   SpO2 96%   " BMI 22.96 kg/m    Exam:  GENERAL APPEARANCE:  in no distress, appears healthy  ENT:  Mouth and posterior oropharynx normal, moist mucous membranes  RESP:  lungs clear to auscultation   CV:  no edema, rate-normal  ABDOMEN:  bowel sounds normal  M/S:   Gait and station abnormal unsteady gait  SKIN:  Inspection of skin and subcutaneous tissue baseline  NEURO:   Examination of sensation by touch normal  PSYCH:  oriented X 3, memory impaired     Labs:   Labs done in SNF are in [place] EPIC. Please refer to them using [employer]/Care Everywhere.    ASSESSMENT/PLAN:  Mild episode of recurrent major depressive disorder (H)  -patients spouse is transitioning  -continue celexa 20mg for now  -consider GDS/PHQ testing after   -plan to either increase celexa or add mirtazapine for short period if indicated    Chronic atrial fibrillation (H)  Encounter for therapeutic drug monitoring  -INR on  was 2.7, warfarin 5mg MWF, 2.5mg ROW  -INR today 2.5  -warfarin 5mg MWF, 2.5mg ROW  -recheck INR on 5/3      Electronically signed by:  REBECCA Pisano CNP

## 2021-05-03 ENCOUNTER — ASSISTED LIVING VISIT (OUTPATIENT)
Dept: GERIATRICS | Facility: CLINIC | Age: 86
End: 2021-05-03
Payer: COMMERCIAL

## 2021-05-03 VITALS
WEIGHT: 151 LBS | HEART RATE: 58 BPM | OXYGEN SATURATION: 96 % | BODY MASS INDEX: 22.96 KG/M2 | SYSTOLIC BLOOD PRESSURE: 120 MMHG | DIASTOLIC BLOOD PRESSURE: 60 MMHG | RESPIRATION RATE: 18 BRPM | TEMPERATURE: 97.7 F

## 2021-05-03 DIAGNOSIS — I48.20 CHRONIC ATRIAL FIBRILLATION (H): ICD-10-CM

## 2021-05-03 DIAGNOSIS — N40.0 BENIGN PROSTATIC HYPERPLASIA WITHOUT LOWER URINARY TRACT SYMPTOMS: ICD-10-CM

## 2021-05-03 DIAGNOSIS — F33.0 MILD EPISODE OF RECURRENT MAJOR DEPRESSIVE DISORDER (H): Primary | ICD-10-CM

## 2021-05-03 NOTE — PROGRESS NOTES
[place] [facility]  [place] Medical Record Number:  [id number]  Place of Service where encounter took place:  [facility] ([facility] [[id number]]  Chief Complaint   Patient presents with     RECHECK       HPI:    Sandeep Tapia  is a 86 year old (10/27/1934), who is being seen today for an episodic care visit.  HPI information obtained from: facility chart records, facility staff, patient report and [facility] chart review. Today's concern is:     Mild episode of recurrent major depressive disorder (H)  Benign prostatic hyperplasia without lower urinary tract symptoms  Chronic atrial fibrillation (H)     Patient seen today for follow up, spouse  last week, appears slightly tired, pleasant, no distress, feels lonely, good family support, no plans for  at this time, denies issues with urination, continent, INR due today.    Past Medical and Surgical History reviewed in Epic today.    MEDICATIONS:    Current Outpatient Medications   Medication Sig Dispense Refill     acetaminophen (TYLENOL) 500 MG tablet Take 2 tablets (1,000 mg) by mouth every 8 hours as needed for mild pain 60 tablet 11     atorvastatin (LIPITOR) 20 MG tablet TAKE 1 TAB BY MOUTH IN THE EVENING FOR HDL 30 tablet 11     citalopram (CELEXA) 20 MG tablet TAKE 1 TAB BY MOUTH ONCE DAILY FOR DEPRESSION 30 tablet 11     levothyroxine (SYNTHROID/LEVOTHROID) 50 MCG tablet Take 1 tablet (50 mcg) by mouth daily 30 tablet 11     tamsulosin (FLOMAX) 0.4 MG capsule TAKE 1 CAP BY MOUTH ONCE DAILY FOR BPH 30 capsule 11     warfarin ANTICOAGULANT (COUMADIN) 1 MG tablet Take 1 tablet (1 mg) by mouth daily 30 tablet 11       REVIEW OF SYSTEMS:  4 point ROS including Respiratory, CV, GI and , other than that noted in the HPI,  is negative    Objective:  /60   Pulse 58   Temp 97.7  F (36.5  C)   Resp 18   Wt 68.5 kg (151 lb)   SpO2 96%   BMI 22.96 kg/m    Exam:  GENERAL APPEARANCE:  in no distress, appears  healthy  ENT:  Mouth and posterior oropharynx normal, moist mucous membranes  RESP:  lungs clear to auscultation   CV:  regular rate and rhythm, no murmur, rub, or gallop, no edema  ABDOMEN:  bowel sounds normal  M/S:   Gait and station abnormal using cane  SKIN:  Inspection of skin and subcutaneous tissue baseline  NEURO:   Examination of sensation by touch normal  PSYCH:  oriented X 3, memory impaired     Labs:   Labs done in SNF are in [place] EPIC. Please refer to them using [employer]/Care Everywhere.    ASSESSMENT/PLAN:  Mild episode of recurrent major depressive disorder (H)  -no overt s/s of depression, admits to being sad and lonely  -continue celexa 20mg for now  -if depression exacerbates plan on GDS testing  -consider increase in celexa    Benign prostatic hyperplasia without lower urinary tract symptoms  -denies symptoms, urine stream WNL, continent  -continue flomax daily  -refer to urology if indicated    Chronic atrial fibrillation (H)  -INR on 4/26 2.5, warfarin 5mg MWF, 2.5mg ROW  -INR today 2.4  -warfarin Same dose  -recheck INR on 5/17        Electronically signed by:  REBECCA Pisano CNP

## 2021-05-12 ENCOUNTER — ASSISTED LIVING VISIT (OUTPATIENT)
Dept: GERIATRICS | Facility: CLINIC | Age: 86
End: 2021-05-12
Payer: COMMERCIAL

## 2021-05-12 VITALS
OXYGEN SATURATION: 96 % | DIASTOLIC BLOOD PRESSURE: 60 MMHG | RESPIRATION RATE: 18 BRPM | TEMPERATURE: 97.3 F | HEART RATE: 58 BPM | BODY MASS INDEX: 22.96 KG/M2 | WEIGHT: 151 LBS | SYSTOLIC BLOOD PRESSURE: 120 MMHG

## 2021-05-12 DIAGNOSIS — N40.0 BENIGN PROSTATIC HYPERPLASIA, UNSPECIFIED WHETHER LOWER URINARY TRACT SYMPTOMS PRESENT: ICD-10-CM

## 2021-05-12 DIAGNOSIS — T14.8XXA HEMATOMA: Primary | ICD-10-CM

## 2021-05-12 DIAGNOSIS — R26.81 UNSTEADY GAIT: ICD-10-CM

## 2021-05-12 DIAGNOSIS — F32.A DEPRESSION, UNSPECIFIED DEPRESSION TYPE: ICD-10-CM

## 2021-05-12 DIAGNOSIS — Z87.820 HISTORY OF TRAUMATIC BRAIN INJURY: ICD-10-CM

## 2021-05-12 DIAGNOSIS — E03.9 HYPOTHYROIDISM, UNSPECIFIED TYPE: ICD-10-CM

## 2021-05-12 DIAGNOSIS — N18.31 STAGE 3A CHRONIC KIDNEY DISEASE (H): ICD-10-CM

## 2021-05-12 DIAGNOSIS — I48.91 ATRIAL FIBRILLATION, UNSPECIFIED TYPE (H): ICD-10-CM

## 2021-05-12 DIAGNOSIS — Z86.73 HISTORY OF CVA (CEREBROVASCULAR ACCIDENT): ICD-10-CM

## 2021-05-12 RX ORDER — MENTHOL 0.16 G/G
GEL TOPICAL DAILY PRN
COMMUNITY
End: 2022-01-01

## 2021-05-12 NOTE — LETTER
"    5/12/2021        RE: [facility]  [address] [address]         [facility]  PRIMARY CARE PROVIDER AND CLINIC:  REBECCA Pisano CNP, [address] [place] [address] [address] / [facility] [id number]  Chief Complaint   Patient presents with     Hospital F/U      [place] [facility] Record Number:  [id number]  Place of Service where encounter took place:  [facility] ([facility] [[id number]]    Sandeep Tapia  is a 86 year old  (10/27/1934), re-admitted to the above facility from  [facility]. Hospital stay 5/7/21 - 5/8/21. .     HPI:      This is an 86-year old male, with a past medical history significant for TBI, recurrent falls, CVA, possible cognitive impairment, atrial fibrillation on anticoagulation, depression, hypothyroidism and benign prostatic hypertrophy, who was admitted to [facility] 5/7/21 through 5/8/21 after hitting his head after a fall. A head CT revealed \"Imaging findings suspicious for a subtle subdural hematoma along the posterior interhemispheric fissure and tentorium.. Hyperdensity of the right frontal lobe blood in the region of chronic encephaomalacia given the incomplete suppression on the spectral images.. Leukoaraiosis present. 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 Hematoma was stable on follow-up imaging. Neurosurgery was consulted. No surgical intervention recommended. Started on Levetiracetam for seizure prophylaxis. Warfarin was held with instructions to restart on 5/13/21. Discharged back to [facility] when medically stable.    Today, patient is sitting in his apartment watching the news. States there is a lot going on in the world and he needs to stay updated. Reports he appreciates the concern, but he's fine. Does not want physical examination. Does not want home therapies. Is not sure if he has Neurosurgery follow-up set-up. Does not want to take medications " for seizures because he's never had one. Daughter and grandson will be over in about 10 minutes.    Per staff report, patient attempted to go outside today. Is getting used to using walker. Gait is very unsteady so they encouraged him to stay inside. Feel he would benefit from home therapies.    CODE STATUS/ADVANCE DIRECTIVES DISCUSSION:  No Order  DNR only  ALLERGIES: No Known Allergies   PAST MEDICAL HISTORY: No past medical history on file.   PAST SURGICAL HISTORY:   has no past surgical history on file.  FAMILY HISTORY: family history is not on file.  SOCIAL HISTORY:   reports that he has quit smoking. He has never used smokeless tobacco. He reports previous alcohol use.  Patient's living condition: lives in an assisted living facility    Post Discharge Medication Reconciliation Status: discharge medications reconciled, continue medications without change  Current Outpatient Medications   Medication Sig     acetaminophen (TYLENOL) 500 MG tablet Take 2 tablets (1,000 mg) by mouth every 8 hours as needed for mild pain     atorvastatin (LIPITOR) 20 MG tablet TAKE 1 TAB BY MOUTH IN THE EVENING FOR HDL     citalopram (CELEXA) 20 MG tablet TAKE 1 TAB BY MOUTH ONCE DAILY FOR DEPRESSION     levETIRAcetam (KEPPRA) 750 MG tablet Take 750 mg by mouth 2 times daily     levothyroxine (SYNTHROID/LEVOTHROID) 50 MCG tablet Take 1 tablet (50 mcg) by mouth daily     tamsulosin (FLOMAX) 0.4 MG capsule TAKE 1 CAP BY MOUTH ONCE DAILY FOR BPH     WARFARIN SODIUM PO On hold until 5/13/21 then Warfarin 5mg PO QD on MWF and Warfarin 2.5mg PO QD all other days. Recheck INR on 5/17/21     CARBOXYMETHYLCELLULOSE SODIUM OP Place 1 drop into both eyes every 6 hours as needed     Menthol, Topical Analgesic, (ICY HOT POWER) 16 % GEL Externally apply topically daily as needed     No current facility-administered medications for this visit.        ROS:  4 point ROS including Respiratory, CV, GI and , other than that noted in the HPI,  is  negative    Vitals:  /60   Pulse 58   Temp 97.3  F (36.3  C)   Resp 18   Wt 68.5 kg (151 lb)   SpO2 96%   BMI 22.96 kg/m    Exam:  GENERAL APPEARANCE:  Alert, in no distress  ENT:  Mouth and posterior oropharynx normal, moist mucous membranes  EYES:  EOM, conjunctivae, lids, pupils and irises normal  RESP:  no respiratory distress  M/S:   Active movement of bilateral upper extremities.  PSYCH:  oriented to person and place    Lab/Diagnostic data:  Labs done in SNF are in [place] EPIC. Please refer to them using [employer]/Care Everywhere.    ASSESSMENT/PLAN:  Small Frontal Lobe Intraparenchymal Hematoma. Secondary to a fall. Hematoma stable on follow-up imaging. No surgical intervention recommended. Follow-up with Neurosurgery in 2 weeks for repeat head CT. Warfarin on hold until 5/13/21 with goal to be therapeutic by Neurosurgery appointment. Levetiracetam ordered x 7 days for seizure prophylaxis. [facility]. Home Physical and Occupational Therapy ordered for strengthening and unsteady gait.    Atrial Fibrillation on Anticoagulation. Warfarin on hold due to above. Will re-initiate on 5/13/21 as recommended by Neurosurgery at previous dosing of  Warfarin 5mg PO QD on MWF and Warfarin 2.5mg PO QD all other days. Recheck INR on 5/17/21 as AL is unable to check INR on the weekends.     Chronic Kidney Disease Stage III. Baseline Creatinine ~ 1.4-1.5. Last Creatinine 1.29 on 5/8/21. Monitor periodically.     Anemia. Hemoglobin 11.2 on 5/7/21 -> 10.3 on 5/8/21. Recent baseline Hemoglobin ~ 9. Monitor periodically.     History of TBI with LOC and CVA. Continue Atorvastatin as ordered.    Benign Prostatic Hypertrophy. Continue Tamsulosin as ordered.    Hypothyroidism. Last TSH 2.47 on 3/3/21. Continue Levothyroxine as ordered.    Unsteady Gait. With recent fall. Home Physical and Occupational Therapy ordered.    Depression. With recent loss of wife. Continue Citalopram as  ordered.    Orders:  1. On 5/13/21, please resume Warfarin 5mg PO QD on MWF and Warfarin 2.5mg PO QD all other days Dx: A. Fib   2. Recheck INR on 5/17/21 Dx: A Fib    Electronically signed by:  REBECCA Alcaraz CNP         Documentation of Face-to-Face and Certification for Home Health Services     Patient: Sandeep Tapia   YOB: 1934  MR Number: [id number]  Today's Date: 5/14/2021    I certify that patient: Sandeep Tapia is under my care and that I, or a nurse practitioner or physician's assistant working with me, had a face-to-face encounter that meets the physician face-to-face encounter requirements with this patient on: 5/12/21.    This encounter with the patient was in whole, or in part, for the following medical condition, which is the primary reason for home health care: Small Frontal Lobe Intraparenchymal Hematoma.    I certify that, based on my findings, the following services are medically necessary home health services: Occupational Therapy and Physical Therapy.    My clinical findings support the need for the above services because: Occupational Therapy Services are needed to assess and treat cognitive ability and address ADL safety due to impairment in unsteady gait, Small Frontal Lobe Intraparenchymal Hematoma.. and Physical Therapy Services are needed to assess and treat the following functional impairments: Unsteady gait, Small Frontal Lobe Intraparenchymal Hematoma..    Further, I certify that my clinical findings support that this patient is homebound (i.e. absences from home require considerable and taxing effort and are for medical reasons or [employer] services or infrequently or of short duration when for other reasons) because: Requires assistance of another person or specialized equipment to access medical services because patient: Requires supervision of another for safe transfer...    Based on the above findings. I certify that this patient is confined to the home  and needs intermittent skilled nursing care, physical therapy and/or speech therapy.  The patient is under my care, and I have initiated the establishment of the plan of care.  This patient will be followed by a physician who will periodically review the plan of care.  Physician/Provider to provide follow up care: Kirby Fuentes    Responsible Medicare certified [employer] Physician: Dr. Brian Ruiz  Physician Signature: See electronic signature associated with these discharge orders.  Date: 5/14/2021                  Sincerely,        REBECCA Alcaraz CNP

## 2021-05-12 NOTE — PROGRESS NOTES
"[facility] GERIATRIC SERVICES  PRIMARY CARE PROVIDER AND CLINIC:  Kirby Fuentes, REBECCA CNP, [address] [place] [address] [address] / [facility] [id number]  Chief Complaint   Patient presents with     Hospital F/U      [place] Medical Record Number:  [id number]  Place of Service where encounter took place:  [facility] [[id number]]    Sandeep Tapia  is a 86 year old  (10/27/1934), re-admitted to the above facility from  [facility]. Hospital stay 5/7/21 - 5/8/21. .     HPI:      This is an 86-year old male, with a past medical history significant for TBI, recurrent falls, CVA, possible cognitive impairment, atrial fibrillation on anticoagulation, depression, hypothyroidism and benign prostatic hypertrophy, who was admitted to [facility] 5/7/21 through 5/8/21 after hitting his head after a fall. A head CT revealed \"Imaging findings suspicious for a subtle subdural hematoma along the posterior interhemispheric fissure and tentorium.. Hyperdensity of the right frontal lobe blood in the region of chronic encephaomalacia given the incomplete suppression on the spectral images.. Leukoaraiosis present. 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 Hematoma was stable on follow-up imaging. Neurosurgery was consulted. No surgical intervention recommended. Started on Levetiracetam for seizure prophylaxis. Warfarin was held with instructions to restart on 5/13/21. Discharged back to [facility] when medically stable.    Today, patient is sitting in his apartment watching the news. States there is a lot going on in the world and he needs to stay updated. Reports he appreciates the concern, but he's fine. Does not want physical examination. Does not want home therapies. Is not sure if he has Neurosurgery follow-up set-up. Does not want to take medications for seizures because he's never had one. Daughter and grandson will be over in about 10 minutes.    Per staff report, " patient attempted to go outside today. Is getting used to using walker. Gait is very unsteady so they encouraged him to stay inside. Feel he would benefit from home therapies.    CODE STATUS/ADVANCE DIRECTIVES DISCUSSION:  No Order  DNR only  ALLERGIES: No Known Allergies   PAST MEDICAL HISTORY: No past medical history on file.   PAST SURGICAL HISTORY:   has no past surgical history on file.  FAMILY HISTORY: family history is not on file.  SOCIAL HISTORY:   reports that he has quit smoking. He has never used smokeless tobacco. He reports previous alcohol use.  Patient's living condition: lives in an assisted living facility    Post Discharge Medication Reconciliation Status: discharge medications reconciled, continue medications without change  Current Outpatient Medications   Medication Sig     acetaminophen (TYLENOL) 500 MG tablet Take 2 tablets (1,000 mg) by mouth every 8 hours as needed for mild pain     atorvastatin (LIPITOR) 20 MG tablet TAKE 1 TAB BY MOUTH IN THE EVENING FOR HDL     citalopram (CELEXA) 20 MG tablet TAKE 1 TAB BY MOUTH ONCE DAILY FOR DEPRESSION     levETIRAcetam (KEPPRA) 750 MG tablet Take 750 mg by mouth 2 times daily     levothyroxine (SYNTHROID/LEVOTHROID) 50 MCG tablet Take 1 tablet (50 mcg) by mouth daily     tamsulosin (FLOMAX) 0.4 MG capsule TAKE 1 CAP BY MOUTH ONCE DAILY FOR BPH     WARFARIN SODIUM PO On hold until 5/13/21 then Warfarin 5mg PO QD on MWF and Warfarin 2.5mg PO QD all other days. Recheck INR on 5/17/21     CARBOXYMETHYLCELLULOSE SODIUM OP Place 1 drop into both eyes every 6 hours as needed     Menthol, Topical Analgesic, (ICY HOT POWER) 16 % GEL Externally apply topically daily as needed     No current facility-administered medications for this visit.        ROS:  4 point ROS including Respiratory, CV, GI and , other than that noted in the HPI,  is negative    Vitals:  /60   Pulse 58   Temp 97.3  F (36.3  C)   Resp 18   Wt 68.5 kg (151 lb)   SpO2 96%   BMI  22.96 kg/m    Exam:  GENERAL APPEARANCE:  Alert, in no distress  ENT:  Mouth and posterior oropharynx normal, moist mucous membranes  EYES:  EOM, conjunctivae, lids, pupils and irises normal  RESP:  no respiratory distress  M/S:   Active movement of bilateral upper extremities.  PSYCH:  oriented to person and place    Lab/Diagnostic data:  Labs done in SNF are in [place] EPIC. Please refer to them using [employer]/Care Everywhere.    ASSESSMENT/PLAN:  Small Frontal Lobe Intraparenchymal Hematoma. Secondary to a fall. Hematoma stable on follow-up imaging. No surgical intervention recommended. Follow-up with Neurosurgery in 2 weeks for repeat head CT. Warfarin on hold until 5/13/21 with goal to be therapeutic by Neurosurgery appointment. Levetiracetam ordered x 7 days for seizure prophylaxis. [facility]. Home Physical and Occupational Therapy ordered for strengthening and unsteady gait.    Atrial Fibrillation on Anticoagulation. Warfarin on hold due to above. Will re-initiate on 5/13/21 as recommended by Neurosurgery at previous dosing of  Warfarin 5mg PO QD on MWF and Warfarin 2.5mg PO QD all other days. Recheck INR on 5/17/21 as AL is unable to check INR on the weekends.     Chronic Kidney Disease Stage III. Baseline Creatinine ~ 1.4-1.5. Last Creatinine 1.29 on 5/8/21. Monitor periodically.     Anemia. Hemoglobin 11.2 on 5/7/21 -> 10.3 on 5/8/21. Recent baseline Hemoglobin ~ 9. Monitor periodically.     History of TBI with LOC and CVA. Continue Atorvastatin as ordered.    Benign Prostatic Hypertrophy. Continue Tamsulosin as ordered.    Hypothyroidism. Last TSH 2.47 on 3/3/21. Continue Levothyroxine as ordered.    Unsteady Gait. With recent fall. Home Physical and Occupational Therapy ordered.    Depression. With recent loss of wife. Continue Citalopram as ordered.    Orders:  1. On 5/13/21, please resume Warfarin 5mg PO QD on MWF and Warfarin 2.5mg PO QD all other days Dx: A. Fib   2.  Recheck INR on 5/17/21 Dx: A Fib    Electronically signed by:  REBECCA Alcaraz CNP         Documentation of Face-to-Face and Certification for Home Health Services     Patient: Sandeep Tapia   YOB: 1934  MR Number: [id number]  Today's Date: 5/14/2021    I certify that patient: Sandeep Tapia is under my care and that I, or a nurse practitioner or physician's assistant working with me, had a face-to-face encounter that meets the physician face-to-face encounter requirements with this patient on: 5/12/21.    This encounter with the patient was in whole, or in part, for the following medical condition, which is the primary reason for home health care: Small Frontal Lobe Intraparenchymal Hematoma.    I certify that, based on my findings, the following services are medically necessary home health services: Occupational Therapy and Physical Therapy.    My clinical findings support the need for the above services because: Occupational Therapy Services are needed to assess and treat cognitive ability and address ADL safety due to impairment in unsteady gait, Small Frontal Lobe Intraparenchymal Hematoma.. and Physical Therapy Services are needed to assess and treat the following functional impairments: Unsteady gait, Small Frontal Lobe Intraparenchymal Hematoma..    Further, I certify that my clinical findings support that this patient is homebound (i.e. absences from home require considerable and taxing effort and are for medical reasons or Holiness services or infrequently or of short duration when for other reasons) because: Requires assistance of another person or specialized equipment to access medical services because patient: Requires supervision of another for safe transfer...    Based on the above findings. I certify that this patient is confined to the home and needs intermittent skilled nursing care, physical therapy and/or speech therapy.  The patient is under my care, and I have  initiated the establishment of the plan of care.  This patient will be followed by a physician who will periodically review the plan of care.  Physician/Provider to provide follow up care: Kirby Fuentes    Responsible Medicare certified PECOS Physician: Dr. Brian Ruiz  Physician Signature: See electronic signature associated with these discharge orders.  Date: 5/14/2021

## 2021-05-14 RX ORDER — LEVETIRACETAM 750 MG/1
750 TABLET ORAL 2 TIMES DAILY
COMMUNITY
Start: 2021-05-07 | End: 2021-11-10

## 2021-05-17 ENCOUNTER — ASSISTED LIVING VISIT (OUTPATIENT)
Dept: GERIATRICS | Facility: CLINIC | Age: 86
End: 2021-05-17
Payer: COMMERCIAL

## 2021-05-17 VITALS
OXYGEN SATURATION: 94 % | BODY MASS INDEX: 22.9 KG/M2 | DIASTOLIC BLOOD PRESSURE: 66 MMHG | WEIGHT: 150.6 LBS | SYSTOLIC BLOOD PRESSURE: 138 MMHG | TEMPERATURE: 97.7 F | HEART RATE: 63 BPM | RESPIRATION RATE: 17 BRPM

## 2021-05-17 DIAGNOSIS — F33.0 MILD EPISODE OF RECURRENT MAJOR DEPRESSIVE DISORDER (H): Primary | ICD-10-CM
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
## 2021-05-17 DIAGNOSIS — I48.20 CHRONIC ATRIAL FIBRILLATION (H): ICD-10-CM

## 2021-05-17 NOTE — PROGRESS NOTES
"[facility] GERIATRIC SERVICES  [place] Medical Record Number:  [id number]  Place of Service where encounter took place:  [facility] [[id number]]  Chief Complaint   Patient presents with     RECHECK       HPI:    Sandeep Tapia  is a 86 year old (10/27/1934), who is being seen today for an episodic care visit.  HPI information obtained from: facility chart records, facility staff, patient report and [facility] chart review. Today's concern is:     Mild episode of recurrent major depressive disorder (H)  Fall, subsequent encounter  Chronic atrial fibrillation (H)     Patient seen today in room, practicing with new walker, had fall on  no injury but was sent to [facility] to to be checked over, INR due today, in respect of depression no overt s/s even though spouse  recently, states missing her, states being \"cried-out\".    Past Medical and Surgical History reviewed in Epic today.    MEDICATIONS:    Current Outpatient Medications   Medication Sig Dispense Refill     acetaminophen (TYLENOL) 500 MG tablet Take 2 tablets (1,000 mg) by mouth every 8 hours as needed for mild pain 60 tablet 11     atorvastatin (LIPITOR) 20 MG tablet TAKE 1 TAB BY MOUTH IN THE EVENING FOR HDL 30 tablet 11     CARBOXYMETHYLCELLULOSE SODIUM OP Place 1 drop into both eyes every 6 hours as needed       citalopram (CELEXA) 20 MG tablet TAKE 1 TAB BY MOUTH ONCE DAILY FOR DEPRESSION 30 tablet 11     levETIRAcetam (KEPPRA) 750 MG tablet Take 750 mg by mouth 2 times daily       levothyroxine (SYNTHROID/LEVOTHROID) 50 MCG tablet Take 1 tablet (50 mcg) by mouth daily 30 tablet 11     Menthol, Topical Analgesic, (ICY HOT POWER) 16 % GEL Externally apply topically daily as needed       tamsulosin (FLOMAX) 0.4 MG capsule TAKE 1 CAP BY MOUTH ONCE DAILY FOR BPH 30 capsule 11     WARFARIN SODIUM PO On hold until 21 then Warfarin 5mg PO QD on MWF and Warfarin 2.5mg PO QD all other days. Recheck INR on 21   "       REVIEW OF SYSTEMS:  4 point ROS including Respiratory, CV, GI and , other than that noted in the HPI,  is negative    Objective:  /66   Pulse 63   Temp 97.7  F (36.5  C)   Resp 17   Wt 68.3 kg (150 lb 9.6 oz)   SpO2 94%   BMI 22.90 kg/m    Exam:  GENERAL APPEARANCE:  in no distress, appears healthy  ENT:  Mouth and posterior oropharynx normal, moist mucous membranes  RESP:  lungs clear to auscultation   CV:  no edema, rate-normal  ABDOMEN:  bowel sounds normal  M/S:   Gait and station abnormal requires DME  SKIN:  Inspection of skin and subcutaneous tissue baseline  NEURO:   Examination of sensation by touch normal  PSYCH:  oriented X 3    Labs:   Labs done in SNF are in [place] EPIC. Please refer to them using [facility]/Care Everywhere.    ASSESSMENT/PLAN:  Mild episode of recurrent major depressive disorder (H)  -recently spouse , appears to eat well, active  -continue celexa 20mg  -if symptomatic plan to test GDS/PHQ    Fall, subsequent encounter  -single incident walking outside, no reported injuries  -PT/OT eval and treat  -continue practice with walker    Chronic atrial fibrillation (H)  -INR on 5/3 was 2.4, warfarin 5mg MWF, 2.5mg ROW  -INR today 1.4  -warfarin 2.5mg MWF, 5mg ROW  -recheck on       Electronically signed by:  REBECCA Pisano CNP

## 2021-05-24 ENCOUNTER — ASSISTED LIVING VISIT (OUTPATIENT)
Dept: GERIATRICS | Facility: CLINIC | Age: 86
End: 2021-05-24
Payer: COMMERCIAL

## 2021-05-24 VITALS
OXYGEN SATURATION: 94 % | BODY MASS INDEX: 22.9 KG/M2 | HEART RATE: 63 BPM | TEMPERATURE: 96.8 F | SYSTOLIC BLOOD PRESSURE: 138 MMHG | WEIGHT: 150.6 LBS | RESPIRATION RATE: 17 BRPM | DIASTOLIC BLOOD PRESSURE: 66 MMHG

## 2021-05-24 DIAGNOSIS — N40.0 BENIGN PROSTATIC HYPERPLASIA WITHOUT LOWER URINARY TRACT SYMPTOMS: ICD-10-CM

## 2021-05-24 DIAGNOSIS — E03.9 HYPOTHYROIDISM, UNSPECIFIED TYPE: ICD-10-CM

## 2021-05-24 DIAGNOSIS — F33.0 MILD EPISODE OF RECURRENT MAJOR DEPRESSIVE DISORDER (H): Primary | ICD-10-CM

## 2021-05-24 DIAGNOSIS — I10 HYPERTENSION, UNSPECIFIED TYPE: ICD-10-CM

## 2021-05-24 DIAGNOSIS — I48.20 CHRONIC ATRIAL FIBRILLATION (H): ICD-10-CM

## 2021-05-24 NOTE — PROGRESS NOTES
[facility] GERIATRIC SERVICES  Chief Complaint   Patient presents with     Annual Comprehensive Exam Assisted Living     [place] Medical Record Number:  [id number]  Place of Service where encounter took place:  [facility] [[id number]]    HPI:    Sandeep Tapia  is a 86 year old  (10/27/1934), who is being seen today for an annual comprehensive visit. HPI information obtained from: facility chart records, facility staff, patient report and [facility] chart review.  Today's concerns are:     Mild episode of recurrent major depressive disorder (H)  Benign prostatic hyperplasia without lower urinary tract symptoms  Hypothyroidism, unspecified type  Chronic atrial fibrillation (H)  Hypertension, unspecified type     Patient seen today for follow up, no overt s/s depression since spouse , zoom  is on , appears prepared to celebrate her life, denies issues with urine stream/micturation, recent TSH 2.47, INR on  was 1.4 unknown etiology why low but diet may have changed, SBP's in the 130-140 range, no distress.      ALLERGIES: Patient has no known allergies.  PAST MEDICAL HISTORY:  has no past medical history on file.  PAST SURGICAL HISTORY:  has no past surgical history on file.  IMMUNIZATIONS:  Immunization History   Administered Date(s) Administered     COVID-19,PF,Moderna 2021, 2021     Influenza Vaccine IM > 6 months Valent IIV4 10/27/2020     Tdap (Adacel,Boostrix) 10/20/2020     Above immunizations pulled from [facility]. MIIC and facility records also reconciled. Outstanding information sent to  to update [facility].  Future immunizations needed:  yearly influenza per facility protocol    Current Outpatient Medications   Medication Sig Dispense Refill     acetaminophen (TYLENOL) 500 MG tablet Take 2 tablets (1,000 mg) by mouth every 8 hours as needed for mild pain 60 tablet 11     atorvastatin (LIPITOR) 20 MG tablet TAKE 1 TAB BY  MOUTH IN THE EVENING FOR HDL 30 tablet 11     CARBOXYMETHYLCELLULOSE SODIUM OP Place 1 drop into both eyes every 6 hours as needed       citalopram (CELEXA) 20 MG tablet TAKE 1 TAB BY MOUTH ONCE DAILY FOR DEPRESSION 30 tablet 11     levETIRAcetam (KEPPRA) 750 MG tablet Take 750 mg by mouth 2 times daily       levothyroxine (SYNTHROID/LEVOTHROID) 50 MCG tablet Take 1 tablet (50 mcg) by mouth daily 30 tablet 11     Menthol, Topical Analgesic, (ICY HOT POWER) 16 % GEL Externally apply topically daily as needed       tamsulosin (FLOMAX) 0.4 MG capsule TAKE 1 CAP BY MOUTH ONCE DAILY FOR BPH 30 capsule 11     WARFARIN SODIUM PO On hold until 5/13/21 then Warfarin 5mg PO QD on MWF and Warfarin 2.5mg PO QD all other days. Recheck INR on 5/17/21           Case Management:  I have reviewed the Assisted Living care plan, current immunizations and preventive care/cancer screening. .Future cancer screening is not clinically indicated secondary to age/goals of care Patient's desire to return to the community is present, but is not able due to care needs . Current Level of Care is appropriate.    Advance Directive Discussion:    I reviewed the current advanced directives as reflected in EPIC, the POLST and the facility chart, and verified the congruency of orders. I contacted the first party patient and discussed the plan of Care.  I did review the advance directives with the resident.     Team Discussion:  I communicated with the appropriate disciplines involved with the Plan of Care:   Nursing    Patient's goal is pain control and comfort.  Information reviewed:  Medications, vital signs, orders, and nursing notes.    ROS:  4 point ROS including Respiratory, CV, GI and , other than that noted in the HPI,  is negative    Vitals:  /66   Pulse 63   Temp 96.8  F (36  C)   Resp 17   Wt 68.3 kg (150 lb 9.6 oz)   SpO2 94%   BMI 22.90 kg/m   Body mass index is 22.9 kg/m .  Exam:  GENERAL APPEARANCE:  in no distress,  appears healthy  ENT:  Mouth and posterior oropharynx normal, moist mucous membranes, normal hearing acuity  RESP:  lungs clear to auscultation , no respiratory distress  CV:  regular rate and rhythm, no murmur, rub, or gallop, no edema  ABDOMEN:  no guarding or rebound, bowel sounds normal  M/S:   Gait and station abnormal using cane, walker for distance  SKIN:  Inspection of skin and subcutaneous tissue baseline, Palpation of skin and subcutaneous tissue baseline  NEURO:   Cranial nerves 2-12 are normal tested and grossly at patient's baseline, Examination of sensation by touch normal  PSYCH:  oriented X 3, affect and mood normal     Lab/Diagnostic data:   Labs done in SNF are in [place] EPIC. Please refer to them using [employer]/Care Everywhere.    ASSESSMENT/PLAN  Mild episode of recurrent major depressive disorder (H)  -spouse  recently,  this weekend  -no overt s/s depression  -continue celexa 20mg  -plan GDS/PHQ testing in future to evaluate    Benign prostatic hyperplasia without lower urinary tract symptoms  -denies s/s urinary issues  -continues flomax daily  -follow up urology PRN    Hypothyroidism, unspecified type  -TSH on 3/3/21 was 2.47  -continue levothyroxine 50mcg  -follow up TSH in 3-6 months    Chronic atrial fibrillation (H)  -INR on  was 1.4, warfarin 2.5mg MWF, 5mg ROW  -INR today 2.7  -warfarin 5mg MWF, 2.5mg ROW  -recheck INR on     Hypertension, unspecified type  -SBP's in the 130-140 range, WNL, ambulatory  -no medication intervention indicated  -staff to check VS as scheduled      Electronically signed by:  REBECCA Pisano CNP

## 2021-06-07 ENCOUNTER — ASSISTED LIVING VISIT (OUTPATIENT)
Dept: GERIATRICS | Facility: CLINIC | Age: 86
End: 2021-06-07
Payer: COMMERCIAL

## 2021-06-07 VITALS
TEMPERATURE: 97.3 F | SYSTOLIC BLOOD PRESSURE: 138 MMHG | HEART RATE: 63 BPM | WEIGHT: 150.6 LBS | OXYGEN SATURATION: 95 % | RESPIRATION RATE: 17 BRPM | BODY MASS INDEX: 22.9 KG/M2 | DIASTOLIC BLOOD PRESSURE: 66 MMHG

## 2021-06-07 DIAGNOSIS — Z51.81 ENCOUNTER FOR THERAPEUTIC DRUG MONITORING: ICD-10-CM

## 2021-06-07 DIAGNOSIS — I48.20 CHRONIC ATRIAL FIBRILLATION (H): Primary | ICD-10-CM

## 2021-06-07 DIAGNOSIS — Z79.01 LONG TERM CURRENT USE OF ANTICOAGULANT THERAPY: ICD-10-CM

## 2021-06-09 ENCOUNTER — TRANSFERRED RECORDS (OUTPATIENT)
Dept: HEALTH INFORMATION MANAGEMENT | Facility: CLINIC | Age: 86
End: 2021-06-09

## 2021-06-10 VITALS
WEIGHT: 150.6 LBS | TEMPERATURE: 97.3 F | SYSTOLIC BLOOD PRESSURE: 138 MMHG | BODY MASS INDEX: 22.9 KG/M2 | DIASTOLIC BLOOD PRESSURE: 66 MMHG | OXYGEN SATURATION: 98 % | HEART RATE: 63 BPM | RESPIRATION RATE: 17 BRPM

## 2021-06-10 NOTE — PROGRESS NOTES
[facility] GERIATRIC SERVICES  [place] Medical Record Number:  [id number]  Place of Service where encounter took place:  [facility] [[id number]]  Chief Complaint   Patient presents with     RECHECK       HPI:    Sandeep Tapia  is a 86 year old (10/27/1934), who is being seen today for an episodic care visit.  HPI information obtained from: facility chart records, facility staff, patient report and [facility] chart review. Today's concern is:     Subdural hematoma (H)  Pulmonary nodule, right  Chronic atrial fibrillation (H)     Patient seen today for follow up, daughter present, seen by [facility] neurology and MD called me on 6/9 regarding brain CT results, has non-resolving hematoma plus some fluid collection, wants to hold warfarin and recheck CT in 2 weeks to evaluate for improvement, I concurred with plan along with the family, of note long term warfarin therapy for Afib, today pleasant, active in apartment, no distress.    Past Medical and Surgical History reviewed in Epic today.    MEDICATIONS:    Current Outpatient Medications   Medication Sig Dispense Refill     acetaminophen (TYLENOL) 500 MG tablet Take 2 tablets (1,000 mg) by mouth every 8 hours as needed for mild pain 60 tablet 11     atorvastatin (LIPITOR) 20 MG tablet TAKE 1 TAB BY MOUTH IN THE EVENING FOR HDL 30 tablet 11     CARBOXYMETHYLCELLULOSE SODIUM OP Place 1 drop into both eyes every 6 hours as needed       citalopram (CELEXA) 20 MG tablet TAKE 1 TAB BY MOUTH ONCE DAILY FOR DEPRESSION 30 tablet 11     levETIRAcetam (KEPPRA) 750 MG tablet Take 750 mg by mouth 2 times daily       levothyroxine (SYNTHROID/LEVOTHROID) 50 MCG tablet Take 1 tablet (50 mcg) by mouth daily 30 tablet 11     Menthol, Topical Analgesic, (ICY HOT POWER) 16 % GEL Externally apply topically daily as needed       tamsulosin (FLOMAX) 0.4 MG capsule TAKE 1 CAP BY MOUTH ONCE DAILY FOR BPH 30 capsule 11     WARFARIN SODIUM PO On hold until 5/13/21 then  Warfarin 5mg PO QD on MWF and Warfarin 2.5mg PO QD all other days. Recheck INR on 5/17/21         REVIEW OF SYSTEMS:  4 point ROS including Respiratory, CV, GI and , other than that noted in the HPI,  is negative    Objective:  /66   Pulse 63   Temp 97.3  F (36.3  C)   Resp 17   Wt 68.3 kg (150 lb 9.6 oz)   SpO2 98%   BMI 22.90 kg/m    Exam:  GENERAL APPEARANCE:  in no distress, appears healthy  ENT:  Mouth and posterior oropharynx normal, moist mucous membranes  RESP:  lungs clear to auscultation   CV:  regular rate and rhythm, no murmur, rub, or gallop, no edema  ABDOMEN:  bowel sounds normal  M/S:   Gait and station normal  SKIN:  Inspection of skin and subcutaneous tissue baseline  NEURO:   Examination of sensation by touch normal  PSYCH:  oriented X 3    Labs:   Labs done in SNF are in [place] EPIC. Please refer to them using [employer]/Care Everywhere.    ASSESSMENT/PLAN:  Subdural hematoma (H)  Pulmonary nodule, right  Chronic atrial fibrillation (H)  -hold warfarin 6/9 until further notice  -start AS81mg 6/10  -follow up neurology with head CT on 6/22 plus clinic appt  -MD neurology to contact me with change in plan  -pulmonary nodule 0.5cm diameter; per radiologist report recommend follow-up CT in 12 months, family aware and copy of report given (pics on patients cell phone)  -INR on 6/14 to ensure close to 1.0        Electronically signed by:  REBECCA Pisano CNP

## 2021-06-11 ENCOUNTER — ASSISTED LIVING VISIT (OUTPATIENT)
Dept: GERIATRICS | Facility: CLINIC | Age: 86
End: 2021-06-11
Payer: COMMERCIAL

## 2021-06-11 DIAGNOSIS — S06.5XAA SUBDURAL HEMATOMA (H): Primary | ICD-10-CM

## 2021-06-11 DIAGNOSIS — I48.20 CHRONIC ATRIAL FIBRILLATION (H): ICD-10-CM

## 2021-06-11 DIAGNOSIS — R91.1 PULMONARY NODULE, RIGHT: ICD-10-CM

## 2021-06-14 ENCOUNTER — ASSISTED LIVING VISIT (OUTPATIENT)
Dept: GERIATRICS | Facility: CLINIC | Age: 86
End: 2021-06-14
Payer: COMMERCIAL

## 2021-06-14 VITALS
BODY MASS INDEX: 22.82 KG/M2 | WEIGHT: 150.6 LBS | HEART RATE: 63 BPM | SYSTOLIC BLOOD PRESSURE: 138 MMHG | HEIGHT: 68 IN | DIASTOLIC BLOOD PRESSURE: 66 MMHG | OXYGEN SATURATION: 96 % | RESPIRATION RATE: 17 BRPM | TEMPERATURE: 97 F

## 2021-06-14 DIAGNOSIS — I48.20 CHRONIC ATRIAL FIBRILLATION (H): Primary | ICD-10-CM

## 2021-06-14 DIAGNOSIS — Z51.81 ENCOUNTER FOR THERAPEUTIC DRUG MONITORING: ICD-10-CM

## 2021-06-14 DIAGNOSIS — Z79.01 LONG TERM CURRENT USE OF ANTICOAGULANT THERAPY: ICD-10-CM

## 2021-06-14 ASSESSMENT — MIFFLIN-ST. JEOR: SCORE: 1337.62

## 2021-06-14 NOTE — PROGRESS NOTES
"[facility] GERIATRIC SERVICES  [place] Medical Record Number:  [id number]  Place of Service where encounter took place: [facility] ([facility]) [[id number]]    HPI:    Sandeep Tapia is a 86 year old  (10/27/1934), who is being seen today for an episodic care visit at the above location.   HPI information obtained from: facility chart records, facility staff, patient report and [facility] chart review. Today's concern is INR/Coumadin management for A. Fib    ROS/Subjective:  Bleeding Signs/Symptoms:  None  Thromboembolic Signs/Symptoms:  None  Medication Changes:  No  Dietary Changes:  No  Activity Changes: No  Bacterial/Viral Infection:  No  Missed Coumadin Doses:  None  On ASA: No  Other Concerns:  No    OBJECTIVE:  /66   Pulse 63   Temp 97  F (36.1  C)   Resp 17   Ht 1.727 m (5' 8\")   Wt 68.3 kg (150 lb 9.6 oz)   SpO2 96%   BMI 22.90 kg/m    Last INR: 2.7 on 5/24  INR Today:  1.3  Current Dose:  5mg T/Th, 2.5mg ROW  INR Flow sheet at [facility]:    ASSESSMENT:     Chronic atrial fibrillation (H)  Long term current use of anticoagulant therapy  Encounter for therapeutic drug monitoring  Therapeutic INR for goal of 2-3    PLAN:     New Dose: 5mg MWF, 2.5mg ROW    Next INR: 6/21      Electronically signed by:  REBECCA Pisano CNP       "

## 2021-06-22 NOTE — PROGRESS NOTES
[place] [facility]  [place] Medical Record Number:  [id number]  Place of Service where encounter took place:  [facility] [[id number]]  Chief Complaint   Patient presents with     RECHECK       HPI:    Sandeep Tapia  is a 86 year old (10/27/1934), who is being seen today for an episodic care visit.  HPI information obtained from: facility chart records, facility staff, patient report and [facility] chart review. Today's concern is:     Subdural hematoma (H)  History of CVA (cerebrovascular accident)  Chronic atrial fibrillation (H)     Per neurology on 6/22: Sandeep Tapia is a 86 y.o. male status post right frontal IPH and SDH here for follow-up appointment. Today head CT shows slight decrease with his right convexity subdural hematoma. Neurologically he is at his baseline. Patient is taking aspirin 81 mg daily but not taking Coumadin.    Patient today states having no adverse reaction from change to ASA from warfarin, slight concerns regarding being off warfarin, denies having CP/palpitations, cognitively intact, no distress.       Past Medical and Surgical History reviewed in Epic today.    MEDICATIONS:    Current Outpatient Medications   Medication Sig Dispense Refill     aspirin (ASA) 81 MG chewable tablet Take 1 tablet (81 mg) by mouth daily 30 tablet 0     acetaminophen (TYLENOL) 500 MG tablet Take 2 tablets (1,000 mg) by mouth every 8 hours as needed for mild pain 60 tablet 11     atorvastatin (LIPITOR) 20 MG tablet TAKE 1 TAB BY MOUTH IN THE EVENING FOR HDL 30 tablet 11     CARBOXYMETHYLCELLULOSE SODIUM OP Place 1 drop into both eyes every 6 hours as needed       citalopram (CELEXA) 20 MG tablet TAKE 1 TAB BY MOUTH ONCE DAILY FOR DEPRESSION 30 tablet 11     levETIRAcetam (KEPPRA) 750 MG tablet Take 750 mg by mouth 2 times daily       levothyroxine (SYNTHROID/LEVOTHROID) 50 MCG tablet Take 1 tablet (50 mcg) by mouth daily 30 tablet 11     Menthol, Topical Analgesic,  "(ICY HOT POWER) 16 % GEL Externally apply topically daily as needed       tamsulosin (FLOMAX) 0.4 MG capsule TAKE 1 CAP BY MOUTH ONCE DAILY FOR BPH 30 capsule 11       REVIEW OF SYSTEMS:  4 point ROS including Respiratory, CV, GI and , other than that noted in the HPI,  is negative    Objective:  /57   Pulse 67   Temp 98.6  F (37  C)   Resp 18   Ht 1.727 m (5' 8\")   Wt 68.8 kg (151 lb 9.6 oz)   SpO2 96%   BMI 23.05 kg/m    Exam:  GENERAL APPEARANCE:  in no distress, appears healthy  ENT:  Mouth and posterior oropharynx normal, moist mucous membranes  RESP:  lungs clear to auscultation   CV:  no edema, rate-normal  ABDOMEN:  bowel sounds normal  M/S:   Gait and station abnormal using walker not  SKIN:  Inspection of skin and subcutaneous tissue baseline  NEURO:   Examination of sensation by touch normal  PSYCH:  oriented X 3    Labs:   Labs done in SNF are in [place] EPIC. Please refer to them using [employer]/Care Everywhere.    ASSESSMENT/PLAN:  Subdural hematoma (H)  History of CVA (cerebrovascular accident)  Chronic atrial fibrillation (H)  -hematoma improving with held warfarin  -continue ASA 81mg  -discontinue warfarin   -follow up CT and neurology appt on 7/20  -cancel INR rechecks  -staff to check VS as scheduled  -unsure if plan to restart warfarin even after hematoma is resolved, will consult with patient, family and neurology suggestions        Electronically signed by:  REBECCA Pisano CNP         "

## 2021-06-23 ENCOUNTER — ASSISTED LIVING VISIT (OUTPATIENT)
Dept: GERIATRICS | Facility: CLINIC | Age: 86
End: 2021-06-23
Payer: COMMERCIAL

## 2021-06-23 VITALS
OXYGEN SATURATION: 96 % | BODY MASS INDEX: 22.97 KG/M2 | RESPIRATION RATE: 18 BRPM | HEIGHT: 68 IN | DIASTOLIC BLOOD PRESSURE: 57 MMHG | TEMPERATURE: 98.6 F | WEIGHT: 151.6 LBS | HEART RATE: 67 BPM | SYSTOLIC BLOOD PRESSURE: 135 MMHG

## 2021-06-23 DIAGNOSIS — I48.20 CHRONIC ATRIAL FIBRILLATION (H): ICD-10-CM

## 2021-06-23 DIAGNOSIS — Z86.73 HISTORY OF CVA (CEREBROVASCULAR ACCIDENT): Primary | ICD-10-CM

## 2021-06-23 DIAGNOSIS — S06.5XAA SUBDURAL HEMATOMA (H): ICD-10-CM

## 2021-06-23 RX ORDER — ASPIRIN 81 MG/1
81 TABLET, CHEWABLE ORAL DAILY
Qty: 30 TABLET | Refills: 0
Start: 2021-06-23 | End: 2022-01-01

## 2021-06-23 ASSESSMENT — MIFFLIN-ST. JEOR: SCORE: 1342.15

## 2021-07-06 ENCOUNTER — ASSISTED LIVING VISIT (OUTPATIENT)
Dept: GERIATRICS | Facility: CLINIC | Age: 86
End: 2021-07-06
Payer: COMMERCIAL

## 2021-07-06 VITALS
WEIGHT: 151 LBS | DIASTOLIC BLOOD PRESSURE: 57 MMHG | HEART RATE: 67 BPM | TEMPERATURE: 97.5 F | OXYGEN SATURATION: 95 % | RESPIRATION RATE: 18 BRPM | SYSTOLIC BLOOD PRESSURE: 135 MMHG | BODY MASS INDEX: 22.96 KG/M2

## 2021-07-06 DIAGNOSIS — S06.5XAA SUBDURAL HEMATOMA (H): Primary | ICD-10-CM

## 2021-07-06 DIAGNOSIS — I48.20 CHRONIC ATRIAL FIBRILLATION (H): ICD-10-CM

## 2021-07-06 DIAGNOSIS — Z86.73 HISTORY OF CVA (CEREBROVASCULAR ACCIDENT): ICD-10-CM

## 2021-07-06 NOTE — PROGRESS NOTES
[place] [facility]  Chief Complaint   Patient presents with     halfway Regulatory     [place] Medical Record Number:  [id number]  Place of Service where encounter took place[facility] [[id number]]    HPI:    Sandeep Tapia  is 86 year old (10/27/1934), who is being seen today for a federally mandated E/M visit.  HPI information obtained from: facility chart records, facility staff, patient report and [place] Epic chart review. Today's concerns are:     Subdural hematoma (H)  History of CVA (cerebrovascular accident)  Chronic atrial fibrillation (H)     Patient seen by [place] neurology on 6/9, head CT shows slight decrease with his right convexity subdural hematoma, no s/s repeat CVA, mentation at baseline, warfarin stopped and ASA started, deneis CP/palpitations, patient wants to start drinking ETOH again if plan is to not restart warfarin, unsure if neuro will suggest he restarts warfarin, no distress.       ALLERGIES:Patient has no known allergies.  PAST MEDICAL HISTORY:   has no past medical history on file.  PAST SURGICAL HISTORY:   has no past surgical history on file.  FAMILY HISTORY: family history is not on file.  SOCIAL HISTORY:  reports that he has quit smoking. He has never used smokeless tobacco. He reports previous alcohol use.    MEDICATIONS:  Current Outpatient Medications   Medication Sig Dispense Refill     acetaminophen (TYLENOL) 500 MG tablet Take 2 tablets (1,000 mg) by mouth every 8 hours as needed for mild pain 60 tablet 11     aspirin (ASA) 81 MG chewable tablet Take 1 tablet (81 mg) by mouth daily 30 tablet 0     atorvastatin (LIPITOR) 20 MG tablet TAKE 1 TAB BY MOUTH IN THE EVENING FOR HDL 30 tablet 11     CARBOXYMETHYLCELLULOSE SODIUM OP Place 1 drop into both eyes every 6 hours as needed       citalopram (CELEXA) 20 MG tablet TAKE 1 TAB BY MOUTH ONCE DAILY FOR DEPRESSION 30 tablet 11     levETIRAcetam (KEPPRA) 750 MG tablet Take 750 mg by  mouth 2 times daily       levothyroxine (SYNTHROID/LEVOTHROID) 50 MCG tablet Take 1 tablet (50 mcg) by mouth daily 30 tablet 11     Menthol, Topical Analgesic, (ICY HOT POWER) 16 % GEL Externally apply topically daily as needed       tamsulosin (FLOMAX) 0.4 MG capsule TAKE 1 CAP BY MOUTH ONCE DAILY FOR BPH 30 capsule 11         Case Management:  I have reviewed the care plan and MDS and do agree with the plan. Patient's desire to return to the community is present, but is not able due to care needs . Information reviewed:  Medications, vital signs, orders, and nursing notes.    ROS:  4 point ROS including Respiratory, CV, GI and , other than that noted in the HPI,  is negative    Vitals:  /57   Pulse 67   Temp 97.5  F (36.4  C)   Resp 18   Wt 68.5 kg (151 lb)   SpO2 95%   BMI 22.96 kg/m    Body mass index is 22.96 kg/m .  Exam:  GENERAL APPEARANCE:  in no distress, appears healthy  ENT:  Mouth and posterior oropharynx normal, moist mucous membranes  RESP:  lungs clear to auscultation   CV:  regular rate and rhythm, no murmur, rub, or gallop, no edema  ABDOMEN:  bowel sounds normal  M/S:   Gait and station abnormal using walker  SKIN:  Inspection of skin and subcutaneous tissue baseline  NEURO:   Examination of sensation by touch normal  PSYCH:  oriented X 3    Lab/Diagnostic data:   Labs done in SNF are in [place] EPIC. Please refer to them using [employer]/Care Everywhere.    ASSESSMENT/PLAN  Subdural hematoma (H)  History of CVA (cerebrovascular accident)  Chronic atrial fibrillation (H)  -warfarin on hold per neurology as hematoma is slowly resolving  -continue ASA81 for now  -follow up neurology on 7/20  -discontinue INR's  -plan to recheck Hgb, BMP in 2-3 months        Electronically signed by:  REBECCA Pisano CNP

## 2021-07-26 ENCOUNTER — ASSISTED LIVING VISIT (OUTPATIENT)
Dept: GERIATRICS | Facility: CLINIC | Age: 86
End: 2021-07-26
Payer: COMMERCIAL

## 2021-07-26 VITALS
OXYGEN SATURATION: 98 % | BODY MASS INDEX: 22.96 KG/M2 | RESPIRATION RATE: 18 BRPM | HEART RATE: 67 BPM | DIASTOLIC BLOOD PRESSURE: 57 MMHG | WEIGHT: 151 LBS | TEMPERATURE: 97.5 F | SYSTOLIC BLOOD PRESSURE: 135 MMHG

## 2021-07-26 DIAGNOSIS — S06.9XAS COGNITIVE DEFICIT AS LATE EFFECT OF TRAUMATIC BRAIN INJURY (H): ICD-10-CM

## 2021-07-26 DIAGNOSIS — S06.5XAA SDH (SUBDURAL HEMATOMA) (H): Primary | ICD-10-CM

## 2021-07-26 DIAGNOSIS — R41.89 COGNITIVE DEFICIT AS LATE EFFECT OF TRAUMATIC BRAIN INJURY (H): ICD-10-CM

## 2021-07-26 DIAGNOSIS — I48.20 CHRONIC ATRIAL FIBRILLATION (H): ICD-10-CM

## 2021-07-26 NOTE — PROGRESS NOTES
[facility]ERIATRIC [facility]    Chief Complaint   Patient presents with     RECHECK     HPI:  Sandeep Tapia is a 86 year old  (10/27/1934), who is being seen today for an episodic care visit at: [facility] [[id number]]. Today's concern is:   1. SDH (subdural hematoma) (H)    2. Chronic atrial fibrillation (H)    3. 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   Patient did have recent fall, probable etiology for interval bleeding, performs ADL's without assist, no distress, RRR today.    Allergies, and PMH/PSH reviewed in EPIC today.  REVIEW OF SYSTEMS:  4 point ROS including Respiratory, CV, GI and , other than that noted in the HPI,  is negative    Objective:   /57   Pulse 67   Temp 97.5  F (36.4  C)   Resp 18   Wt 68.5 kg (151 lb)   SpO2 98%   BMI 22.96 kg/m    GENERAL APPEARANCE:  in no distress, appears healthy  ENT:  Mouth and posterior oropharynx normal, moist mucous membranes  RESP:  lungs clear to auscultation   CV:  regular rate and rhythm, no murmur, rub, or gallop, no edema  ABDOMEN:  bowel sounds normal  M/S:   Gait and station abnormal using walker  SKIN:  Inspection of skin and subcutaneous tissue baseline  NEURO:   Examination of sensation by touch normal  PSYCH:  oriented X 3, memory impaired     Labs done in SNF are in [facility]. Please refer to them using [employer]/Care Everywhere.    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

## 2021-08-09 ENCOUNTER — ASSISTED LIVING VISIT (OUTPATIENT)
Dept: GERIATRICS | Facility: CLINIC | Age: 86
End: 2021-08-09
Payer: COMMERCIAL

## 2021-08-09 VITALS
TEMPERATURE: 97.5 F | BODY MASS INDEX: 22.97 KG/M2 | RESPIRATION RATE: 18 BRPM | HEART RATE: 67 BPM | WEIGHT: 151.6 LBS | SYSTOLIC BLOOD PRESSURE: 135 MMHG | HEIGHT: 68 IN | DIASTOLIC BLOOD PRESSURE: 57 MMHG | OXYGEN SATURATION: 97 %

## 2021-08-09 DIAGNOSIS — S06.5XAA SDH (SUBDURAL HEMATOMA) (H): Primary | ICD-10-CM

## 2021-08-09 DIAGNOSIS — I48.20 CHRONIC ATRIAL FIBRILLATION (H): ICD-10-CM

## 2021-08-09 DIAGNOSIS — I69.952 FLACCID HEMIPLEGIA OF LEFT DOMINANT SIDE AS LATE EFFECT OF CEREBROVASCULAR DISEASE, UNSPECIFIED CEREBROVASCULAR DISEASE TYPE (H): ICD-10-CM

## 2021-08-09 ASSESSMENT — MIFFLIN-ST. JEOR: SCORE: 1342.15

## 2021-08-09 NOTE — PROGRESS NOTES
"[facility] GERIATRIC SERVICES    Chief Complaint   Patient presents with     RECHECK     HPI:  Sandeep Tapia is a 86 year old  (10/27/1934), who is being seen today for an episodic care visit at: [facility] ([facility]) [[id number]]. Today's concern is:   1. SDH (subdural hematoma) (H)    2. 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 Chronic atrial fibrillation (H)      Patient seen for follow up, also called daughter Brunilda RE: status and plan, patient has recurrent/not resolving SDH, last seen by neurology on 7/20 with MRI completed, thought was that DC'ing warfarin may help resolve, today pleasant, chronic L foot drop and was already evaluated by therapies, using walker now for safety, in Afib with IRR, no distress.    Allergies, and PMH/PSH reviewed in EPIC today.  REVIEW OF SYSTEMS:  4 point ROS including Respiratory, CV, GI and , other than that noted in the HPI,  is negative    Objective:   /57   Pulse 67   Temp 97.5  F (36.4  C)   Resp 18   Ht 1.727 m (5' 8\")   Wt 68.8 kg (151 lb 9.6 oz)   SpO2 97%   BMI 23.05 kg/m    GENERAL APPEARANCE:  in no distress, appears healthy  ENT:  Mouth and posterior oropharynx normal, moist mucous membranes  RESP:  lungs clear to auscultation   CV:  no edema, irregular rhythm rate  ABDOMEN:  bowel sounds normal  M/S:   Gait and station abnormal using walker  SKIN:  Inspection of skin and subcutaneous tissue baseline  NEURO:   Examination of sensation by touch normal  PSYCH:  oriented X 3, memory impaired     Labs done in SNF are in [facility]. Please refer to them using [facility]/Care Everywhere.    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

## 2021-08-16 ENCOUNTER — TRANSFERRED RECORDS (OUTPATIENT)
Dept: HEALTH INFORMATION MANAGEMENT | Facility: CLINIC | Age: 86
End: 2021-08-16

## 2021-08-16 LAB
ANION GAP SERPL CALC-SCNC: 6 MMOL/L (ref 7–16)
BUN SERPL-MCNC: 19 MG/DL (ref 7–26)
CALCIUM (EXTERNAL): 8.7 MG/DL (ref 8.4–10.4)
CHLORIDE (EXTERNAL): 108 MMOL/L (ref 98–109)
CHOLESTEROL (EXTERNAL): 113 MG/DL (ref 0–199)
CO2 (EXTERNAL): 25 MMOL/L (ref 20–29)
CREATININE (EXTERNAL): 1.33 MG/DL (ref 0.73–1.18)
GFR ESTIMATED (EXTERNAL): 48 ML/MIN/1.73M2
GLUCOSE (EXTERNAL): 93 MG/DL (ref 70–100)
HDLC SERPL-MCNC: 30 MG/DL
HEMOGLOBIN (EXTERNAL): 9.6 G/DL (ref 13.5–17.5)
LDL CHOLESTEROL CALCULATED (EXTERNAL): 67 MG/DL
NON HDL CHOLESTEROL (EXTERNAL): 83 MG/DL
POTASSIUM (EXTERNAL): 4.4 MMOL/L (ref 3.5–5.1)
SODIUM (EXTERNAL): 139 MMOL/L (ref 136–145)
TRIGLYCERIDES (EXTERNAL): 79 MG/DL

## 2021-08-18 ENCOUNTER — ASSISTED LIVING VISIT (OUTPATIENT)
Dept: GERIATRICS | Facility: CLINIC | Age: 86
End: 2021-08-18
Payer: COMMERCIAL

## 2021-08-18 VITALS
DIASTOLIC BLOOD PRESSURE: 57 MMHG | RESPIRATION RATE: 18 BRPM | HEIGHT: 68 IN | BODY MASS INDEX: 22.97 KG/M2 | HEART RATE: 67 BPM | SYSTOLIC BLOOD PRESSURE: 135 MMHG | TEMPERATURE: 97.5 F | OXYGEN SATURATION: 96 % | WEIGHT: 151.6 LBS

## 2021-08-18 DIAGNOSIS — D64.9 ANEMIA, UNSPECIFIED TYPE: ICD-10-CM

## 2021-08-18 DIAGNOSIS — Z86.73 HISTORY OF CVA (CEREBROVASCULAR ACCIDENT): ICD-10-CM

## 2021-08-18 DIAGNOSIS — S06.5XAA SDH (SUBDURAL HEMATOMA) (H): Primary | ICD-10-CM

## 2021-08-18 DIAGNOSIS — N18.30 STAGE 3 CHRONIC KIDNEY DISEASE, UNSPECIFIED WHETHER STAGE 3A OR 3B CKD (H): ICD-10-CM

## 2021-08-18 DIAGNOSIS — I69.952 FLACCID HEMIPLEGIA OF LEFT DOMINANT SIDE AS LATE EFFECT OF CEREBROVASCULAR DISEASE, UNSPECIFIED CEREBROVASCULAR DISEASE TYPE (H): ICD-10-CM

## 2021-08-18 RX ORDER — ATORVASTATIN CALCIUM 20 MG/1
10 TABLET, FILM COATED ORAL DAILY
Qty: 30 TABLET | Refills: 11
Start: 2021-08-18 | End: 2021-10-21

## 2021-08-18 ASSESSMENT — MIFFLIN-ST. JEOR: SCORE: 1342.15

## 2021-08-18 NOTE — PROGRESS NOTES
"[facility] GERIATRIC SERVICES    Chief Complaint   Patient presents with     RECHECK     HPI:  Sandeep Tapia is a 86 year old  (10/27/1934), who is being seen today for an episodic care visit at: [facility] ([facility]) [[id number]]. Today's concern is:   1. SDH (subdural hematoma) (H)    2. 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 Anemia, unspecified type    4. Stage 3 chronic kidney disease, unspecified whether stage 3a or 3b CKD    5. History of CVA (cerebrovascular accident)      Patient seen for follow up, labs 8/16 Hgb 9.6, GFR 48, Tchol 113, still being followed by neurology in regards to non-resolving hematoma, was to be seen again yesterday but missed appt for CT, changed warfarin to ASA in effort to have resolved, ambulation with L foot drag, using walker, patient inquires if [facility] can follow if patient starts medication delivery from [facility] and self administers, instructed that he would need to find new PCP, plans to think about it.     Allergies, and PMH/PSH reviewed in [employer] today.  REVIEW OF SYSTEMS:  4 point ROS including Respiratory, CV, GI and , other than that noted in the HPI,  is negative    Objective:   /57   Pulse 67   Temp 97.5  F (36.4  C)   Resp 18   Ht 1.727 m (5' 8\")   Wt 68.8 kg (151 lb 9.6 oz)   SpO2 96%   BMI 23.05 kg/m    GENERAL APPEARANCE:  in no distress, appears healthy  ENT:  Mouth and posterior oropharynx normal, moist mucous membranes  RESP:  lungs clear to auscultation   CV:  no edema, rate-normal  ABDOMEN:  bowel sounds normal  M/S:   Gait and station abnormal L foot drop/drag  SKIN:  Inspection of skin and subcutaneous tissue baseline  NEURO:   Examination of sensation by touch normal  PSYCH:  oriented X 3    Labs done in SNF are in [facility]. Please refer to them using [employer]/Care Everywhere.    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

## 2021-09-14 ENCOUNTER — ASSISTED LIVING VISIT (OUTPATIENT)
Dept: GERIATRICS | Facility: CLINIC | Age: 86
End: 2021-09-14
Payer: COMMERCIAL

## 2021-09-14 VITALS
BODY MASS INDEX: 22.28 KG/M2 | RESPIRATION RATE: 18 BRPM | SYSTOLIC BLOOD PRESSURE: 135 MMHG | HEIGHT: 68 IN | OXYGEN SATURATION: 68 % | DIASTOLIC BLOOD PRESSURE: 57 MMHG | WEIGHT: 147 LBS | HEART RATE: 67 BPM | TEMPERATURE: 96.6 F

## 2021-09-14 DIAGNOSIS — S06.5XAA SDH (SUBDURAL HEMATOMA) (H): ICD-10-CM

## 2021-09-14 DIAGNOSIS — R41.89 COGNITIVE DEFICIT AS LATE EFFECT OF TRAUMATIC BRAIN INJURY (H): Primary | ICD-10-CM

## 2021-09-14 DIAGNOSIS — D64.9 ANEMIA, UNSPECIFIED TYPE: ICD-10-CM

## 2021-09-14 DIAGNOSIS — S06.9XAS COGNITIVE DEFICIT AS LATE EFFECT OF TRAUMATIC BRAIN INJURY (H): Primary | ICD-10-CM

## 2021-09-14 ASSESSMENT — MIFFLIN-ST. JEOR: SCORE: 1321.29

## 2021-09-14 NOTE — PROGRESS NOTES
[facility] GERIATRIC SERVICES  Chief Complaint   Patient presents with     RECHECK     Regulatory     [place] Medical Record Number:  [id number]  Place of Service where encounter took place:  [facility] ([facility] [[id number]]    HPI:    Sandeep Tapia  is 86 year old (10/27/1934), who is being seen today for a federally mandated E/M visit. 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    ALLERGIES:Patient has no known allergies.  PAST MEDICAL HISTORY: History reviewed. No pertinent past medical history.  PAST SURGICAL HISTORY:   has no past surgical history on file.  FAMILY HISTORY: family history is not on file.  SOCIAL HISTORY:  reports that he has quit smoking. He has never used smokeless tobacco. He reports previous alcohol use.    MEDICATIONS:     Review of your medicines          Accurate as of September 14, 2021  3:00 PM. If you have any questions, ask your nurse or doctor.            CONTINUE these medicines which have NOT CHANGED      Dose / Directions   acetaminophen 500 MG tablet  Commonly known as: TYLENOL  Used for: Recurrent falls      Dose: 1,000 mg  Take 2 tablets (1,000 mg) by mouth every 8 hours as needed for mild pain  Quantity: 60 tablet  Refills: 11     aspirin 81 MG chewable tablet  Commonly known as: ASA  Used for: Chronic atrial fibrillation (H)      Dose: 81 mg  Take 1 tablet (81 mg) by mouth daily  Quantity: 30 tablet  Refills: 0     atorvastatin 20 MG tablet  Commonly known as: LIPITOR  Used  "for: History of CVA (cerebrovascular accident)      Dose: 10 mg  Take 0.5 tablets (10 mg) by mouth daily  Quantity: 30 tablet  Refills: 11     CARBOXYMETHYLCELLULOSE SODIUM OP      Dose: 1 drop  Place 1 drop into both eyes every 6 hours as needed  Refills: 0     citalopram 20 MG tablet  Commonly known as: celeXA  Used for: Mild episode of recurrent major depressive disorder (H)      TAKE 1 TAB BY MOUTH ONCE DAILY FOR DEPRESSION  Quantity: 30 tablet  Refills: 11     [employer] 16 % Gel  Generic drug: Menthol (Topical Analgesic)      Externally apply topically daily as needed  Refills: 0     levETIRAcetam 750 MG tablet  Commonly known as: KEPPRA      Dose: 750 mg  Take 750 mg by mouth 2 times daily  Refills: 0     levothyroxine 50 MCG tablet  Commonly known as: SYNTHROID/LEVOTHROID  Used for: Hypothyroidism, unspecified type      Dose: 50 mcg  Take 1 tablet (50 mcg) by mouth daily  Quantity: 30 tablet  Refills: 11     tamsulosin 0.4 MG capsule  Commonly known as: FLOMAX  Used for: Benign prostatic hyperplasia without lower urinary tract symptoms      TAKE 1 CAP BY MOUTH ONCE DAILY FOR BPH  Quantity: 30 capsule  Refills: 11           Case Management:  I have reviewed the care plan and MDS and do agree with the plan. Patient's desire to return to the community is present, but is not able due to care needs . Information reviewed:  Medications, vital signs, orders, and nursing notes.    ROS:  4 point ROS including Respiratory, CV, GI and , other than that noted in the HPI,  is negative    Vitals:  /57   Pulse 67   Temp (!) 96.6  F (35.9  C)   Resp 18   Ht 1.727 m (5' 8\")   Wt 66.7 kg (147 lb)   SpO2 (!) 68%   BMI 22.35 kg/m    Body mass index is 22.35 kg/m .  Exam:  GENERAL APPEARANCE:  in no distress, appears healthy  ENT:  Mouth and posterior oropharynx normal, moist mucous membranes  RESP:  lungs clear to auscultation   CV:  regular rate and rhythm, no murmur, rub, or gallop, no edema  ABDOMEN:  bowel " sounds normal  M/S:   Gait and station abnormal using walker  SKIN:  Inspection of skin and subcutaneous tissue baseline  NEURO:   Examination of sensation by touch normal  PSYCH:  oriented X 3    Lab/Diagnostic data:   Labs done in SNF are in [place] EPIC. Please refer to them using [employer]/Care Everywhere.    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

## 2021-10-21 DIAGNOSIS — Z86.73 HISTORY OF CVA (CEREBROVASCULAR ACCIDENT): ICD-10-CM

## 2021-10-21 RX ORDER — ATORVASTATIN CALCIUM 20 MG/1
10 TABLET, FILM COATED ORAL DAILY
Qty: 30 TABLET | Refills: 11 | Status: SHIPPED | OUTPATIENT
Start: 2021-10-21 | End: 2022-01-01

## 2021-11-10 ENCOUNTER — ASSISTED LIVING VISIT (OUTPATIENT)
Dept: GERIATRICS | Facility: CLINIC | Age: 86
End: 2021-11-10
Payer: COMMERCIAL

## 2021-11-10 VITALS
HEIGHT: 68 IN | SYSTOLIC BLOOD PRESSURE: 135 MMHG | HEART RATE: 112 BPM | TEMPERATURE: 98.1 F | OXYGEN SATURATION: 98 % | RESPIRATION RATE: 18 BRPM | BODY MASS INDEX: 22.73 KG/M2 | DIASTOLIC BLOOD PRESSURE: 64 MMHG | WEIGHT: 150 LBS

## 2021-11-10 DIAGNOSIS — S06.5XAA SDH (SUBDURAL HEMATOMA) (H): Primary | ICD-10-CM

## 2021-11-10 DIAGNOSIS — D64.9 ANEMIA, UNSPECIFIED TYPE: ICD-10-CM

## 2021-11-10 DIAGNOSIS — I48.20 CHRONIC ATRIAL FIBRILLATION (H): ICD-10-CM

## 2021-11-10 ASSESSMENT — MIFFLIN-ST. JEOR: SCORE: 1329.9

## 2021-11-10 NOTE — PROGRESS NOTES
[facility] GERIATRIC SERVICES  Chief Complaint   Patient presents with     AL Regulatory Visit     [place] Medical Record Number:  [id number]  Place of Service where encounter took place:  [facility] ([facility]) [[id number]]    HPI:    Sandeep Tapia  is 87 year old (10/27/1934), who is being seen today for a federally mandated E/M visit. Today's concerns are:     SDH (subdural hematoma) (H)  Chronic atrial fibrillation (H)  Anemia, unspecified type     Patient seen today in [place] apartment, no apparent s/s depression after spouse , fairly independent with ADL's, appears slightly weak and pale today, recent labs generally WNL with Hgb trend <10, still being seen by neurology for SDH and has not completely resolved even with discontinue of warfarin, denies CP/palpitations, IRR today, no distress.    ALLERGIES:Patient has no known allergies.  PAST MEDICAL HISTORY: History reviewed. No pertinent past medical history.  PAST SURGICAL HISTORY:   has no past surgical history on file.  FAMILY HISTORY: family history is not on file.  SOCIAL HISTORY:  reports that he has quit smoking. He has never used smokeless tobacco. He reports previous alcohol use.    MEDICATIONS:     Review of your medicines          Accurate as of November 10, 2021  4:24 PM. If you have any questions, ask your nurse or doctor.            CONTINUE these medicines which have NOT CHANGED      Dose / Directions   acetaminophen 500 MG tablet  Commonly known as: TYLENOL  Used for: Recurrent falls      Dose: 1,000 mg  Take 2 tablets (1,000 mg) by mouth every 8 hours as needed for mild pain  Quantity: 60 tablet  Refills: 11     aspirin 81 MG chewable tablet  Commonly known as: ASA  Used for: Chronic atrial fibrillation (H)      Dose: 81 mg  Take 1 tablet (81 mg) by mouth daily  Quantity: 30 tablet  Refills: 0     atorvastatin 20 MG tablet  Commonly known as: LIPITOR  Used for: History of CVA (cerebrovascular accident)      Dose: 10 mg  Take  "0.5 tablets (10 mg) by mouth daily  Quantity: 30 tablet  Refills: 11     CARBOXYMETHYLCELLULOSE SODIUM OP      Dose: 1 drop  Place 1 drop into both eyes every 6 hours as needed  Refills: 0     citalopram 20 MG tablet  Commonly known as: celeXA  Used for: Mild episode of recurrent major depressive disorder (H)      TAKE 1 TAB BY MOUTH ONCE DAILY FOR DEPRESSION  Quantity: 30 tablet  Refills: 11     Icy Hot Power 16 % Gel  Generic drug: Menthol (Topical Analgesic)      Externally apply topically daily as needed  Refills: 0     levETIRAcetam 750 MG tablet  Commonly known as: KEPPRA      Dose: 750 mg  Take 750 mg by mouth 2 times daily  Refills: 0     levothyroxine 50 MCG tablet  Commonly known as: SYNTHROID/LEVOTHROID  Used for: Hypothyroidism, unspecified type      Dose: 50 mcg  Take 1 tablet (50 mcg) by mouth daily  Quantity: 30 tablet  Refills: 11     tamsulosin 0.4 MG capsule  Commonly known as: FLOMAX  Used for: Benign prostatic hyperplasia without lower urinary tract symptoms      TAKE 1 CAP BY MOUTH ONCE DAILY FOR BPH  Quantity: 30 capsule  Refills: 11           Case Management:  I have reviewed the care plan and MDS and do agree with the plan. Patient's desire to return to the community is present, but is not able due to care needs . Information reviewed:  Medications, vital signs, orders, and nursing notes.    ROS:  4 point ROS including Respiratory, CV, GI and , other than that noted in the HPI,  is negative    Vitals:  /64   Pulse 112   Temp 98.1  F (36.7  C)   Resp 18   Ht 1.727 m (5' 8\")   Wt 68 kg (150 lb)   SpO2 98%   BMI 22.81 kg/m    Body mass index is 22.81 kg/m .  Exam:  GENERAL APPEARANCE:  in no distress, appears healthy  ENT:  Mouth and posterior oropharynx normal, moist mucous membranes  RESP:  lungs clear to auscultation   CV:  no edema, irregular rhythm rate  ABDOMEN:  bowel sounds normal  M/S:   Gait and station abnormal using walker  SKIN:  Inspection of skin and subcutaneous " tissue baseline  NEURO:   Examination of sensation by touch normal  PSYCH:  oriented X 3    Lab/Diagnostic data:   Labs done in SNF are [facility]. Please refer to them using [email or web address]/Care Everywhere.    ASSESSMENT/PLAN  (S06.5X9A) SDH (subdural hematoma) (H)  (primary encounter diagnosis)  (I48.20) Chronic atrial fibrillation (H)  (D64.9) Anemia, unspecified type  Comment: recent Hgb 9.6, no apparent s/s SDH, IRR without CP/palpitations  Plan:   -follow up neurology Q2-3 months with CT  -continue ASA, no warfarin restart indicated  -check BP x3 days for weakness  -Hgb, TSH, BMP on 11/15 for anemia, CKD, hypothyroid  -will follow up with patient after lab results return        Electronically signed by:  REBECCA Pisano CNP

## 2021-11-15 ENCOUNTER — TRANSFERRED RECORDS (OUTPATIENT)
Dept: HEALTH INFORMATION MANAGEMENT | Facility: CLINIC | Age: 86
End: 2021-11-15
Payer: COMMERCIAL

## 2021-11-15 LAB
ANION GAP SERPL CALC-SCNC: 7 MMOL/L (ref 7–16)
BUN SERPL-MCNC: 25 MG/DL (ref 7–26)
CALCIUM (EXTERNAL): 9 MG/DL (ref 8.4–10.4)
CHLORIDE (EXTERNAL): 107 MMOL/L (ref 98–109)
CO2 (EXTERNAL): 26 MMOL/L (ref 20–29)
CREATININE (EXTERNAL): 1.36 MG/DL (ref 0.73–1.18)
GFR ESTIMATED (EXTERNAL): 46 ML/MIN/1.73M2
GLUCOSE (EXTERNAL): 143 MG/DL (ref 70–100)
HEMOGLOBIN (EXTERNAL): 10 G/DL (ref 13.5–17.5)
POTASSIUM (EXTERNAL): 4.3 MMOL/L (ref 3.5–5.1)
SODIUM (EXTERNAL): 140 MMOL/L (ref 136–145)
TSH SERPL-ACNC: 1.93 UIU/ML (ref 0.3–4.5)

## 2021-11-17 ENCOUNTER — ASSISTED LIVING VISIT (OUTPATIENT)
Dept: GERIATRICS | Facility: CLINIC | Age: 86
End: 2021-11-17
Payer: COMMERCIAL

## 2021-11-17 VITALS
WEIGHT: 149.5 LBS | DIASTOLIC BLOOD PRESSURE: 48 MMHG | HEART RATE: 60 BPM | RESPIRATION RATE: 20 BRPM | TEMPERATURE: 97 F | HEIGHT: 68 IN | BODY MASS INDEX: 22.66 KG/M2 | OXYGEN SATURATION: 99 % | SYSTOLIC BLOOD PRESSURE: 122 MMHG

## 2021-11-17 DIAGNOSIS — N18.30 STAGE 3 CHRONIC KIDNEY DISEASE, UNSPECIFIED WHETHER STAGE 3A OR 3B CKD (H): Primary | ICD-10-CM

## 2021-11-17 DIAGNOSIS — D64.9 ANEMIA, UNSPECIFIED TYPE: ICD-10-CM

## 2021-11-17 DIAGNOSIS — I48.20 CHRONIC ATRIAL FIBRILLATION (H): ICD-10-CM

## 2021-11-17 ASSESSMENT — MIFFLIN-ST. JEOR: SCORE: 1327.63

## 2021-11-17 NOTE — PROGRESS NOTES
"[facility] GERIATRIC SERVICES    Chief Complaint   Patient presents with     RECHECK     HPI:  Sandeep Tapia is a 87 year old  (10/27/1934), who is being seen today for an episodic care visit at: [facility] ([facility]) [[id number]]. Today's concern is:   1. Stage 3 chronic kidney disease, unspecified whether stage 3a or 3b CKD (H)    2. Anemia, unspecified type    3. Chronic atrial fibrillation (H)      Patient seen today for status follow up, creat 1.36, GFR 46, Hgb 10.0, asymptomatic, reports feeling well, ambulatory, I&O adequate, fairly independent with ADL's, RRR with no s/s CP/palpitations, no distress.    Allergies, and PMH/PSH reviewed in EPIC today.  REVIEW OF SYSTEMS:  4 point ROS including Respiratory, CV, GI and , other than that noted in the HPI,  is negative    Objective:   /48   Pulse 60   Temp 97  F (36.1  C)   Resp 20   Ht 1.727 m (5' 8\")   Wt 67.8 kg (149 lb 8 oz)   SpO2 99%   BMI 22.73 kg/m    GENERAL APPEARANCE:  Alert, in no distress, appears healthy  ENT:  Mouth and posterior oropharynx normal, moist mucous membranes  RESP:  lungs clear to auscultation   CV:  no edema, rate-normal  ABDOMEN:  bowel sounds normal  M/S:   Gait and station abnormal using walker  SKIN:  Inspection of skin and subcutaneous tissue baseline  NEURO:   Examination of sensation by touch normal  PSYCH:  oriented X 3    Labs done in SNF are in [facility]. Please refer to them using [facility]/Care Everywhere.    Assessment/Plan:  (N18.30) Stage 3 chronic kidney disease, unspecified whether stage 3a or 3b CKD (H)  (primary encounter diagnosis)  Comment: labs 11/15 creat 1.36, GFR 46  Plan: dose medications renally as possible  -repeat BMP in 6-12 months  -encouraged fluid intake    (D64.9) Anemia, unspecified type  Comment: Hgb on 11/15 was 10.0, previously 9.8 and 9.9  Plan: current range WNL, asymptomatic  -consider hematology workup in future  -plan to repeat Hgb in 6-12 months    (I48.20) " Chronic atrial fibrillation (H)  Comment: RRR, no chest pain today  Plan: continue ASA81, atorvastatin 10  -follow up cardiology PRN        Electronically signed by: REBECCA Pisano CNP

## 2021-12-15 ENCOUNTER — ASSISTED LIVING VISIT (OUTPATIENT)
Dept: GERIATRICS | Facility: CLINIC | Age: 86
End: 2021-12-15
Payer: COMMERCIAL

## 2021-12-15 VITALS
SYSTOLIC BLOOD PRESSURE: 122 MMHG | BODY MASS INDEX: 22.66 KG/M2 | OXYGEN SATURATION: 95 % | HEART RATE: 60 BPM | WEIGHT: 149.5 LBS | TEMPERATURE: 96.8 F | RESPIRATION RATE: 20 BRPM | HEIGHT: 68 IN | DIASTOLIC BLOOD PRESSURE: 48 MMHG

## 2021-12-15 DIAGNOSIS — I48.20 CHRONIC ATRIAL FIBRILLATION (H): ICD-10-CM

## 2021-12-15 DIAGNOSIS — S06.5XAA SDH (SUBDURAL HEMATOMA) (H): Primary | ICD-10-CM

## 2021-12-15 DIAGNOSIS — Z86.73 HISTORY OF CVA (CEREBROVASCULAR ACCIDENT): ICD-10-CM

## 2021-12-15 ASSESSMENT — MIFFLIN-ST. JEOR: SCORE: 1327.63

## 2021-12-15 NOTE — PROGRESS NOTES
"[facility] GERIATRIC SERVICES    Chief Complaint   Patient presents with     RECHECK     HPI:  Sandeep Tapia is a 87 year old  (10/27/1934), who is being seen today for an episodic care visit at: [facility] [[id number]]. Today's concern is:   1. SDH (subdural hematoma) (H)    2. Chronic atrial fibrillation (H)    3. History of CVA (cerebrovascular accident)      Per [facility] excerpt from neurology on 12/14;  Assessment: Sandeep Tapia is a 87 y.o. male with right frontal IPH and SDH here for follow-up, HCT today shows resolution of hemorrhage, no concerning s/s or exam findings, recommend patient to discuss with PCP and is safe to resume anticoagulants, but recommended he discuss with prescribing provider for plan.    Patient today seen for follow up on plan, also contacted and left message with daughter Ellen RE: status and plan, patient has been stable with ASA81 since stopped earlier this year in effort to have SDH resolve which it now has, question is whether or not to change the ASA back over to warfarin along with INR checks, patient understands warfarin may reduce risk slightly but would prefer to continue with ASA, has had no s/s SDH, Afib nor CVA since changing to ASA, patient also requesting to start drinking vodka again but understands family would not be in support of this, overall status WNL, ambulatory, no distress.      Allergies, and PMH/PSH reviewed in EPIC today.  REVIEW OF SYSTEMS:  4 point ROS including Respiratory, CV, GI and , other than that noted in the HPI,  is negative    Objective:   /48   Pulse 60   Temp 96.8  F (36  C)   Resp 20   Ht 1.727 m (5' 8\")   Wt 67.8 kg (149 lb 8 oz)   SpO2 95%   BMI 22.73 kg/m    GENERAL APPEARANCE:  in no distress, appears healthy  ENT:  Mouth and posterior oropharynx normal, moist mucous membranes  RESP:  lungs clear to auscultation   CV:  no edema  ABDOMEN:  bowel sounds normal  M/S:   Gait and station abnormal using " walker  SKIN:  Inspection of skin and subcutaneous tissue baseline  NEURO:   Examination of sensation by touch normal  PSYCH:  oriented X 3    Labs done in SNF are in [place] EPIC. Please refer to them using EPIC/Care Everywhere.    Assessment/Plan:  (S06.5X9A) SDH (subdural hematoma) (H)  (primary encounter diagnosis)  (I48.20) Chronic atrial fibrillation (H)  (Z86.73) History of CVA (cerebrovascular accident)  Comment: neurology appt 12/14 hematoma resolved, no recent s/s CVA nor bleeding  Plan:   -patient would prefer to not restart warfarin and have INR checks  -continue AMG99xs daily  -left message with daughter RE: plan, to contact facility if would prefer to change  -OK with vodka intake per patient request, needs to bring up with family  -plan to follow up in 1-2 months RE: status and plan        Electronically signed by: REBECCA Pisano CNP

## 2021-12-29 ENCOUNTER — ASSISTED LIVING VISIT (OUTPATIENT)
Dept: GERIATRICS | Facility: CLINIC | Age: 86
End: 2021-12-29
Payer: COMMERCIAL

## 2021-12-29 VITALS
HEART RATE: 84 BPM | DIASTOLIC BLOOD PRESSURE: 51 MMHG | OXYGEN SATURATION: 99 % | SYSTOLIC BLOOD PRESSURE: 117 MMHG | TEMPERATURE: 96.6 F | RESPIRATION RATE: 18 BRPM | HEIGHT: 68 IN | WEIGHT: 147 LBS | BODY MASS INDEX: 22.28 KG/M2

## 2021-12-29 DIAGNOSIS — S01.01XD LACERATION OF SCALP, SUBSEQUENT ENCOUNTER: ICD-10-CM
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
## 2021-12-29 DIAGNOSIS — S06.5XAA SDH (SUBDURAL HEMATOMA) (H): Primary | ICD-10-CM

## 2021-12-29 ASSESSMENT — MIFFLIN-ST. JEOR: SCORE: 1316.29

## 2021-12-29 NOTE — PROGRESS NOTES
"[facility] GERIATRIC SERVICES    Chief Complaint   Patient presents with     RECHECK     HPI:  Sandeep Tapia is a 87 year old  (10/27/1934), who is being seen today for an episodic care visit at: [facility] ([facility]) [[id number]]. Today's concern is:   1. SDH (subdural hematoma) (H)    2. Fall, subsequent encounter    3. Laceration of scalp, subsequent encounter      Patient seen today for follow up, on 12/25 had fall, no recall of why or when, after falling got up and went to bed, woke up next morning as normal, family member visiting on 12/26 noticed blood on back scalp, subsequently sent via EMS to [facility], CT's head and spine WNL HEAD NO IV CONTRAST   Impression:   1. No acute intracranial pathology.   2. Soft tissue swelling of the left posterior scalp without underlying acute fracture.   3. 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   Had laceration R occiput 5cm that was stapled, today pleasant, limited recall of accident, previous SDH that was fully resolved per neuro approx one month ago that appears to not have exacerbated, on ASA, hair matted slightly with blood, no apparent laceration discharge, no distress.    Allergies, and PMH/PSH reviewed in [facility] today.  REVIEW OF SYSTEMS:  4 point ROS including Respiratory, CV, GI and , other than that noted in the HPI,  is negative    Objective:   /51   Pulse 84   Temp (!) 96.6  F (35.9  C)   Resp 18   Ht 1.727 m (5' 8\")   Wt 66.7 kg (147 lb)   SpO2 99%   BMI 22.35 kg/m    GENERAL APPEARANCE:  in no distress, appears healthy  ENT:  Mouth and posterior oropharynx normal, moist mucous membranes  RESP:  lungs clear to auscultation   CV:  no edema  ABDOMEN:  bowel sounds normal  M/S:   Gait and station abnormal using walker  SKIN:  Inspection of skin and subcutaneous tissueas in HPI  NEURO:   Examination of sensation by touch normal  PSYCH:  oriented X 3    Labs done in SNF are in [place] [facility]. Please refer to them using " EPIC/Care Everywhere.    Assessment/Plan:  (S06.5X9A) SDH (subdural hematoma) (H)  (primary encounter diagnosis)  (W19.XXXD) Fall, subsequent encounter  (S01.01XD) Laceration of scalp, subsequent encounter  Comment: no exacerbation of recently resolved SDH, no recall of fall, laceration stapled  Plan:   -cleanse scalp area with laceration  -remove staples on 1/3  -PT/OT eval and treat  -continue ASA81  -APAP PRN for pain  -plan to follow up in 1-2 weeks        Electronically signed by: REBECCA Pisano CNP         Documentation of Face-to-Face and Certification for Home Health Services     Patient: Sandeep Tapia   YOB: 1934  MR Number: [id number]  Today's Date: 12/29/2021    I certify that patient: Sandeep Tapia is under my care and that I, or a nurse practitioner or physician's assistant working with me, had a face-to-face encounter that meets the physician face-to-face encounter requirements with this patient on: 12/29/2021.    This encounter with the patient was in whole, or in part, for the following medical condition, which is the primary reason for home health care:   1. SDH (subdural hematoma) (H)    2. Fall, subsequent encounter    3. Laceration of scalp, subsequent encounter        I certify that, based on my findings, the following services are medically necessary home health services: Occupational Therapy and Physical Therapy.    My clinical findings support the need for the above services because: Occupational Therapy Services are needed to assess and treat cognitive ability and address ADL safety due to impairment in transfers, DME eval. and Physical Therapy Services are needed to assess and treat the following functional impairments: weakness, altered gait.    Further, I certify that my clinical findings support that this patient is homebound (i.e. absences from home require considerable and taxing effort and are for medical reasons or [employer] services or infrequently or  of short duration when for other reasons) because: Requires assistance of another person or specialized equipment to access medical services because patient: Is unable to operate assistive equipment on their own...    Based on the above findings. I certify that this patient is confined to the home and needs intermittent skilled nursing care, physical therapy and/or speech therapy.  The patient is under my care, and I have initiated the establishment of the plan of care.  This patient will be followed by a physician who will periodically review the plan of care.  Physician/Provider to provide follow up care: Kirby Fuentes    Responsible Medicare certified PECOS Physician: Dr.Dan Ruiz  Electronic Physician Signature  Date: 12/29/2021

## 2022-01-01 ENCOUNTER — TRANSITIONAL CARE UNIT VISIT (OUTPATIENT)
Dept: GERIATRICS | Facility: CLINIC | Age: 87
End: 2022-01-01
Payer: COMMERCIAL

## 2022-01-01 ENCOUNTER — ASSISTED LIVING VISIT (OUTPATIENT)
Dept: GERIATRICS | Facility: CLINIC | Age: 87
End: 2022-01-01
Payer: COMMERCIAL

## 2022-01-01 ENCOUNTER — TELEPHONE (OUTPATIENT)
Dept: GERIATRICS | Facility: CLINIC | Age: 87
End: 2022-01-01

## 2022-01-01 ENCOUNTER — NURSING HOME VISIT (OUTPATIENT)
Dept: GERIATRICS | Facility: CLINIC | Age: 87
End: 2022-01-01
Payer: MEDICARE

## 2022-01-01 ENCOUNTER — NURSING HOME VISIT (OUTPATIENT)
Dept: GERIATRICS | Facility: CLINIC | Age: 87
End: 2022-01-01
Payer: COMMERCIAL

## 2022-01-01 ENCOUNTER — TRANSFERRED RECORDS (OUTPATIENT)
Dept: HEALTH INFORMATION MANAGEMENT | Facility: CLINIC | Age: 87
End: 2022-01-01

## 2022-01-01 ENCOUNTER — DISCHARGE SUMMARY NURSING HOME (OUTPATIENT)
Dept: GERIATRICS | Facility: CLINIC | Age: 87
End: 2022-01-01
Payer: COMMERCIAL

## 2022-01-01 VITALS
RESPIRATION RATE: 14 BRPM | OXYGEN SATURATION: 98 % | BODY MASS INDEX: 19.62 KG/M2 | SYSTOLIC BLOOD PRESSURE: 141 MMHG | DIASTOLIC BLOOD PRESSURE: 71 MMHG | WEIGHT: 125 LBS | HEART RATE: 64 BPM | TEMPERATURE: 97.5 F | HEIGHT: 67 IN

## 2022-01-01 VITALS
OXYGEN SATURATION: 98 % | BODY MASS INDEX: 21.19 KG/M2 | RESPIRATION RATE: 18 BRPM | WEIGHT: 139.8 LBS | DIASTOLIC BLOOD PRESSURE: 54 MMHG | SYSTOLIC BLOOD PRESSURE: 117 MMHG | HEART RATE: 64 BPM | TEMPERATURE: 96.6 F | HEIGHT: 68 IN

## 2022-01-01 VITALS
SYSTOLIC BLOOD PRESSURE: 117 MMHG | HEIGHT: 68 IN | TEMPERATURE: 97.3 F | OXYGEN SATURATION: 98 % | HEART RATE: 64 BPM | RESPIRATION RATE: 18 BRPM | WEIGHT: 139.8 LBS | BODY MASS INDEX: 21.19 KG/M2 | DIASTOLIC BLOOD PRESSURE: 54 MMHG

## 2022-01-01 VITALS
WEIGHT: 138.6 LBS | DIASTOLIC BLOOD PRESSURE: 62 MMHG | RESPIRATION RATE: 20 BRPM | HEART RATE: 62 BPM | BODY MASS INDEX: 21.01 KG/M2 | OXYGEN SATURATION: 97 % | TEMPERATURE: 97.5 F | SYSTOLIC BLOOD PRESSURE: 129 MMHG | HEIGHT: 68 IN

## 2022-01-01 VITALS
HEART RATE: 62 BPM | TEMPERATURE: 98 F | WEIGHT: 126.9 LBS | OXYGEN SATURATION: 100 % | HEIGHT: 67 IN | SYSTOLIC BLOOD PRESSURE: 124 MMHG | BODY MASS INDEX: 19.92 KG/M2 | DIASTOLIC BLOOD PRESSURE: 63 MMHG | RESPIRATION RATE: 20 BRPM

## 2022-01-01 VITALS
DIASTOLIC BLOOD PRESSURE: 62 MMHG | WEIGHT: 138.6 LBS | OXYGEN SATURATION: 98 % | HEART RATE: 62 BPM | TEMPERATURE: 96.8 F | HEIGHT: 68 IN | BODY MASS INDEX: 21.01 KG/M2 | SYSTOLIC BLOOD PRESSURE: 129 MMHG | RESPIRATION RATE: 20 BRPM

## 2022-01-01 VITALS
HEART RATE: 75 BPM | OXYGEN SATURATION: 99 % | SYSTOLIC BLOOD PRESSURE: 147 MMHG | RESPIRATION RATE: 16 BRPM | TEMPERATURE: 97.6 F | WEIGHT: 130.5 LBS | BODY MASS INDEX: 20.48 KG/M2 | HEIGHT: 67 IN | DIASTOLIC BLOOD PRESSURE: 71 MMHG

## 2022-01-01 VITALS
TEMPERATURE: 97.2 F | SYSTOLIC BLOOD PRESSURE: 135 MMHG | OXYGEN SATURATION: 98 % | HEART RATE: 58 BPM | WEIGHT: 143 LBS | BODY MASS INDEX: 21.67 KG/M2 | HEIGHT: 68 IN | RESPIRATION RATE: 18 BRPM | DIASTOLIC BLOOD PRESSURE: 55 MMHG

## 2022-01-01 VITALS
TEMPERATURE: 97.5 F | WEIGHT: 129 LBS | HEIGHT: 68 IN | SYSTOLIC BLOOD PRESSURE: 118 MMHG | HEART RATE: 68 BPM | RESPIRATION RATE: 18 BRPM | DIASTOLIC BLOOD PRESSURE: 54 MMHG | OXYGEN SATURATION: 96 % | BODY MASS INDEX: 19.55 KG/M2

## 2022-01-01 VITALS
OXYGEN SATURATION: 98 % | DIASTOLIC BLOOD PRESSURE: 62 MMHG | HEART RATE: 62 BPM | BODY MASS INDEX: 21.01 KG/M2 | TEMPERATURE: 97.5 F | WEIGHT: 138.6 LBS | SYSTOLIC BLOOD PRESSURE: 129 MMHG | RESPIRATION RATE: 20 BRPM | HEIGHT: 68 IN

## 2022-01-01 VITALS
HEART RATE: 70 BPM | SYSTOLIC BLOOD PRESSURE: 138 MMHG | RESPIRATION RATE: 18 BRPM | WEIGHT: 131 LBS | OXYGEN SATURATION: 95 % | TEMPERATURE: 97.7 F | DIASTOLIC BLOOD PRESSURE: 67 MMHG | BODY MASS INDEX: 20.52 KG/M2

## 2022-01-01 VITALS
OXYGEN SATURATION: 96 % | HEART RATE: 68 BPM | SYSTOLIC BLOOD PRESSURE: 98 MMHG | WEIGHT: 124 LBS | BODY MASS INDEX: 19.46 KG/M2 | RESPIRATION RATE: 18 BRPM | DIASTOLIC BLOOD PRESSURE: 62 MMHG | TEMPERATURE: 97.9 F | HEIGHT: 67 IN

## 2022-01-01 VITALS
HEIGHT: 67 IN | HEART RATE: 63 BPM | DIASTOLIC BLOOD PRESSURE: 64 MMHG | WEIGHT: 130.1 LBS | RESPIRATION RATE: 16 BRPM | BODY MASS INDEX: 20.42 KG/M2 | OXYGEN SATURATION: 96 % | TEMPERATURE: 97.8 F | SYSTOLIC BLOOD PRESSURE: 142 MMHG

## 2022-01-01 VITALS
WEIGHT: 130.5 LBS | HEART RATE: 60 BPM | SYSTOLIC BLOOD PRESSURE: 104 MMHG | RESPIRATION RATE: 16 BRPM | BODY MASS INDEX: 20.48 KG/M2 | HEIGHT: 67 IN | DIASTOLIC BLOOD PRESSURE: 60 MMHG | TEMPERATURE: 97.8 F | OXYGEN SATURATION: 99 %

## 2022-01-01 VITALS — TEMPERATURE: 98.2 F | OXYGEN SATURATION: 98 % | HEART RATE: 62 BPM

## 2022-01-01 VITALS
BODY MASS INDEX: 17.89 KG/M2 | WEIGHT: 114 LBS | OXYGEN SATURATION: 100 % | DIASTOLIC BLOOD PRESSURE: 67 MMHG | SYSTOLIC BLOOD PRESSURE: 128 MMHG | RESPIRATION RATE: 16 BRPM | HEIGHT: 67 IN | HEART RATE: 76 BPM | TEMPERATURE: 98.2 F

## 2022-01-01 VITALS
SYSTOLIC BLOOD PRESSURE: 135 MMHG | HEART RATE: 68 BPM | DIASTOLIC BLOOD PRESSURE: 55 MMHG | WEIGHT: 130.2 LBS | OXYGEN SATURATION: 98 % | RESPIRATION RATE: 20 BRPM | BODY MASS INDEX: 19.46 KG/M2 | OXYGEN SATURATION: 97 % | HEIGHT: 67 IN | RESPIRATION RATE: 18 BRPM | HEIGHT: 67 IN | TEMPERATURE: 97.8 F | WEIGHT: 124 LBS | DIASTOLIC BLOOD PRESSURE: 60 MMHG | BODY MASS INDEX: 20.44 KG/M2 | SYSTOLIC BLOOD PRESSURE: 95 MMHG | TEMPERATURE: 97.9 F | HEART RATE: 81 BPM

## 2022-01-01 VITALS
WEIGHT: 143 LBS | RESPIRATION RATE: 18 BRPM | SYSTOLIC BLOOD PRESSURE: 135 MMHG | OXYGEN SATURATION: 98 % | HEART RATE: 61 BPM | DIASTOLIC BLOOD PRESSURE: 55 MMHG | TEMPERATURE: 97.3 F | HEIGHT: 68 IN | BODY MASS INDEX: 21.67 KG/M2

## 2022-01-01 VITALS
HEART RATE: 64 BPM | WEIGHT: 139.8 LBS | HEIGHT: 68 IN | TEMPERATURE: 97 F | BODY MASS INDEX: 21.19 KG/M2 | DIASTOLIC BLOOD PRESSURE: 54 MMHG | SYSTOLIC BLOOD PRESSURE: 117 MMHG | OXYGEN SATURATION: 99 % | RESPIRATION RATE: 18 BRPM

## 2022-01-01 VITALS
OXYGEN SATURATION: 97 % | WEIGHT: 140.8 LBS | HEART RATE: 56 BPM | BODY MASS INDEX: 21.34 KG/M2 | RESPIRATION RATE: 18 BRPM | DIASTOLIC BLOOD PRESSURE: 70 MMHG | SYSTOLIC BLOOD PRESSURE: 130 MMHG | TEMPERATURE: 97.2 F | HEIGHT: 68 IN

## 2022-01-01 VITALS
DIASTOLIC BLOOD PRESSURE: 55 MMHG | SYSTOLIC BLOOD PRESSURE: 112 MMHG | WEIGHT: 129 LBS | RESPIRATION RATE: 16 BRPM | OXYGEN SATURATION: 98 % | HEART RATE: 61 BPM | HEIGHT: 67 IN | TEMPERATURE: 97.7 F | BODY MASS INDEX: 20.25 KG/M2

## 2022-01-01 VITALS
OXYGEN SATURATION: 96 % | DIASTOLIC BLOOD PRESSURE: 80 MMHG | HEIGHT: 67 IN | HEART RATE: 64 BPM | TEMPERATURE: 98.1 F | RESPIRATION RATE: 16 BRPM | BODY MASS INDEX: 19.46 KG/M2 | SYSTOLIC BLOOD PRESSURE: 132 MMHG | WEIGHT: 124 LBS

## 2022-01-01 VITALS
BODY MASS INDEX: 21.67 KG/M2 | DIASTOLIC BLOOD PRESSURE: 55 MMHG | WEIGHT: 143 LBS | RESPIRATION RATE: 18 BRPM | TEMPERATURE: 97.2 F | OXYGEN SATURATION: 93 % | SYSTOLIC BLOOD PRESSURE: 135 MMHG | HEIGHT: 68 IN | HEART RATE: 54 BPM

## 2022-01-01 VITALS
HEIGHT: 67 IN | DIASTOLIC BLOOD PRESSURE: 50 MMHG | WEIGHT: 130.1 LBS | SYSTOLIC BLOOD PRESSURE: 101 MMHG | OXYGEN SATURATION: 99 % | HEART RATE: 64 BPM | RESPIRATION RATE: 16 BRPM | TEMPERATURE: 98.3 F | BODY MASS INDEX: 20.42 KG/M2

## 2022-01-01 VITALS
OXYGEN SATURATION: 95 % | BODY MASS INDEX: 20.44 KG/M2 | RESPIRATION RATE: 18 BRPM | SYSTOLIC BLOOD PRESSURE: 121 MMHG | TEMPERATURE: 97.6 F | HEIGHT: 67 IN | HEART RATE: 59 BPM | DIASTOLIC BLOOD PRESSURE: 57 MMHG | WEIGHT: 130.2 LBS

## 2022-01-01 VITALS
SYSTOLIC BLOOD PRESSURE: 117 MMHG | TEMPERATURE: 97.7 F | BODY MASS INDEX: 20.25 KG/M2 | HEART RATE: 62 BPM | OXYGEN SATURATION: 97 % | RESPIRATION RATE: 16 BRPM | HEIGHT: 67 IN | WEIGHT: 129 LBS | DIASTOLIC BLOOD PRESSURE: 56 MMHG

## 2022-01-01 VITALS
TEMPERATURE: 97 F | OXYGEN SATURATION: 100 % | SYSTOLIC BLOOD PRESSURE: 129 MMHG | DIASTOLIC BLOOD PRESSURE: 62 MMHG | WEIGHT: 138.6 LBS | HEART RATE: 65 BPM | BODY MASS INDEX: 21.01 KG/M2 | RESPIRATION RATE: 20 BRPM | HEIGHT: 68 IN

## 2022-01-01 VITALS
HEIGHT: 68 IN | TEMPERATURE: 96.1 F | RESPIRATION RATE: 20 BRPM | HEART RATE: 65 BPM | SYSTOLIC BLOOD PRESSURE: 129 MMHG | WEIGHT: 138.6 LBS | OXYGEN SATURATION: 98 % | BODY MASS INDEX: 21.01 KG/M2 | DIASTOLIC BLOOD PRESSURE: 62 MMHG

## 2022-01-01 VITALS
BODY MASS INDEX: 21.01 KG/M2 | OXYGEN SATURATION: 99 % | HEART RATE: 56 BPM | WEIGHT: 140.8 LBS | SYSTOLIC BLOOD PRESSURE: 144 MMHG | BODY MASS INDEX: 21.34 KG/M2 | SYSTOLIC BLOOD PRESSURE: 144 MMHG | RESPIRATION RATE: 20 BRPM | HEIGHT: 68 IN | TEMPERATURE: 96.3 F | RESPIRATION RATE: 18 BRPM | DIASTOLIC BLOOD PRESSURE: 75 MMHG | WEIGHT: 138.6 LBS | TEMPERATURE: 97.2 F | DIASTOLIC BLOOD PRESSURE: 75 MMHG | HEART RATE: 56 BPM | HEIGHT: 68 IN | OXYGEN SATURATION: 98 %

## 2022-01-01 VITALS
TEMPERATURE: 97.8 F | HEART RATE: 61 BPM | SYSTOLIC BLOOD PRESSURE: 117 MMHG | WEIGHT: 142 LBS | OXYGEN SATURATION: 94 % | BODY MASS INDEX: 21.52 KG/M2 | RESPIRATION RATE: 18 BRPM | HEIGHT: 68 IN | DIASTOLIC BLOOD PRESSURE: 54 MMHG

## 2022-01-01 VITALS
RESPIRATION RATE: 18 BRPM | DIASTOLIC BLOOD PRESSURE: 56 MMHG | OXYGEN SATURATION: 97 % | HEIGHT: 67 IN | HEART RATE: 64 BPM | SYSTOLIC BLOOD PRESSURE: 112 MMHG | WEIGHT: 131 LBS | BODY MASS INDEX: 20.56 KG/M2 | TEMPERATURE: 97.7 F

## 2022-01-01 VITALS
BODY MASS INDEX: 20.48 KG/M2 | OXYGEN SATURATION: 98 % | WEIGHT: 130.5 LBS | SYSTOLIC BLOOD PRESSURE: 112 MMHG | HEIGHT: 67 IN | HEART RATE: 62 BPM | TEMPERATURE: 97.9 F | RESPIRATION RATE: 18 BRPM | DIASTOLIC BLOOD PRESSURE: 59 MMHG

## 2022-01-01 VITALS
WEIGHT: 135.2 LBS | HEIGHT: 68 IN | OXYGEN SATURATION: 98 % | BODY MASS INDEX: 20.49 KG/M2 | RESPIRATION RATE: 18 BRPM | TEMPERATURE: 97 F | DIASTOLIC BLOOD PRESSURE: 60 MMHG | HEART RATE: 64 BPM | SYSTOLIC BLOOD PRESSURE: 118 MMHG

## 2022-01-01 VITALS
TEMPERATURE: 97.2 F | DIASTOLIC BLOOD PRESSURE: 51 MMHG | WEIGHT: 129 LBS | RESPIRATION RATE: 16 BRPM | HEIGHT: 68 IN | BODY MASS INDEX: 19.55 KG/M2 | RESPIRATION RATE: 18 BRPM | SYSTOLIC BLOOD PRESSURE: 118 MMHG | OXYGEN SATURATION: 96 % | BODY MASS INDEX: 22.28 KG/M2 | HEART RATE: 84 BPM | WEIGHT: 147 LBS | SYSTOLIC BLOOD PRESSURE: 117 MMHG | OXYGEN SATURATION: 98 % | HEIGHT: 68 IN | TEMPERATURE: 97.6 F | HEART RATE: 59 BPM | DIASTOLIC BLOOD PRESSURE: 64 MMHG

## 2022-01-01 VITALS
DIASTOLIC BLOOD PRESSURE: 66 MMHG | SYSTOLIC BLOOD PRESSURE: 131 MMHG | TEMPERATURE: 97.7 F | RESPIRATION RATE: 18 BRPM | OXYGEN SATURATION: 99 % | HEART RATE: 89 BPM

## 2022-01-01 VITALS
BODY MASS INDEX: 19.55 KG/M2 | HEIGHT: 68 IN | RESPIRATION RATE: 18 BRPM | HEART RATE: 80 BPM | DIASTOLIC BLOOD PRESSURE: 54 MMHG | WEIGHT: 129 LBS | SYSTOLIC BLOOD PRESSURE: 101 MMHG | TEMPERATURE: 97.5 F | OXYGEN SATURATION: 98 %

## 2022-01-01 DIAGNOSIS — N40.0 BENIGN PROSTATIC HYPERPLASIA WITHOUT LOWER URINARY TRACT SYMPTOMS: ICD-10-CM

## 2022-01-01 DIAGNOSIS — N18.30 STAGE 3 CHRONIC KIDNEY DISEASE, UNSPECIFIED WHETHER STAGE 3A OR 3B CKD (H): ICD-10-CM

## 2022-01-01 DIAGNOSIS — Z95.0 CARDIAC PACEMAKER IN SITU: ICD-10-CM

## 2022-01-01 DIAGNOSIS — R29.6 RECURRENT FALLS: ICD-10-CM

## 2022-01-01 DIAGNOSIS — F03.90 DEMENTIA WITHOUT BEHAVIORAL DISTURBANCE (H): Primary | ICD-10-CM

## 2022-01-01 DIAGNOSIS — R42 DIZZINESS: ICD-10-CM

## 2022-01-01 DIAGNOSIS — S43.005D DISLOCATION OF LEFT SHOULDER JOINT, SUBSEQUENT ENCOUNTER: ICD-10-CM

## 2022-01-01 DIAGNOSIS — R15.9 INCONTINENCE OF FECES, UNSPECIFIED FECAL INCONTINENCE TYPE: ICD-10-CM

## 2022-01-01 DIAGNOSIS — I49.5 TACHY-BRADY SYNDROME (H): ICD-10-CM

## 2022-01-01 DIAGNOSIS — F03.90 DEMENTIA WITHOUT BEHAVIORAL DISTURBANCE (H): ICD-10-CM

## 2022-01-01 DIAGNOSIS — G81.91 RIGHT HEMIPLEGIA (H): ICD-10-CM

## 2022-01-01 DIAGNOSIS — S43.005A DISLOCATION OF LEFT SHOULDER JOINT, INITIAL ENCOUNTER: ICD-10-CM

## 2022-01-01 DIAGNOSIS — I49.5 TACHY-BRADY SYNDROME (H): Primary | ICD-10-CM

## 2022-01-01 DIAGNOSIS — E03.9 HYPOTHYROIDISM, UNSPECIFIED TYPE: ICD-10-CM

## 2022-01-01 DIAGNOSIS — G45.9 TIA (TRANSIENT ISCHEMIC ATTACK): ICD-10-CM

## 2022-01-01 DIAGNOSIS — N39.42 URINARY INCONTINENCE WITHOUT SENSORY AWARENESS: ICD-10-CM

## 2022-01-01 DIAGNOSIS — I48.91 ATRIAL FIBRILLATION WITH RVR (H): ICD-10-CM

## 2022-01-01 DIAGNOSIS — S01.312D: ICD-10-CM

## 2022-01-01 DIAGNOSIS — N18.30 STAGE 3 CHRONIC KIDNEY DISEASE, UNSPECIFIED WHETHER STAGE 3A OR 3B CKD (H): Primary | ICD-10-CM

## 2022-01-01 DIAGNOSIS — S01.311D: ICD-10-CM

## 2022-01-01 DIAGNOSIS — I63.9 CEREBROVASCULAR ACCIDENT (CVA), UNSPECIFIED MECHANISM (H): ICD-10-CM

## 2022-01-01 DIAGNOSIS — N39.0 URINARY TRACT INFECTION WITHOUT HEMATURIA, SITE UNSPECIFIED: ICD-10-CM

## 2022-01-01 DIAGNOSIS — E44.1 MILD PROTEIN-CALORIE MALNUTRITION (H): Primary | ICD-10-CM

## 2022-01-01 DIAGNOSIS — R33.9 URINE RETENTION: ICD-10-CM

## 2022-01-01 DIAGNOSIS — F33.0 MILD EPISODE OF RECURRENT MAJOR DEPRESSIVE DISORDER (H): ICD-10-CM

## 2022-01-01 DIAGNOSIS — R41.0 CONFUSION: ICD-10-CM

## 2022-01-01 DIAGNOSIS — E44.1 MILD PROTEIN-CALORIE MALNUTRITION (H): ICD-10-CM

## 2022-01-01 DIAGNOSIS — I10 HYPERTENSION, UNSPECIFIED TYPE: ICD-10-CM

## 2022-01-01 DIAGNOSIS — F03.90 DEMENTIA WITHOUT BEHAVIORAL DISTURBANCE, UNSPECIFIED DEMENTIA TYPE: ICD-10-CM

## 2022-01-01 DIAGNOSIS — M62.81 GENERALIZED MUSCLE WEAKNESS: ICD-10-CM

## 2022-01-01 DIAGNOSIS — I63.9 CEREBROVASCULAR ACCIDENT (CVA), UNSPECIFIED MECHANISM (H): Primary | ICD-10-CM

## 2022-01-01 DIAGNOSIS — Z71.89 ACP (ADVANCE CARE PLANNING): ICD-10-CM

## 2022-01-01 DIAGNOSIS — I69.952 FLACCID HEMIPLEGIA OF LEFT DOMINANT SIDE AS LATE EFFECT OF CEREBROVASCULAR DISEASE, UNSPECIFIED CEREBROVASCULAR DISEASE TYPE (H): Primary | ICD-10-CM

## 2022-01-01 DIAGNOSIS — I48.91 ATRIAL FIBRILLATION WITH RVR (H): Primary | ICD-10-CM

## 2022-01-01 DIAGNOSIS — R40.4 UNRESPONSIVE EPISODE: ICD-10-CM

## 2022-01-01 DIAGNOSIS — S06.9XAS COGNITIVE DEFICIT AS LATE EFFECT OF TRAUMATIC BRAIN INJURY (H): Primary | ICD-10-CM

## 2022-01-01 DIAGNOSIS — F03.90 DEMENTIA WITHOUT BEHAVIORAL DISTURBANCE, UNSPECIFIED DEMENTIA TYPE: Primary | ICD-10-CM
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
## 2022-01-01 DIAGNOSIS — S06.5XAA SDH (SUBDURAL HEMATOMA) (H): ICD-10-CM

## 2022-01-01 DIAGNOSIS — F33.0 MILD EPISODE OF RECURRENT MAJOR DEPRESSIVE DISORDER (H): Primary | ICD-10-CM

## 2022-01-01 DIAGNOSIS — Z86.73 HISTORY OF CVA (CEREBROVASCULAR ACCIDENT): Primary | ICD-10-CM

## 2022-01-01 DIAGNOSIS — S06.5XAA SDH (SUBDURAL HEMATOMA) (H): Primary | ICD-10-CM

## 2022-01-01 DIAGNOSIS — E46 PROTEIN-CALORIE MALNUTRITION, UNSPECIFIED SEVERITY (H): Primary | ICD-10-CM

## 2022-01-01 DIAGNOSIS — E86.0 DEHYDRATION: ICD-10-CM

## 2022-01-01 DIAGNOSIS — E44.0 MODERATE PROTEIN-CALORIE MALNUTRITION (H): ICD-10-CM

## 2022-01-01 DIAGNOSIS — Z51.5 HOSPICE CARE PATIENT: ICD-10-CM

## 2022-01-01 DIAGNOSIS — S70.02XD HEMATOMA OF LEFT HIP, SUBSEQUENT ENCOUNTER: ICD-10-CM

## 2022-01-01 DIAGNOSIS — D64.9 ANEMIA, UNSPECIFIED TYPE: ICD-10-CM

## 2022-01-01 DIAGNOSIS — E86.1 HYPOTENSION DUE TO HYPOVOLEMIA: ICD-10-CM

## 2022-01-01 DIAGNOSIS — I48.20 CHRONIC ATRIAL FIBRILLATION (H): ICD-10-CM

## 2022-01-01 DIAGNOSIS — S01.312A LACERATION OF AURICLE OF LEFT EAR, INITIAL ENCOUNTER: ICD-10-CM

## 2022-01-01 DIAGNOSIS — R41.89 COGNITIVE DEFICIT AS LATE EFFECT OF TRAUMATIC BRAIN INJURY (H): Primary | ICD-10-CM

## 2022-01-01 DIAGNOSIS — N39.42 URINARY INCONTINENCE WITHOUT SENSORY AWARENESS: Primary | ICD-10-CM

## 2022-01-01 DIAGNOSIS — S06.9X9D TRAUMATIC BRAIN INJURY WITH LOSS OF CONSCIOUSNESS, SUBSEQUENT ENCOUNTER: ICD-10-CM

## 2022-01-01 DIAGNOSIS — Z20.822 EXPOSURE TO 2019 NOVEL CORONAVIRUS: Primary | ICD-10-CM

## 2022-01-01 LAB
CREATININE (EXTERNAL): 1.27 MG/DL (ref 0.73–1.18)
GFR ESTIMATED (EXTERNAL): 55 ML/MIN/1.73M2
GLUCOSE (EXTERNAL): 130 MG/DL (ref 70–100)
POTASSIUM (EXTERNAL): 4.3 MMOL/L (ref 3.5–5.1)

## 2022-01-01 PROCEDURE — 99497 ADVNCD CARE PLAN 30 MIN: CPT | Performed by: NURSE PRACTITIONER

## 2022-01-01 PROCEDURE — 99310 SBSQ NF CARE HIGH MDM 45: CPT | Mod: GV | Performed by: NURSE PRACTITIONER

## 2022-01-01 PROCEDURE — 99309 SBSQ NF CARE MODERATE MDM 30: CPT | Performed by: NURSE PRACTITIONER

## 2022-01-01 PROCEDURE — 99316 NF DSCHRG MGMT 30 MIN+: CPT | Performed by: NURSE PRACTITIONER

## 2022-01-01 PROCEDURE — 99310 SBSQ NF CARE HIGH MDM 45: CPT | Performed by: NURSE PRACTITIONER

## 2022-01-01 PROCEDURE — 99305 1ST NF CARE MODERATE MDM 35: CPT | Performed by: INTERNAL MEDICINE

## 2022-01-01 PROCEDURE — S0630 REMOVAL OF SUTURES: HCPCS | Performed by: NURSE PRACTITIONER

## 2022-01-01 PROCEDURE — 99309 SBSQ NF CARE MODERATE MDM 30: CPT | Performed by: INTERNAL MEDICINE

## 2022-01-01 RX ORDER — LORAZEPAM 0.5 MG/1
0.5 TABLET ORAL EVERY 4 HOURS PRN
Qty: 1 TABLET | Refills: 0
Start: 2022-01-01

## 2022-01-01 RX ORDER — TAMSULOSIN HYDROCHLORIDE 0.4 MG/1
0.8 CAPSULE ORAL EVERY EVENING
Qty: 30 CAPSULE | Refills: 11
Start: 2022-01-01 | End: 2022-01-01

## 2022-01-01 RX ORDER — LEVOTHYROXINE SODIUM 25 UG/1
25 TABLET ORAL DAILY
Qty: 30 TABLET | Refills: 11 | Status: SHIPPED | OUTPATIENT
Start: 2022-01-01 | End: 2022-01-01

## 2022-01-01 RX ORDER — ACETAMINOPHEN 500 MG
1000 TABLET ORAL 3 TIMES DAILY
Qty: 60 TABLET | Refills: 11
Start: 2022-01-01 | End: 2022-01-01

## 2022-01-01 RX ORDER — TAMSULOSIN HYDROCHLORIDE 0.4 MG/1
0.4 CAPSULE ORAL EVERY EVENING
Qty: 30 CAPSULE | Refills: 11
Start: 2022-01-01 | End: 2022-01-01

## 2022-01-01 RX ORDER — SULFAMETHOXAZOLE/TRIMETHOPRIM 800-160 MG
1 TABLET ORAL 2 TIMES DAILY
Qty: 14 TABLET | Refills: 0
Start: 2022-01-01 | End: 2022-01-01

## 2022-01-01 RX ORDER — TROLAMINE SALICYLATE 10 G/100G
CREAM TOPICAL 2 TIMES DAILY
Qty: 1 G | Refills: 0
Start: 2022-01-01

## 2022-01-01 RX ORDER — CITALOPRAM HYDROBROMIDE 20 MG/1
TABLET ORAL
Qty: 30 TABLET | Refills: 11 | Status: SHIPPED | OUTPATIENT
Start: 2022-01-01 | End: 2022-01-01

## 2022-01-01 RX ORDER — AMOXICILLIN 250 MG
1 CAPSULE ORAL 2 TIMES DAILY
COMMUNITY
End: 2022-01-01

## 2022-01-01 RX ORDER — LEVOTHYROXINE SODIUM 25 UG/1
25 TABLET ORAL DAILY
Qty: 30 TABLET | Refills: 11
Start: 2022-01-01 | End: 2022-01-01

## 2022-01-01 RX ORDER — MORPHINE SULFATE 100 MG/5ML
2.5 SOLUTION ORAL EVERY 4 HOURS
Qty: 1 ML | Refills: 0
Start: 2022-01-01

## 2022-01-01 RX ORDER — LANOLIN ALCOHOL/MO/W.PET/CERES
3 CREAM (GRAM) TOPICAL AT BEDTIME
COMMUNITY
End: 2022-01-01

## 2022-01-01 RX ORDER — POLYETHYLENE GLYCOL 3350 17 G/17G
17 POWDER, FOR SOLUTION ORAL DAILY
COMMUNITY
End: 2022-01-01

## 2022-01-01 RX ORDER — METOPROLOL SUCCINATE 25 MG/1
12.5 TABLET, EXTENDED RELEASE ORAL DAILY
COMMUNITY
End: 2022-01-01

## 2022-01-01 RX ORDER — TAMSULOSIN HYDROCHLORIDE 0.4 MG/1
0.8 CAPSULE ORAL EVERY EVENING
Qty: 30 CAPSULE | Refills: 11 | Status: SHIPPED | OUTPATIENT
Start: 2022-01-01 | End: 2022-01-01

## 2022-01-01 RX ORDER — TROLAMINE SALICYLATE 10 G/100G
CREAM TOPICAL 2 TIMES DAILY
Qty: 1 G | Refills: 0
Start: 2022-01-01 | End: 2022-01-01

## 2022-01-01 ASSESSMENT — MIFFLIN-ST. JEOR
SCORE: 1298.14
SCORE: 1298.14
SCORE: 1316.29
SCORE: 1298.14

## 2022-01-05 NOTE — PROGRESS NOTES
[facility] GERIATRIC SERVICES  Chief Complaint   Patient presents with     [place] [facility] Record Number:  [id number]  Place of Service where encounter took place:  [facility] ([facility] [[id number]]    HPI:    Sandeep Tapia  is 87 year old (10/27/1934), who is being seen today for a federally mandated E/M visit. 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    ALLERGIES:Patient has no known allergies.  PAST MEDICAL HISTORY: History reviewed. No pertinent past medical history.  PAST SURGICAL HISTORY:   has no past surgical history on file.  FAMILY HISTORY: family history is not on file.  SOCIAL HISTORY:  reports that he has quit smoking. He has never used smokeless tobacco. He reports previous alcohol use.    MEDICATIONS:     Review of your medicines          Accurate as of January 5, 2022  2:52 PM. If you have any questions, ask your nurse or doctor.            START taking      Dose / Directions   ACE/ARB/ARNI NOT PRESCRIBED  Commonly known as: INTENTIONAL  Used for: Hypertension, unspecified type  Started by: REBECCA Pisano CNP      Please choose reason not prescribed from choices below.  Refills: 0        CONTINUE these medicines which have NOT CHANGED      Dose / Directions   acetaminophen 500 MG tablet  Commonly known as: TYLENOL  Used for: Recurrent falls      Dose: 1,000 mg  Take 2 tablets (1,000 mg) by mouth every 8 hours as needed for mild  "pain  Quantity: 60 tablet  Refills: 11     aspirin 81 MG chewable tablet  Commonly known as: ASA  Used for: Chronic atrial fibrillation (H)      Dose: 81 mg  Take 1 tablet (81 mg) by mouth daily  Quantity: 30 tablet  Refills: 0     atorvastatin 20 MG tablet  Commonly known as: LIPITOR  Used for: History of CVA (cerebrovascular accident)      Dose: 10 mg  Take 0.5 tablets (10 mg) by mouth daily  Quantity: 30 tablet  Refills: 11     CARBOXYMETHYLCELLULOSE SODIUM OP      Dose: 1 drop  Place 1 drop into both eyes every 6 hours as needed  Refills: 0     citalopram 20 MG tablet  Commonly known as: celeXA  Used for: Mild episode of recurrent major depressive disorder (H)      TAKE 1 TAB BY MOUTH ONCE DAILY FOR DEPRESSION  Quantity: 30 tablet  Refills: 11     Icy Hot Power 16 % Gel  Generic drug: Menthol (Topical Analgesic)      Externally apply topically daily as needed  Refills: 0     levothyroxine 50 MCG tablet  Commonly known as: SYNTHROID/LEVOTHROID  Used for: Hypothyroidism, unspecified type      Dose: 50 mcg  Take 1 tablet (50 mcg) by mouth daily  Quantity: 30 tablet  Refills: 11     tamsulosin 0.4 MG capsule  Commonly known as: FLOMAX  Used for: Benign prostatic hyperplasia without lower urinary tract symptoms      TAKE 1 CAP BY MOUTH ONCE DAILY FOR BPH  Quantity: 30 capsule  Refills: 11           Case Management:  I have reviewed the care plan and MDS and do agree with the plan. Patient's desire to return to the community is present, but is not able due to care needs . Information reviewed:  Medications, vital signs, orders, and nursing notes.    ROS:  4 point ROS including Respiratory, CV, GI and , other than that noted in the HPI,  is negative    Vitals:  /51   Pulse 59   Temp 97.2  F (36.2  C)   Resp 18   Ht 1.727 m (5' 8\")   Wt 66.7 kg (147 lb)   SpO2 96%   BMI 22.35 kg/m    Body mass index is 22.35 kg/m .  Exam:  GENERAL APPEARANCE:  in no distress, appears healthy  ENT:  Mouth and posterior " oropharynx normal, moist mucous membranes  RESP:  lungs clear to auscultation   CV:  peripheral edema mild+ in lower legs  ABDOMEN:  bowel sounds normal  M/S:   Gait and station abnormal using walker  SKIN:  Inspection of skin and subcutaneous tissue baseline  NEURO:   Examination of sensation by touch normal  PSYCH:  oriented X 3    Lab/Diagnostic data:   Labs done in SNF are in [facility]. Please refer to them using [employer]/Care Everywhere.    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

## 2022-02-02 PROBLEM — N39.42 URINARY INCONTINENCE WITHOUT SENSORY AWARENESS: Status: ACTIVE | Noted: 2022-01-01

## 2022-02-02 NOTE — PROGRESS NOTES
"[facility] GERIATRICS    Chief Complaint   Patient presents with     RECHECK     HPI:  Sandeep Tapia is a 87 year old  (10/27/1934), who is being seen today for an episodic care visit at: [facility] [[id number]]. Today's concern is:   1. Mild episode of recurrent major depressive disorder (H)    2. SDH (subdural hematoma) (H)    3. Urinary incontinence without sensory awareness      Patient seen today for follow up, displeased to hear that he is experiencing moderate level of normal age related decline, post SDH with probable resolution, did nto restart warfarin, no s/s CVA activity, newer increase in incontinence urine, using towels to sit and lie on, leaking at night and day, frequent urge to urinate but only output is scanty otherwise leaks in underwear, prefers to not use pads or briefs as this makes feel old, spent approx 30 minutes reviewing current status and making a plan.    Allergies, and PMH/PSH reviewed in EPIC today.  REVIEW OF SYSTEMS:  4 point ROS including Respiratory, CV, GI and , other than that noted in the HPI,  is negative    Objective:   /55   Pulse 58   Temp 97.2  F (36.2  C)   Resp 18   Ht 1.727 m (5' 8\")   Wt 64.9 kg (143 lb)   SpO2 98%   BMI 21.74 kg/m    GENERAL APPEARANCE:  in no distress, appears healthy  ENT:  Mouth and posterior oropharynx normal, moist mucous membranes  RESP:  lungs clear to auscultation   CV:  no edema  ABDOMEN:  bowel sounds normal  M/S:   Gait and station abnormal shuffling gait  SKIN:  Inspection of skin and subcutaneous tissue baseline  NEURO:   Examination of sensation by touch normal  PSYCH:  oriented X 3, memory impaired     Labs done in SNF are in [facility]. Please refer to them using [employer]/Care Everywhere.    Assessment/Plan:  (F33.0) Mild episode of recurrent major depressive disorder (H)  (primary encounter diagnosis)  (S06.5X9A) SDH (subdural hematoma) (H)  (N39.42) Urinary incontinence without sensory " awareness  Comment: progressive decline, mild sadness, no s/s CVA activity, unable to control urine output  Plan:   -Dtr Ellen to contact [place] nursing RE: chucks, pads and pull-ups  -pending UA/UC, attempting to collect sample today  -Hgb, BMP, TSH on 2/3  -current med regimen reviewed as appropriate  -plan to follow up after lab results return        Electronically signed by: REBECCA Pisano CNP

## 2022-02-04 NOTE — PROGRESS NOTES
"[facility] GERIATRICS    Chief Complaint   Patient presents with     RECHECK     HPI:  Sandeep Tapia is a 87 year old  (10/27/1934), who is being seen today for an episodic care visit at: [facility] ([facility]) [[id number]]. Today's concern is:   1. Stage 3 chronic kidney disease, unspecified whether stage 3a or 3b CKD (H)    2. Urinary tract infection without hematuria, site unspecified    3. Hypothyroidism, unspecified type      Patient having progressive normal age related decline, now having s/s UTI, newer incontinence, weak, decreased appetite, UA returned suspect for UTI, labs on 2/3 with Hgb 10.2, TSH 1.33, creat 1.63, GFR 41, pleasant, appears thin, ambulatory with walker, socializes, having more difficulty with ADL completion.    Allergies, and PMH/PSH reviewed in EPIC today.  REVIEW OF SYSTEMS:  4 point ROS including Respiratory, CV, GI and , other than that noted in the HPI,  is negative    Objective:   /55   Pulse 61   Temp 97.3  F (36.3  C)   Resp 18   Ht 1.727 m (5' 8\")   Wt 64.9 kg (143 lb)   SpO2 98%   BMI 21.74 kg/m    GENERAL APPEARANCE:  in no distress, thin  ENT:  Mouth and posterior oropharynx normal, moist mucous membranes  RESP:  lungs clear to auscultation   CV:  no edema  ABDOMEN:  bowel sounds normal  M/S:   Gait and station abnormal using walker  SKIN:  Inspection of skin and subcutaneous tissue baseline  NEURO:   Examination of sensation by touch normal  PSYCH:  oriented X 3    Labs done in SNF are in [place] EPIC. Please refer to them using [facility]/Care Everywhere.    Assessment/Plan:  (N18.30) Stage 3 chronic kidney disease, unspecified whether stage 3a or 3b CKD (H)  (primary encounter diagnosis)  Comment: labs 2/3 creat 1.63, GFR 41  Plan: dose medications renally as possible  -staff to encourage fluids  -repeat BMP PRN    (N39.0) Urinary tract infection without hematuria, site unspecified  Comment: having dark urine, increased incontinence  Plan: " previously called daughter RE: incontinent supplies  -reminded to wear pads given by facility nurse  -bactrim DS BID x7 days  -pending culture    (E03.9) Hypothyroidism, unspecified type  Comment: TSH 1.33  Plan: continue levothyroxine 50mcg  -consider reducing levo dose from 50 to 25 if status does not improve   -plan to follow up on status and plan next week        Electronically signed by: REBECCA Pisano CNP

## 2022-02-07 NOTE — PROGRESS NOTES
"[facility] GERIATRICS    Chief Complaint   Patient presents with     RECHECK     HPI:  Sandeep Tapia is a 87 year old  (10/27/1934), who is being seen today for an episodic care visit at: [facility] ([facility]) [[id number]]. Today's concern is:   1. Stage 3 chronic kidney disease, unspecified whether stage 3a or 3b CKD (H)    2. Hematoma of left hip, subsequent encounter    3. Hypothyroidism, unspecified type    4. Urinary tract infection without hematuria, site unspecified      Patient seen for follow up, recent GFR down to 41, appears weak, mild dehydration and confusion, new hematoma and pain R hip at crest, skin bruised, palpable hematoma approx size credit card under skin, unknown etiology, TSH was 1.33, recent potential UTI with increased frequency and incontinence, overall appears healthy.    Allergies, and PMH/PSH reviewed in EPIC today.  REVIEW OF SYSTEMS:  4 point ROS including Respiratory, CV, GI and , other than that noted in the HPI,  is negative    Objective:   /55   Pulse 54   Temp 97.2  F (36.2  C)   Resp 18   Ht 1.727 m (5' 8\")   Wt 64.9 kg (143 lb)   SpO2 93%   BMI 21.74 kg/m    GENERAL APPEARANCE:  in no distress, appears healthy  ENT:  Mouth and posterior oropharynx normal, moist mucous membranes  RESP:  lungs clear to auscultation   CV:  no edema  ABDOMEN:  bowel sounds normal  M/S:   Gait and station abnormal using walker  SKIN:  Inspection of skin and subcutaneous tissue baseline  NEURO:   Examination of sensation by touch normal  PSYCH:  oriented X 3    Labs done in SNF are in [facility]. Please refer to them using [employer]/Care Everywhere.    Assessment/Plan:  (N18.30) Stage 3 chronic kidney disease, unspecified whether stage 3a or 3b CKD (H)  (primary encounter diagnosis)  Comment: labs 2/3 creat 1.63, GFR 41  Plan: dose medications renally as possible  -repeat BMP in 3-6 months    (S70.02XD) Hematoma of left hip, subsequent encounter  Comment: new " Patient currently in bed resting eyes closed , no distress noted   q 15 minutes monitored will continue to  monitor  onset, underlying floating, unknown etiology  Plan: allow to self resolve, may take 2-3 months  -fall risk, encouraged use of walker    (E03.9) Hypothyroidism, unspecified type  Comment: TSH was 1.33, mild lethargy  Plan: change levothyroxine from 50 to 25mcg daily  -follow up TSH in 3-6 months    (N39.0) Urinary tract infection without hematuria, site unspecified  Comment: US appeared potential, UC pending  Plan: bactrim DX BID x7 days  -if UC returns clean plan to discontinue bactrim        Electronically signed by: REBECCA Pisano CNP

## 2022-03-07 PROBLEM — E46 PROTEIN-CALORIE MALNUTRITION (H): Status: ACTIVE | Noted: 2022-01-01

## 2022-03-07 NOTE — PROGRESS NOTES
"[facility] GERIATRICS    Chief Complaint   Patient presents with     Wellness Visit     HPI:  Sandeep Tapia is a 87 year old  (10/27/1934), who is being seen today for an episodic care visit at: [facility] ([facility]) [[id number]]. Today's concern is:    1. Mild protein-calorie malnutrition (H)    2. Stage 3 chronic kidney disease, unspecified whether stage 3a or 3b CKD (H)    3. Hypothyroidism, unspecified type    4. Mild episode of recurrent major depressive disorder (H)      Patient seen in [place] apartment, is slowly losing weight, BMI 20.56, decreased appetite, spouse  last year, talked about her/legacy today, denies crying, does think about her through day and night plus talks to her at times, patient believes this to be healthy and I concur, labs in Feb creat 1.63, TSH 1.33, overall no distress, pleasant and fairly independent.    Allergies, and PMH/PSH reviewed in EPIC today.  REVIEW OF SYSTEMS:  4 point ROS including Respiratory, CV, GI and , other than that noted in the HPI,  is negative    Objective:   /60   Pulse 64   Temp 97  F (36.1  C)   Resp 18   Ht 1.727 m (5' 8\")   Wt 61.3 kg (135 lb 3.2 oz)   SpO2 98%   BMI 20.56 kg/m    GENERAL APPEARANCE:  in no distress, appears healthy  ENT:  Mouth and posterior oropharynx normal, moist mucous membranes  RESP:  lungs clear to auscultation   CV:  no edema  ABDOMEN:  bowel sounds normal  M/S:   Gait and station abnormal unsteady gait  SKIN:  Inspection of skin and subcutaneous tissue baseline  NEURO:   Examination of sensation by touch normal  PSYCH:  oriented X 3    Labs done in SNF are in [facility]. Please refer to them using [facility]/Care Everywhere.    Assessment/Plan:  (E44.1) Mild protein-calorie malnutrition (H)  (primary encounter diagnosis)  Comment: BMI 20.56  Plan: if BMI drops to <20 may need to start supplement    (N18.30) Stage 3 chronic kidney disease, unspecified whether stage 3a or 3b CKD (H)  Comment: " "labs on 2/3 creat 1.63, GFR 41  Plan: dose medications renally as possible  -periodic BMP's    (E03.9) Hypothyroidism, unspecified type  Comment: TSH on 2/3 was 1.33  Plan: continue levothyroxine reduced on 2/3 from 50 to 25mcg  -recheck TSH in 3-6 months    (F33.0) Mild episode of recurrent major depressive disorder (H)  Comment: mild depression, does not affect ADL's  Plan: continue celexa 20mg daily  -consider trial mirtazapine if symptoms exacerbate        Electronically signed by: REBECCA Pisano CNP     Annual Wellness Visit    Are you in the first 12 months of your Medicare Part B coverage?  No    Physical Health:    In general, how would you rate your overall physical health? excellent    Outside of work, how many days during the week do you exercise?none    Outside of work, approximately how many minutes a day do you exercise?not applicable    If you drink alcohol do you typically have >3 drinks per day or >7 drinks per week? No    Do you usually eat at least 4 servings of fruit and vegetables a day, include whole grains & fiber and avoid regularly eating high fat or \"junk\" foods? Yes    Do you have any problems taking medications regularly? No    Do you have any side effects from medications? not applicable    Needs assistance for the following daily activities: transportation, preparing meals, money management and taking medicine    Which of the following safety concerns are present in your home?  none identified     Hearing impairment: No    In the past 6 months, have you been bothered by leaking of urine? yes    Mental Health:    In general, how would you rate your overall mental or emotional health? excellent      PHQ-2 Score:       PHQ-2 ( 1999 [employer]) 3/7/2022 2/8/2021   Q1: Little interest or pleasure in doing things 1 0   Q2: Feeling down, depressed or hopeless 1 0   PHQ-2 Score 2 0   PHQ-2 Total Score (12-17 Years)- Positive if 3 or more points; Administer PHQ-A if positive - 0      "     Do you feel safe in your environment? Yes    Have you ever done Advance Care Planning? (For example, a Health Directive, POLST, or a discussion with a medical provider or your loved ones about your wishes)? Yes, advance care planning is on file.    Fall risk:  Fallen 2 or more times in the past year?: Yes  Any fall with injury in the past year?: Yes    Cognitive Screening: Not appropriate due to known dementia    Do you have sleep apnea, excessive snoring or daytime drowsiness?: no    Current providers sharing in care for this patient include:   Patient Care Team:  Kirby Fuentes APRN CNP as PCP - General (Nurse Practitioner)  Brian Ruiz MD as MD (Internal Medicine - Geriatric Medicine)  ([facility]), [facility]  Eli Nix MA as Medical Assistant  Nancy Barragan CMA as Medical Assistant    REBECCA Pisano CNP

## 2022-03-16 NOTE — PROGRESS NOTES
"[facility] GERIATRICS    Chief Complaint   Patient presents with     RECHECK     HPI:  Sandeep Tapia is a 87 year old  (10/27/1934), who is being seen today for an episodic care visit at: [facility] ([facility]) [[id number]]. Today's concern is:   1. History of CVA (cerebrovascular accident)    2. Traumatic brain injury with loss of consciousness, subsequent encounter    3. Dehydration      Patient seen today for follow up, yesterday was confused, thought he was at [facility], dehydrated, weak, confused, difficulty starting urination stream, increased incontinence, today confused, Hx of CVA and TBI but no apparent exacerbation, had previous SDH also but per neurology had cleared, now sleeping at 10am, missed [facility], dry again, still incontinent, pleasantly confused, repetitive, appears neither healthy nor ill, no distress.  Of note labs 2/3 with CKD otherwise clean.    Allergies, and PMH/PSH reviewed in EPIC today.  REVIEW OF SYSTEMS:  4 point ROS including Respiratory, CV, GI and , other than that noted in the HPI,  is negative    Objective:   /62   Pulse 65   Temp 97  F (36.1  C)   Resp 20   Ht 1.727 m (5' 8\")   Wt 62.9 kg (138 lb 9.6 oz)   SpO2 100%   BMI 21.07 kg/m    GENERAL APPEARANCE:  in no distress, oriented  ENT:  Mouth and posterior oropharynx normal, moist mucous membranes  RESP:  lungs clear to auscultation   CV:  no edema, rate-normal  ABDOMEN:  bowel sounds normal  M/S:   Gait and station abnormal using walker  SKIN:  Inspection of skin and subcutaneous tissue baseline  NEURO:   Examination of sensation by touch normal  PSYCH:  oriented X 3, memory impaired     Labs done in SNF are in [facility]. Please refer to them using [facility]/Care Everywhere.    Assessment/Plan:  (Z86.73) History of CVA (cerebrovascular accident)  (primary encounter diagnosis)  (S06.9X9D) Traumatic brain injury with loss of consciousness, subsequent encounter  (E86.0) " Dehydration  Comment: no apparent exacerbation of brain injury nor s/s CVA, confused, dry, increased incontinence  Plan:   -staff to push fluids  -patient to sit when urinating to improve bladder clearance  -to have glass of water and drink some after micturation  -UA/UC to rule out UTI  -probable exacerbation of CKD; consider labs if starting ABX  -continue ASA81 and lipitor  -plan to follow up after urine returns      Electronically signed by: REBECCA Pisano CNP

## 2022-03-30 NOTE — PROGRESS NOTES
"[facility] GERIATRICS    Chief Complaint   Patient presents with     RECHECK     HPI:  Sandeep Tapia is a 87 year old  (10/27/1934), who is being seen today for an episodic care visit at: [facility] ([facility]) [[id number]]. Today's concern is:   1. SDH (subdural hematoma) (H)    2. Recurrent falls    3. Laceration of auricle of left ear, initial encounter      Patient seen today on request, fall apparently on pm of 3/29, staff not informed, hit L side of head/ear on something unknown and now approx 3-4cm open wound proximal auricle L ear, bleeding last evening and used band-aids to cover, went to sleep and blend on pillow, blood drop son floor in bathroom, facility staff noticed this am for morning med pass, exam today auricle still bleeding with dried blood on face and pudding like blood in ears, cleaned out to evaluate, apparent that needs approximation intervention, also Hx of SDH and at risk for exacerbation due to head strike, appears healthy, not confused.    Allergies, and PMH/PSH reviewed in EPIC today.  REVIEW OF SYSTEMS:  4 point ROS including Respiratory, CV, GI and , other than that noted in the HPI,  is negative    Objective:   /62   Pulse 65   Temp (!) 96.1  F (35.6  C)   Resp 20   Ht 1.727 m (5' 8\")   Wt 62.9 kg (138 lb 9.6 oz)   SpO2 98%   BMI 21.07 kg/m    GENERAL APPEARANCE:  in no distress, appears healthy  ENT:  Mouth and posterior oropharynx normal, moist mucous membranes  RESP:  lungs clear to auscultation   CV:  no edema, rate-normal  ABDOMEN:  bowel sounds normal  M/S:   Gait and station abnormal unsteady gait  SKIN:  Inspection of skin and subcutaneous tissueas in HPI  NEURO:   Examination of sensation by touch normal  PSYCH:  oriented X 3, memory impaired     Labs done in SNF are in [facility]. Please refer to them using [employer]/Care Everywhere.    Assessment/Plan:  (S06.5X9A) SDH (subdural hematoma) (H)  (primary encounter diagnosis)  (R29.6) " Recurrent falls  (S01.312A) Laceration of auricle of left ear, initial encounter  Comment: unsteady gait, moderate safety awareness, SDH risk, L ear laceration bleeding  Plan:   -fall risk, limited safety awareness, did not use call button  -OK sent to ER for ear laceration repair  -considering PT/OT eval for frequent falls after return        Electronically signed by: REBECCA Pisano CNP

## 2022-04-04 NOTE — PROGRESS NOTES
"[facility] GERIATRICS    Chief Complaint   Patient presents with     RECHECK     HPI:  Sandeep Tapia is a 87 year old  (10/27/1934), who is being seen today for an episodic care visit at: [facility] ([facility]) [[id number]]. Today's concern is:   1. 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 Fall, subsequent encounter    3. Laceration of antihelix of left ear, subsequent encounter      Patient seen today after ER visit 3/30, overnight had fallen against bathroom doorway overnight using toilet and split L ear open, was bleeding so he bandaged up and went to back to sleep without calling for assistance, mild cognitive limitations, pleasant personality, today L ear sutures in place, is picking at area and wiping due to continued scant residual bleeding between sutures, no s/s infection albeit area is still pink tone,ear never cleaned out well with dried blood in folds, overall no distress.      Allergies, and PMH/PSH reviewed in EPIC today.  REVIEW OF SYSTEMS:  4 point ROS including Respiratory, CV, GI and , other than that noted in the HPI,  is negative    Objective:   /62   Pulse 62   Temp 97.5  F (36.4  C)   Resp 20   Ht 1.727 m (5' 8\")   Wt 62.9 kg (138 lb 9.6 oz)   SpO2 98%   BMI 21.07 kg/m    GENERAL APPEARANCE:  in no distress, appears healthy  ENT:  Mouth and posterior oropharynx normal, moist mucous membranes  RESP:  lungs clear to auscultation   CV:  no edema  ABDOMEN:  bowel sounds normal  M/S:   Gait and station abnormal unsteady gait  SKIN:  Inspection of skin and subcutaneous tissue baseline  NEURO:   Examination of sensation by touch normal  PSYCH:  oriented X 3    Labs done in SNF are in [facility]. Please refer to them using [facility]/Care Everywhere.    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

## 2022-04-06 NOTE — PROGRESS NOTES
"[facility] GERIATRICS  [place] Medical Record Number: [id number]  Chief Complaint   Patient presents with     RECHECK     HPI: Sandeep Tapia is a 87 year old (10/27/1934), who is being seen today for an episodic care visit at: [facility] ([facility]) [[id number]]. Today's concern is:   1. SDH (subdural hematoma) (H)    2. Recurrent falls    3. Laceration of antihelix of left ear, subsequent encounter      Patient seen for follow up in [place], previous CVA plus TBI and SDH potentially all factors in gait disturbance and impulsivity, no s/s brain bleed, recent head CT showed resolution after stopping warfarin, fall again 3/29 with approx 3cm laceration proximal ear, was sutured and started on cipro, incision today approximated, no longer bleeding, difficult to have patient leave area alone and not touch, sutures in place, no distress.    Allergies and PMH/PSH reviewed in EPIC today.    REVIEW OF SYSTEMS:  4 point ROS including Respiratory, CV, GI and , other than that noted in the HPI,  is negative    Objective:   /62   Pulse 62   Temp 96.8  F (36  C)   Resp 20   Ht 1.727 m (5' 8\")   Wt 62.9 kg (138 lb 9.6 oz)   SpO2 98%   BMI 21.07 kg/m    Exam:  GENERAL APPEARANCE:  in no distress, appears healthy  ENT:  Mouth and posterior oropharynx normal, moist mucous membranes  RESP:  lungs clear to auscultation   CV:  no edema  ABDOMEN:  bowel sounds normal  M/S:   Gait and station abnormal using walker  SKIN:  Inspection of skin and subcutaneous tissue baseline  NEURO:   Examination of sensation by touch normal  PSYCH:  oriented X 3    Labs:   Labs done in SNF are in [facility]. Please refer to them using [employer]/Care Everywhere.    Assessment/Plan:  (S06.5X9A) SDH (subdural hematoma) (H)  (primary encounter diagnosis)  Comment: followed by neurology, last CT hematoma had resolved  Plan: continue ASA81 for now, if falls continue may need to discontinue    (R29.6) Recurrent " falls  (S01.312D) Laceration of antihelix of left ear, subsequent encounter  Comment: stubborn personality, dislikes having to as for assistance  Plan: encouraged use of call button   -suture removal on 4/11 planned  -vaseline or bacitracin to ear 1-2x/day to protect  -instructed to go to nurse office today and have ear cleaned up  -plan to follow up RE: status and plan next week      Electronically signed by: REBECCA Pisano CNP

## 2022-04-07 NOTE — LETTER
4/7/2022        RE: Sandeep Tapia  [facility]  [address] [address]        [facility] GERIATRICS    Chief Complaint   Patient presents with     RECHECK     HPI:  Sandeep Tapia is a 87 year old  (10/27/1934), who is being seen today for an episodic care visit at: [facility] (S) [[id number]]. Today's concern is: ***    Allergies, and PMH/PSH reviewed in [facility] today.  REVIEW OF SYSTEMS:  {tncppo81:825458}    Objective:   Pulse 62   Temp 97  F (36.1  C)   SpO2 97%   {Nursing home physical exam :082578}    {fgslab:379166}    Assessment/Plan:  {S DX2:378660}    Orders:  {fgsorders:644704}  ***    Electronically signed by: Juana Carrasco ***          Sincerely,        REBECCA Pisano CNP

## 2022-04-11 NOTE — LETTER
4/11/2022        RE: Sandeep Tapia  [facility]  [address] [address]        [facility] GERIATRICS    Chief Complaint   Patient presents with     RECHECK     HPI:  Sandeep Tapia is a 87 year old  (10/27/1934), who is being seen today for an episodic care visit at: [facility] (S) [[id number]]. Today's concern is: ***    Allergies, and PMH/PSH reviewed in [facility] today.  REVIEW OF SYSTEMS:  {vrwtqk08:753912}    Objective:   Pulse 62   Temp 98.2  F (36.8  C)   SpO2 98%   {Nursing home physical exam :366388}    {fgslab:705964}    Assessment/Plan:  {[facility] DX2:475167}    Orders:  {fgsorders:410087}  ***    Electronically signed by: Juana Carrasco ***          Sincerely,        REBECCA Pisano CNP

## 2022-04-11 NOTE — PROGRESS NOTES
[facility] GERIATRICS    Chief Complaint   Patient presents with     RECHECK     HPI:  Sandeep Tapia is a 87 year old  (10/27/1934), who is being seen today for an episodic care visit at: [facility] ([facility]) [[id number]]. Today's concern is:   1. Mild protein-calorie malnutrition (H)    2. Fall, subsequent encounter    3. Laceration of helix of right ear, subsequent encounter      Patient seen with staff nurse in [place] apartment, thin habitus and appears to be losing weight, BMI 21.07, also unsteady gait, had fall on 3/30 with laceration of R ear helix, sutures placed and started on prophylactic ABX, today appears weak, using walker to ambulate, R ear laceration approximated, used sterile suture removal tray/tools and removed 6 small sutures without incident, tolerated well, no pain, skin intact, cleansed area, instructions given.    Allergies, and PMH/PSH reviewed in [facility] today.  REVIEW OF SYSTEMS:  4 point ROS including Respiratory, CV, GI and , other than that noted in the HPI,  is negative    Objective:   Pulse 62   Temp 98.2  F (36.8  C)   SpO2 98%   GENERAL APPEARANCE:  in no distress, appears healthy, thin  ENT:  Mouth and posterior oropharynx normal, moist mucous membranes  RESP:  lungs clear to auscultation   CV:  no edema  ABDOMEN:  bowel sounds normal  M/S:   Gait and station abnormal unsteady, using walker  SKIN:  Inspection of skin and subcutaneous tissue baseline  NEURO:   Examination of sensation by touch normal  PSYCH:  oriented X 3    Labs done in SNF are in [facility]. Please refer to them using [facility]/Care Everywhere.    Assessment/Plan:  (E44.1) Mild protein-calorie malnutrition (H)  (primary encounter diagnosis)  Comment: BMI 21.07, clothes loose/ill fitting  Plan: staff to encourage intake as possible  -may need supplement if BMI drops <21    (W19.XXXD) Fall, subsequent encounter  (S01.311D) Laceration of helix of right ear, subsequent encounter  Comment: 6 sutures  removed today, tolerated procedure well  Plan: REMOVAL OF SUTURES  -keep ear clean and dry  -discontinue vaseline gauze application  -to stop by nursing office next few days and have examined  -sleep on back or L side for next 2 weeks      Electronically signed by: REBECCA Pisano CNP

## 2022-04-18 NOTE — PROGRESS NOTES
"[facility] GERIATRICS    Chief Complaint   Patient presents with     RECHECK     HPI:  Sandeep Tapia is a 87 year old  (10/27/1934), who is being seen today for an episodic care visit at: [facility] ([facility]) [[id number]]. Today's concern is:   1. Mild episode of recurrent major depressive disorder (H)    2. Fall, subsequent encounter    3. Laceration of antihelix of right ear, subsequent encounter      Patient seen for follow up, was lying in bed this am thinking about  spouse,   and expressed sadness and missing her, denies this affecting ADL's or appetite, had fall 3/30 with R ear laceration, sutures removed last week, eval today small scab along one part of 3cm incision, approximated, no s/s infection.    Allergies, and PMH/PSH reviewed in EPIC today.  REVIEW OF SYSTEMS:  4 point ROS including Respiratory, CV, GI and , other than that noted in the HPI,  is negative    Objective:   /62   Pulse 62   Temp 97.5  F (36.4  C)   Resp 20   Ht 1.727 m (5' 8\")   Wt 62.9 kg (138 lb 9.6 oz)   SpO2 97%   BMI 21.07 kg/m    GENERAL APPEARANCE:  in no distress, appears healthy  ENT:  Mouth and posterior oropharynx normal, moist mucous membranes  RESP:  lungs clear to auscultation   CV:  no edema  ABDOMEN:  bowel sounds normal  M/S:   Gait and station abnormal using walker  SKIN:  Inspection of skin and subcutaneous tissue baseline  NEURO:   Examination of sensation by touch normal  PSYCH:  oriented X 3    Labs done in SNF are in [facility]. Please refer to them using [facility]/Care Everywhere.    Assessment/Plan:  (F33.0) Mild episode of recurrent major depressive disorder (H)  (primary encounter diagnosis)  Comment: feels down/depressed RE: spouse death last year  Plan: encouraged to talk with family regarding  -continue celexa 20mg  -if not improving consider trial miratazapine    (W19.XXXD) Fall, subsequent encounter  (S01.583D) Laceration of antihelix of right ear, " subsequent encounter  Comment: no recent falls, moderate safety awareness, R ear laceration approximated  Plan: hospital staff mild concern over how laceration would heal, today no apparent issues  -keep fingers out of ear  -staff to check on overnight for safety  -no plan to follow up again regarding      Electronically signed by: REBECCA Pisano CNP

## 2022-04-25 NOTE — PROGRESS NOTES
"[facility] GERIATRICS    Chief Complaint   Patient presents with     RECHECK     HPI:  Sandeep Tapia is a 87 year old  (10/27/1934), who is being seen today for an episodic care visit at: [facility] ([facility]) [[id number]]. Today's concern is:   1. Urinary incontinence without sensory awareness    2. Urine retention    3. Benign prostatic hyperplasia without lower urinary tract symptoms      Patient seen today on request, complaint of urine hesitancy, feels as if needs to urinate, stands at toilet and sometimes does not void, only to return to toilet 20-30 minutes later and will void, states stream is weak, frustrated being unable to void, has Hx of prostate enlargement and is on flomax, overall status no distress.    Allergies, and PMH/PSH reviewed in EPIC today.  REVIEW OF SYSTEMS:  4 point ROS including Respiratory, CV, GI and , other than that noted in the HPI,  is negative    Objective:   BP (!) 144/75   Pulse 56   Temp 97.2  F (36.2  C)   Resp 20   Ht 1.727 m (5' 8\")   Wt 62.9 kg (138 lb 9.6 oz)   SpO2 99%   BMI 21.07 kg/m    GENERAL APPEARANCE:  in no distress, oriented  ENT:  Mouth and posterior oropharynx normal, moist mucous membranes  RESP:  lungs clear to auscultation   CV:  no edema  ABDOMEN:  bowel sounds normal  M/S:   Gait and station abnormal using walker  SKIN:  Inspection of skin and subcutaneous tissue baseline  NEURO:   Examination of sensation by touch normal  PSYCH:  oriented X 3, memory impaired     Labs done in SNF are in [facility]. Please refer to them using [facility]/Care Everywhere.    Assessment/Plan:  (N39.42) Urinary incontinence without sensory awareness  (primary encounter diagnosis)  (R33.9) Urine retention  (N40.0) Benign prostatic hyperplasia without lower urinary tract symptoms  Comment: frustrated over inability to void, spending more time in bathroom  Plan:   -increase flomax from 0.4 to 0.8mg  -instructed to try void when seated to relax, " concentrate  -PT still working with  -encouraged to exercise as instructed by therapy  -plan to follow up on status in 1-2 months        Electronically signed by: REBECCA Pisano CNP

## 2022-05-11 NOTE — PROGRESS NOTES
"[facility] GERIATRICS    Chief Complaint   Patient presents with     RECHECK     HPI:  Sandeep Tapia is a 87 year old  (10/27/1934), who is being seen today for an episodic care visit at: [facility] ([facility]) [[id number]]. Today's concern is:   1. Mild protein-calorie malnutrition (H)    2. SDH (subdural hematoma) (H)    3. Incontinence of feces, unspecified fecal incontinence type    4. Urinary incontinence without sensory awareness      Patient seen today on request, also left message with daughter Ellen RE: status and plan, patient wanted to inquire about gaining weight, BMI at 21.41, states appearing gaunt in mirror, mild weight loss approx 10lbs in past year, also mild cognitive limitations that include limited ability to cook for self, does not ask for assistance, recent falls x2 with injury, will not remove floor rugs that increase fall risk, starting to urinate and defecate on way to toilet and is marking carpet up, denies these things, smiles and states all is OK, is ambulatory, very pleasant and social, wearing briefs.    Allergies, and PMH/PSH reviewed in [facility] today.  REVIEW OF SYSTEMS:  4 point ROS including Respiratory, CV, GI and , other than that noted in the HPI,  is negative    Objective:   BP (!) 144/75   Pulse 56   Temp (!) 96.3  F (35.7  C)   Resp 18   Ht 1.727 m (5' 8\")   Wt 63.9 kg (140 lb 12.8 oz)   SpO2 98%   BMI 21.41 kg/m    GENERAL APPEARANCE:  in no distress, appears healthy  ENT:  Mouth and posterior oropharynx normal, moist mucous membranes  RESP:  lungs clear to auscultation   CV:  no edema  ABDOMEN:  bowel sounds normal  M/S:   Gait and station abnormal using walker  SKIN:  Inspection of skin and subcutaneous tissue baseline  NEURO:   Examination of sensation by touch normal  PSYCH:  oriented X 3    Labs done in SNF are in [facility]. Please refer to them using [facility]/Care Everywhere.    Assessment/Plan:  (E44.1) Mild protein-calorie malnutrition " (H)  (primary encounter diagnosis)  Comment: weight loss approx 10lbs past year, BMI 21.41  Plan: encouraged to start taking lunch in dining area as he is not cooking for left anymore (or just have delivered)  -family to provide supplement     (S06.5X9A) SDH (subdural hematoma) (H)  Comment: potentially resolved per neurology CT in past year  Plan: will continue ASA and statin for now  -follow up neurology PRN    (R15.9) Incontinence of feces, unspecified fecal incontinence type  (N39.42) Urinary incontinence without sensory awareness  Comment: falling, difficulty getting to bathroom  Plan:   -instructed to removed large floor covering in bedroom between bed and toilet  -encouraged to have staff assist with clothes piled on floor  -wear pads/briefs 24/7    Electronically signed by: REBECCA Pisano CNP

## 2022-05-11 NOTE — LETTER
5/11/2022        RE: Sandeep Chavez [facility]  [address] [address]        No notes on file      Sincerely,        REBECCA Pisano CNP

## 2022-06-02 NOTE — PROGRESS NOTES
"[facility] GERIATRICS    Chief Complaint   Patient presents with     RECHECK     HPI:  Sandeep Tapia is a 87 year old  (10/27/1934), who is being seen today for an episodic care visit at: [facility] ([facility]) [[id number]]. Today's concern is:   1. Exposure to 2019 novel coronavirus    2. Stage 3 chronic kidney disease, unspecified whether stage 3a or 3b CKD (H)    3. Urine retention      Patient seen on concern of covid exposure on 5/30 from positive family member that had contact with, rapid tests x2 negative, today sats 98% on RA,lungs are clear, moderate normal lethargy, recent labs with GFR low at 41, decreased fluid intake, also difficulty with bladder emptying but also newer incontinence and started using briefs occasionally for leaking, overall appears to have progressive age related decline.    Allergies, and PMH/PSH reviewed in EPIC today.  REVIEW OF SYSTEMS:  4 point ROS including Respiratory, CV, GI and , other than that noted in the HPI,  is negative    Objective:   /70   Pulse 56   Temp 97.2  F (36.2  C)   Resp 18   Ht 1.727 m (5' 8\")   Wt 63.9 kg (140 lb 12.8 oz)   SpO2 97%   BMI 21.41 kg/m    GENERAL APPEARANCE:  in no distress, appears healthy  ENT:  Mouth and posterior oropharynx normal, moist mucous membranes  RESP:  lungs clear to auscultation   CV:  no edema, rate-normal  ABDOMEN:  bowel sounds normal  M/S:   Gait and station abnormal unsteady gait  SKIN:  Inspection of skin and subcutaneous tissue baseline  NEURO:   Examination of sensation by touch normal  PSYCH:  oriented X 3    Labs done in SNF are in [facility]. Please refer to them using [facility]/Care Everywhere.    Assessment/Plan:  (Z20.822) Exposure to 2019 novel coronavirus  (primary encounter diagnosis)  Comment: exposure on 5/30, asymptomatic  Plan: PCA testing pending, rapid tests negative  -instructed to inform staff if having any symptoms  -staff to check temp/sats daily    (N18.30) Stage 3 " chronic kidney disease, unspecified whether stage 3a or 3b CKD (H)  Comment: recent labs creat 1.63, GFR 41  Plan: educated on need for improved hydration  -repeat BMP in 3-6 months  -dose medications renally as possible    (R33.9) Urine retention  Comment: retention with intermittent incontinence  Plan: increase in tamsulosin does not appear to have improved ability to start and complete urination when toileting  -continue to wear incontinent briefs  -consider urology referral if symptoms do not subside  -instructed to sit when urinating in effort to improve emptying      Electronically signed by: REBECCA Pisano CNP

## 2022-06-02 NOTE — LETTER
"    6/2/2022        RE: Sandeep Tapia  [facility]  [address] [address]        [facility] GERIATRICS    Chief Complaint   Patient presents with     RECHECK     HPI:  Sandeep Tapia is a 87 year old  (10/27/1934), who is being seen today for an episodic care visit at: [facility] (S) [[id number]]. Today's concern is: ***    Allergies, and PMH/PSH reviewed in [facility] today.  REVIEW OF SYSTEMS:  {mghqvw12:522394}    Objective:   /70   Pulse 56   Temp 97.2  F (36.2  C)   Resp 18   Ht 1.727 m (5' 8\")   Wt 63.9 kg (140 lb 12.8 oz)   SpO2 97%   BMI 21.41 kg/m    {Nursing home physical exam :902987}    {fgslab:451372}    Assessment/Plan:  {S DX2:652404}    Orders:  {fgsorders:980810}  ***    Electronically signed by: Eli Nix MA ***          Sincerely,        Kirby Fuentes, REBECCA CNP    "

## 2022-06-02 NOTE — LETTER
"    6/2/2022        RE: Sandeep Tapia  [facility]  [address] [address]        [facility] GERIATRICS    Chief Complaint   Patient presents with     RECHECK     HPI:  Sandeep Tapia is a 87 year old  (10/27/1934), who is being seen today for an episodic care visit at: [facility] ([facility]) [[id number]]. Today's concern is:   1. Exposure to 2019 novel coronavirus    2. Stage 3 chronic kidney disease, unspecified whether stage 3a or 3b CKD (H)    3. Urine retention      Patient seen on concern of covid exposure on 5/30 from positive family member that had contact with, rapid tests x2 negative, today sats 98% on RA,lungs are clear, moderate normal lethargy, recent labs with GFR low at 41, decreased fluid intake, also difficulty with bladder emptying but also newer incontinence and started using briefs occasionally for leaking, overall appears to have progressive age related decline.    Allergies, and PMH/PSH reviewed in EPIC today.  REVIEW OF SYSTEMS:  4 point ROS including Respiratory, CV, GI and , other than that noted in the HPI,  is negative    Objective:   /70   Pulse 56   Temp 97.2  F (36.2  C)   Resp 18   Ht 1.727 m (5' 8\")   Wt 63.9 kg (140 lb 12.8 oz)   SpO2 97%   BMI 21.41 kg/m    GENERAL APPEARANCE:  in no distress, appears healthy  ENT:  Mouth and posterior oropharynx normal, moist mucous membranes  RESP:  lungs clear to auscultation   CV:  no edema, rate-normal  ABDOMEN:  bowel sounds normal  M/S:   Gait and station abnormal unsteady gait  SKIN:  Inspection of skin and subcutaneous tissue baseline  NEURO:   Examination of sensation by touch normal  PSYCH:  oriented X 3    Labs done in SNF are in [facility]. Please refer to them using [employer]/Care Everywhere.    Assessment/Plan:  (Z20.822) Exposure to 2019 novel coronavirus  (primary encounter diagnosis)  Comment: exposure on 5/30, asymptomatic  Plan: PCA testing pending, rapid tests " negative  -instructed to inform staff if having any symptoms  -staff to check temp/sats daily    (N18.30) Stage 3 chronic kidney disease, unspecified whether stage 3a or 3b CKD (H)  Comment: recent labs creat 1.63, GFR 41  Plan: educated on need for improved hydration  -repeat BMP in 3-6 months  -dose medications renally as possible    (R33.9) Urine retention  Comment: retention with intermittent incontinence  Plan: increase in tamsulosin does not appear to have improved ability to start and complete urination when toileting  -continue to wear incontinent briefs  -consider urology referral if symptoms do not subside  -instructed to sit when urinating in effort to improve emptying      Electronically signed by: REBECCA Pisano CNP           Sincerely,        REBECCA Pisano CNP

## 2022-07-25 NOTE — LETTER
"    7/25/2022        RE: Sandeep Tapia  [facility]  [address] [address]        [facility] GERIATRICS    Chief Complaint   Patient presents with     RECHECK     HPI:  Sandeep Tapia is a 87 year old  (10/27/1934), who is being seen today for an episodic care visit at: [facility] ([facility]) [[id number]]. Today's concern is:   1. SDH (subdural hematoma) (H)    2. Dizziness    3. Generalized muscle weakness      Patient seen today on request, also left message with daughter Ellen RE: status and plan, increased decline in overall status, post SDH with neuro followup plus CT's that it was resolving, memory loss, incontinent B&B, weak, deconditioned, leaving soiled clothing on floors, not doing laundry regularly, SBP's in the 120 range, fall risk but none recently reported, appears healthy, fairly independent with ADL's.    Allergies, and PMH/PSH reviewed in EPIC today.  REVIEW OF SYSTEMS:  4 point ROS including Respiratory, CV, GI and , other than that noted in the HPI,  is negative    Objective:   /54   Pulse 64   Temp (!) 96.6  F (35.9  C)   Resp 18   Ht 1.727 m (5' 8\")   Wt 63.4 kg (139 lb 12.8 oz)   SpO2 98%   BMI 21.26 kg/m    GENERAL APPEARANCE:  in no distress, appears healthy  ENT:  Mouth and posterior oropharynx normal, moist mucous membranes  RESP:  lungs clear to auscultation   CV:  no edema, rate-normal  ABDOMEN:  bowel sounds normal  M/S:   Gait and station abnormal unsteady gait  SKIN:  Inspection of skin and subcutaneous tissue baseline  NEURO:   Examination of sensation by touch normal  PSYCH:  oriented X 3    Labs done in SNF are in [facility]. Please refer to them using [facility]/Care Everywhere.    Assessment/Plan:  (S06.5X9A) SDH (subdural hematoma) (H)  (primary encounter diagnosis)  (R42) Dizziness  (M62.81) Generalized muscle weakness  Comment: cognitive changes, incontinent B&B now, lethargic, deconditioned  Plan: " reviewed current medications  -Hgb, BMP, TSH on 8/1  -plan to follow up next week with potential goal;  discontinue ASA or statin, possibly discontinue levothyroxine, reduce tamsulosin pending lab results  -instructed Ellen that I would follow up after labs return       Electronically signed by: REBECCA Pisano CNP             Sincerely,        REBECCA Pisano CNP

## 2022-07-25 NOTE — PROGRESS NOTES
"[facility] GERIATRICS    Chief Complaint   Patient presents with     RECHECK     HPI:  Sandeep Tapia is a 87 year old  (10/27/1934), who is being seen today for an episodic care visit at: [facility] ([facility]) [[id number]]. Today's concern is:   1. SDH (subdural hematoma) (H)    2. Dizziness    3. Generalized muscle weakness      Patient seen today on request, also left message with daughter Ellen RE: status and plan, increased decline in overall status, post SDH with neuro followup plus CT's that it was resolving, memory loss, incontinent B&B, weak, deconditioned, leaving soiled clothing on floors, not doing laundry regularly, SBP's in the 120 range, fall risk but none recently reported, appears healthy, fairly independent with ADL's.    Allergies, and PMH/PSH reviewed in EPIC today.  REVIEW OF SYSTEMS:  4 point ROS including Respiratory, CV, GI and , other than that noted in the HPI,  is negative    Objective:   /54   Pulse 64   Temp (!) 96.6  F (35.9  C)   Resp 18   Ht 1.727 m (5' 8\")   Wt 63.4 kg (139 lb 12.8 oz)   SpO2 98%   BMI 21.26 kg/m    GENERAL APPEARANCE:  in no distress, appears healthy  ENT:  Mouth and posterior oropharynx normal, moist mucous membranes  RESP:  lungs clear to auscultation   CV:  no edema, rate-normal  ABDOMEN:  bowel sounds normal  M/S:   Gait and station abnormal unsteady gait  SKIN:  Inspection of skin and subcutaneous tissue baseline  NEURO:   Examination of sensation by touch normal  PSYCH:  oriented X 3    Labs done in SNF are in [facility]. Please refer to them using [facility]/Care Everywhere.    Assessment/Plan:  (S06.5X9A) SDH (subdural hematoma) (H)  (primary encounter diagnosis)  (R42) Dizziness  (M62.81) Generalized muscle weakness  Comment: cognitive changes, incontinent B&B now, lethargic, deconditioned  Plan: reviewed current medications  -Hgb, BMP, TSH on 8/1  -plan to follow up next week with potential goal;  discontinue ASA or " statin, possibly discontinue levothyroxine, reduce tamsulosin pending lab results  -instructed Ellen that I would follow up after labs return       Electronically signed by: REBECCA Pisano CNP

## 2022-07-28 PROBLEM — F03.90 DEMENTIA WITHOUT BEHAVIORAL DISTURBANCE, UNSPECIFIED DEMENTIA TYPE: Status: ACTIVE | Noted: 2022-01-01

## 2022-07-28 NOTE — LETTER
7/28/2022        RE: Sandeep Chavez [facility]  [address] [address]        No notes on file      Sincerely,        REBECCA Pisano CNP

## 2022-07-28 NOTE — PROGRESS NOTES
"[facility] GERIATRICS    Chief Complaint   Patient presents with     RECHECK     HPI:  Sandeep Tapia is a 87 year old  (10/27/1934), who is being seen today for an episodic care visit at: [facility] ([facility]) [[id number]]. Today's concern is:   1. Dementia without behavioral disturbance, unspecified dementia type (H)    2. Urinary incontinence without sensory awareness    3. Recurrent falls      Patient seen for follow up, also called daughter Brunilda on request RE: status and plan, moderate cognitive limitations, today apartment strong urine odor, clothes on floor in bathroom, bedroom and strewn about with both urine and fecal staining, is confused, needs assistance taking robe off to get dressed, stubborn and does not ask for assistance, recent falls of 2 with injury, overall appears healthy with progressive cognitive decline.    Allergies, and PMH/PSH reviewed in EPIC today.  REVIEW OF SYSTEMS:  4 point ROS including Respiratory, CV, GI and , other than that noted in the HPI,  is negative    Objective:   /54   Pulse 64   Temp 97.3  F (36.3  C)   Resp 18   Ht 1.727 m (5' 8\")   Wt 63.4 kg (139 lb 12.8 oz)   SpO2 98%   BMI 21.26 kg/m    GENERAL APPEARANCE:  in no distress, appears healthy  ENT:  Mouth and posterior oropharynx normal, moist mucous membranes  RESP:  lungs clear to auscultation   CV:  no edema  ABDOMEN:  bowel sounds normal  M/S:   Gait and station abnormal unsteady gait  SKIN:  Inspection of skin and subcutaneous tissue baseline  NEURO:   Examination of sensation by touch normal  PSYCH:  oriented X 3    Labs done in SNF are in [facility]. Please refer to them using [facility]/Care Everywhere.    Assessment/Plan:  (F03.90) Dementia without behavioral disturbance, unspecified dementia type (H)  (primary encounter diagnosis)  (N39.42) Urinary incontinence without sensory awareness  (R29.6) Recurrent falls  Comment: moderate cognitive limitations, pleasant, impulsive, " incontinent B&B, limited safety awareness  Plan:   -daughters to assist in getting apartment cleaned up  -increased support on care plan  -Hgb, BMP, TSH on 8/1  -current medication regimen reviewed as appropriate  -after labs return plan to call Brunilda and review plan, considering discontinue or reduction of ASA, statin, celexa, flomax and/or levothyroxine        Electronically signed by: REBECCA Pisano CNP

## 2022-08-04 NOTE — PROGRESS NOTES
"[facility] GERIATRICS    Chief Complaint   Patient presents with     RECHECK     HPI:  Sandeep Tapia is a 87 year old  (10/27/1934), who is being seen today for an episodic care visit at: [facility] ([facility]) [[id number]]. Today's concern is:   1. Dementia without behavioral disturbance, unspecified dementia type (H)    2. Anemia, unspecified type    3. Stage 3 chronic kidney disease, unspecified whether stage 3a or 3b CKD (H)      Patient seen today for follow up, also left message with daughter Brunilda RE: status and plan, pleasantly confused, progressive cognitive decline and thereby physical limitations, labs on 8/1 with Hgb 8.9, K4.2, creat 1.34, GFR 51 and TSH 4.3, appears healthy, fairly independent in AL apartment, fall risk with unsteady gait, no distress.    Allergies, and PMH/PSH reviewed in EPIC today.  REVIEW OF SYSTEMS:  4 point ROS including Respiratory, CV, GI and , other than that noted in the HPI,  is negative    Objective:   /54   Pulse 64   Temp 97  F (36.1  C)   Resp 18   Ht 1.727 m (5' 8\")   Wt 63.4 kg (139 lb 12.8 oz)   SpO2 99%   BMI 21.26 kg/m    GENERAL APPEARANCE:  in no distress, appears healthy  ENT:  Mouth and posterior oropharynx normal, moist mucous membranes  RESP:  lungs clear to auscultation   CV:  peripheral edema mild+ in lower legs, rate-normal  ABDOMEN:  bowel sounds normal  M/S:   Gait and station abnormal unsteady gait  SKIN:  Inspection of skin and subcutaneous tissue baseline  NEURO:   Examination of sensation by touch normal  PSYCH:  oriented X 3    Labs done in SNF are in [facility]. Please refer to them using [employer]/Care Everywhere.    Assessment/Plan:  (F03.90) Dementia without behavioral disturbance, unspecified dementia type (H)  (primary encounter diagnosis)  Comment: mild to moderate cognitive limitations, able to make needs known  Plan: staff to support as indicated  -moderate safety awareness, educated on importance fo safety " and having staff assist instead of doing everything himself    (D64.9) Anemia, unspecified type  Comment: Hgb 8.9, asymptomatic  Plan: follow up Hgb in 2-3 months    (N18.30) Stage 3 chronic kidney disease, unspecified whether stage 3a or 3b CKD (H)  Comment: labs 8/1 creat 1.34, GFR 51  Plan: dose medications renally as possible  -periodic BMP's  -instructed to drink more water        Electronically signed by: REBECCA Pisano CNP

## 2022-08-04 NOTE — LETTER
8/4/2022        RE: Sandeep Chavez [facility]  [address] [address]        No notes on file      Sincerely,        REBECCA Pisano CNP

## 2022-09-29 PROBLEM — S43.005A DISLOCATION OF LEFT SHOULDER JOINT, INITIAL ENCOUNTER: Status: ACTIVE | Noted: 2022-01-01

## 2022-09-29 PROBLEM — I48.91 ATRIAL FIBRILLATION WITH RVR (H): Status: ACTIVE | Noted: 2020-09-03
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
## 2022-09-29 PROBLEM — Z95.0 CARDIAC PACEMAKER IN SITU: Status: ACTIVE | Noted: 2022-01-01

## 2022-09-29 NOTE — PROGRESS NOTES
[facility]  Primary Care Provider & Clinic: Kirby Fuentes, REBECCA CNP, [address] [facility] [address]  Chief Complaint   Patient presents with     Hospital F/U     [facility] [place] [place] 20[place]     [place] Medical Record Number: [id number]  Place of Service Where Encounter Took Place: [facility] ([facility]) [[id number]]    Sandeep Tapia is a 87 year old (10/27/1934), admitted to the above facility from  [facility]. Hospital stay 22 through 22.    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     Patient seen today for follow up, lying in bed eating breakfast, only wearing incontinent brief, conversation semi-nonsensical and tangential, spoke about moving back with spouse (is ), no recall of why he was in hospital, very pleasant and social, wearing sling, appears healthy, pacer incision approximated, denies pain, no distress.      CODE STATUS/ADVANCE DIRECTIVES DISCUSSION: Full Code - CPR/Full code   Patient's living condition: lives in an assisted living facility  ALLERGIES: No Known Allergies   PAST MEDICAL HISTORY: No past medical history on file.   PAST SURGICAL HISTORY:  has no past surgical history on file.  FAMILY HISTORY: family history is not on file.  SOCIAL HISTORY:  reports that he has quit smoking. He has never used smokeless tobacco. He reports previous alcohol use.    Post Discharge Medication Reconciliation Status: discharge medications reconciled, continue medications without change  Current  "Outpatient Medications   Medication Sig     ACE/ARB/ARNI NOT PRESCRIBED (INTENTIONAL) Please choose reason not prescribed from choices below.     acetaminophen (TYLENOL) 500 MG tablet Take 2 tablets (1,000 mg) by mouth every 8 hours as needed for mild pain     aspirin (ASA) 81 MG chewable tablet Take 1 tablet (81 mg) by mouth daily     atorvastatin (LIPITOR) 20 MG tablet Take 0.5 tablets (10 mg) by mouth daily     CARBOXYMETHYLCELLULOSE SODIUM OP Place 1 drop into both eyes every 6 hours as needed     citalopram (CELEXA) 20 MG tablet TAKE 1 TAB BY MOUTH ONCE DAILY FOR DEPRESSION     levothyroxine (SYNTHROID/LEVOTHROID) 25 MCG tablet Take 1 tablet (25 mcg) by mouth daily     melatonin 3 MG tablet Take 3 mg by mouth At Bedtime     metoprolol succinate ER (TOPROL XL) 25 MG 24 hr tablet Take 12.5 mg by mouth daily     polyethylene glycol (MIRALAX) 17 g packet Take 17 g by mouth daily     senna-docusate (SENOKOT-S/PERICOLACE) 8.6-50 MG tablet Take 1 tablet by mouth 2 times daily     tamsulosin (FLOMAX) 0.4 MG capsule Take 2 capsules (0.8 mg) by mouth every evening     No current facility-administered medications for this visit.     ROS:  4 point ROS including Respiratory, CV, GI and , other than that noted in the HPI,  is negative    Vitals:  /54   Pulse 61   Temp 97.8  F (36.6  C)   Resp 18   Ht 1.727 m (5' 8\")   Wt 64.4 kg (142 lb)   SpO2 94%   BMI 21.59 kg/m    Exam:  GENERAL APPEARANCE:  in no distress, appears healthy  ENT:  Mouth and posterior oropharynx normal, moist mucous membranes  RESP:  lungs clear to auscultation   CV:  no edema  ABDOMEN:  normal bowel sounds, soft, nontender, no hepatosplenomegaly or other masses, bowel sounds normal  M/S:   Gait and station abnormal transfer assist  SKIN:  Inspection of skin and subcutaneous tissue baseline  NEURO:   Examination of sensation by touch normal  PSYCH:  affect and mood normal    Lab/Diagnostic data:  Recent labs in [facility] reviewed by me today. 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  Comment: new tachy ahsish, historical afib, previously on warfarin, pacer placed 9/27  Plan:   -CBC, BMP, TSH on 10/3  -continue metoprolol ER 25mg, new med  -continue ASA81, lipitor  -staff to check VS daily  -follow up [facility] cardiology 10/4     (S43.005D) Dislocation of left shoulder joint, subsequent encounter  (R29.6) Recurrent falls  Comment: from  [place], increased recent falls  Plan: wear sling when up  -WBAT  -APAP PRN for pain  -follow up [facility] ortho on 10/7    (N18.30) Stage 3 chronic kidney disease, unspecified whether stage 3a or 3b CKD (H)  Comment: creat elevated in the 1.4-1.5 range in hospital  Plan: dose medications renally as possible  -BMP on 10/3    (F03.90) Dementia without behavioral disturbance, unspecified dementia type (H)  Comment: progressive cognitive changes and limitations, exacerbated by hospital/anesthesia  Plan: staff to support as indicated  -current goal to transfer back to [place] apartment on discharge        Electronically signed by: REBECCA Pisano CNP

## 2022-09-29 NOTE — LETTER
2022        RE: [facility]  [address] [address]        [facility]ERIATRICS  Primary Care Provider & Clinic: REBECCA Pisano [facility], [address] [address]  Chief Complaint   Patient presents with     Hospital F/U     [facility] [place]     [place] Medical Record Number: [id number]  Place of Service Where Encounter Took Place: [facility] ([facility]U) [[id number]]    Sandeep Tapia is a 87 year old (10/27/1934), [place] to the above facility [place]  [facility] Hospital stay 22 through 22.    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     Patient seen today for follow up, lying in bed eating breakfast, only wearing incontinent brief, conversation semi-nonsensical and tangential, spoke about moving back with spouse (is ), no recall of why he was in hospital, very pleasant and social, wearing sling, appears healthy, pacer incision approximated, denies pain, no distress.      CODE STATUS/ADVANCE DIRECTIVES DISCUSSION: Full Code - CPR/Full code   Patient's living condition: lives in an assisted living facility  ALLERGIES: No Known Allergies   PAST MEDICAL HISTORY: No past medical history on file.   PAST SURGICAL HISTORY:  has no past surgical history on file.  FAMILY HISTORY: family history is not on file.  SOCIAL HISTORY:  reports that he has quit smoking. He has never used smokeless tobacco. He reports previous alcohol use.    Post  "Discharge Medication Reconciliation Status: discharge medications reconciled, continue medications without change  Current Outpatient Medications   Medication Sig     ACE/ARB/ARNI NOT PRESCRIBED (INTENTIONAL) Please choose reason not prescribed from choices below.     acetaminophen (TYLENOL) 500 MG tablet Take 2 tablets (1,000 mg) by mouth every 8 hours as needed for mild pain     aspirin (ASA) 81 MG chewable tablet Take 1 tablet (81 mg) by mouth daily     atorvastatin (LIPITOR) 20 MG tablet Take 0.5 tablets (10 mg) by mouth daily     CARBOXYMETHYLCELLULOSE SODIUM OP Place 1 drop into both eyes every 6 hours as needed     citalopram (CELEXA) 20 MG tablet TAKE 1 TAB BY MOUTH ONCE DAILY FOR DEPRESSION     levothyroxine (SYNTHROID/LEVOTHROID) 25 MCG tablet Take 1 tablet (25 mcg) by mouth daily     melatonin 3 MG tablet Take 3 mg by mouth At Bedtime     metoprolol succinate ER (TOPROL XL) 25 MG 24 hr tablet Take 12.5 mg by mouth daily     polyethylene glycol (MIRALAX) 17 g packet Take 17 g by mouth daily     senna-docusate (SENOKOT-S/PERICOLACE) 8.6-50 MG tablet Take 1 tablet by mouth 2 times daily     tamsulosin (FLOMAX) 0.4 MG capsule Take 2 capsules (0.8 mg) by mouth every evening     No current facility-administered medications for this visit.     ROS:  4 point ROS including Respiratory, CV, GI and , other than that noted in the HPI,  is negative    Vitals:  /54   Pulse 61   Temp 97.8  F (36.6  C)   Resp 18   Ht 1.727 m (5' 8\")   Wt 64.4 kg (142 lb)   SpO2 94%   BMI 21.59 kg/m    Exam:  GENERAL APPEARANCE:  in no distress, appears healthy  ENT:  Mouth and posterior oropharynx normal, moist mucous membranes  RESP:  lungs clear to auscultation   CV:  no edema  ABDOMEN:  normal bowel sounds, soft, nontender, no hepatosplenomegaly or other masses, bowel sounds normal  M/S:   Gait and station abnormal transfer assist  SKIN:  Inspection of skin and subcutaneous tissue baseline  NEURO:   Examination of " sensation by touch normal  PSYCH:  affect and mood normal    Lab/Diagnostic data:  Recent labs in [facility] reviewed by me today.     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  Comment: new tachy ashish, historical afib, previously on warfarin, pacer placed 9/27  Plan:   -CBC, BMP, TSH on 10/3  -continue metoprolol ER 25mg, new med  -continue ASA81, lipitor  -staff to check VS daily  -follow up [facility] cardiology 10/4     (S43.005D) Dislocation of left shoulder joint, subsequent encounter  (R29.6) Recurrent falls  Comment: from [facility] [place], increased recent falls  Plan: wear sling when up  -WBAT  -APAP PRN for pain  -follow up [facility] ortho on 10/7    (N18.30) Stage 3 chronic kidney disease, unspecified whether stage 3a or 3b CKD (H)  Comment: creat elevated in the 1.4-1.5 range in hospital  Plan: dose medications renally as possible  -BMP on 10/3    (F03.90) Dementia without behavioral disturbance, unspecified dementia type (H)  Comment: progressive cognitive changes and limitations, exacerbated by hospital/anesthesia  Plan: staff to support as indicated  -current goal to transfer back to [place] apartment on discharge        Electronically signed by: REBECCA Pisano CNP        Sincerely,        REBECCA Pisano CNP

## 2022-10-03 NOTE — PROGRESS NOTES
"[facility] GERIATRICS    Chief Complaint   Patient presents with     RECHECK     HPI:  Sandeep Tapia is a 87 year old  (10/27/1934), who is being seen today for an episodic care visit at: [facility] ([facility]) [[id number]]. Today's concern is:   1. Atrial fibrillation with RVR (H)    2. Cardiac pacemaker in situ    3. Tachy-ashish syndrome (H)    4. Dislocation of left shoulder joint, initial encounter      Patient seen for follow up, progressive dementia and semi-nonsensical verbal, weak, debilitated, post hospital stay after fall and L shoulder dislocation, denies pain, HR RRR, denies CP, pacer incision approximated, overall appears healthy, thin.    Allergies, and PMH/PSH reviewed in EPIC today.  REVIEW OF SYSTEMS:  4 point ROS including Respiratory, CV, GI and , other than that noted in the HPI,  is negative    Objective:   /64   Pulse 84   Temp 97.6  F (36.4  C)   Resp 16   Ht 1.727 m (5' 8\")   Wt 58.5 kg (129 lb)   SpO2 98%   BMI 19.61 kg/m    GENERAL APPEARANCE:  in no distress, appears healthy  ENT:  Mouth and posterior oropharynx normal, moist mucous membranes  RESP:  lungs clear to auscultation   CV:  no edema, rate-normal  ABDOMEN:  bowel sounds normal  M/S:   Gait and station abnormal transfer assist  SKIN:  Inspection of skin and subcutaneous tissue baseline  NEURO:   Examination of sensation by touch normal  PSYCH:  affect and mood normal    Labs done in SNF are in [facility]. Please refer to them using [facility]/Care Everywhere.    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

## 2022-10-03 NOTE — LETTER
"    10/3/2022        RE: Sandeep Tapia  [facility]  [address] [address]        [facility]    Chief Complaint   Patient presents with     RECHECK     HPI:  Sandeep Tapia is a 87 year old  (10/27/1934), who is being seen today for an episodic care visit at: [facility] (TC) [[id number]]. Today's concern is:   1. Atrial fibrillation with RVR (H)    2. Cardiac pacemaker in situ    3. Tachy-ashish syndrome (H)    4. Dislocation of left shoulder joint, initial encounter      Patient seen for follow up, progressive dementia and semi-nonsensical verbal, weak, debilitated, post hospital stay after fall and L shoulder dislocation, denies pain, HR RRR, denies CP, pacer incision approximated, overall appears healthy, thin.    Allergies, and PMH/PSH reviewed in EP[facility] today.  REVIEW OF SYSTEMS:  4 point ROS including Respiratory, CV, GI and , other than that noted in the HPI,  is negative    Objective:   /64   Pulse 84   Temp 97.6  F (36.4  C)   Resp 16   Ht 1.727 m (5' 8\")   Wt 58.5 kg (129 lb)   SpO2 98%   BMI 19.61 kg/m    GENERAL APPEARANCE:  in no distress, appears healthy  ENT:  Mouth and posterior oropharynx normal, moist mucous membranes  RESP:  lungs clear to auscultation   CV:  no edema, rate-normal  ABDOMEN:  bowel sounds normal  M/S:   Gait and station abnormal transfer assist  SKIN:  Inspection of skin and subcutaneous tissue baseline  NEURO:   Examination of sensation by touch normal  PSYCH:  affect and mood normal    Labs done in SNF are in [facility]. Please refer to them using [facility]/Care Everywhere.    Assessment/Plan:  (I48.91) Atrial fibrillation with RVR (H)  (primary encounter diagnosis)  (Z95.0) Cardiac pacemaker in situ  (I49.5) Tachy-ashish syndrome (H)  Comment: RRR, no edema, denies chest discomfort  Plan: started metoprolol  -continue statin, ASA81  -follow up cardiology on 10/7    (S43.005A) Dislocation of left shoulder " joint, initial encounter  Comment: denies pain, sling in place, WBAT  Plan: staff to assist with ADL's  -APAP PRN for pain  -therapies to continue   -follow up ortho on 10/4      Post Medication Reconciliation Status: Discharge medications reconciled, continue medications without change        Electronically signed by: REBECCA Pisano CNP             Sincerely,        REBECCA Pisano CNP

## 2022-10-06 NOTE — PROGRESS NOTES
"[facility] GERIATRICS    Chief Complaint   Patient presents with     RECHECK     HPI:  Sandeep Tapia is a 87 year old  (10/27/1934), who is being seen today for an episodic care visit at: [facility] ([facility]) [[id number]]. Today's concern is:   1. Protein-calorie malnutrition, unspecified severity (H)    2. Stage 3 chronic kidney disease, unspecified whether stage 3a or 3b CKD (H)    3. Dislocation of left shoulder joint, initial encounter    4. Dementia without behavioral disturbance, unspecified dementia type      Patient seen for follow up, also called daughter Ellen RE: status and plan, resident with BMI 19.61, thin habitus, moderate cognitive limitations, fall on 10/3 with shoulder dislocation, in sling, neuro intact, pain+ with any movement, labs on 10/3 creat 1.84, GFR 35, overall pleasant, appears thin albeit healthy.  Of note, after cardiology appt was dropped off by transport, patient went back to [place] apartment vs returning to [facility] by habit.    Allergies, and PMH/PSH reviewed in EP[facility] today.  REVIEW OF SYSTEMS:  4 point ROS including Respiratory, CV, GI and , other than that noted in the HPI,  is negative    Objective:   /54   Pulse 80   Temp 97.5  F (36.4  C)   Resp 18   Ht 1.727 m (5' 8\")   Wt 58.5 kg (129 lb)   SpO2 98%   BMI 19.61 kg/m    GENERAL APPEARANCE:  in no distress, appears healthy  ENT:  Mouth and posterior oropharynx normal, moist mucous membranes  RESP:  lungs clear to auscultation   CV:  no edema  ABDOMEN:  bowel sounds normal  M/S:   Gait and station abnormal unsteady gait  SKIN:  Inspection of skin and subcutaneous tissue baseline  NEURO:   Examination of sensation by touch normal  PSYCH:  affect and mood normal    Labs done in SNF are in [facility]. Please refer to them using [facility]/Care Everywhere.    Assessment/Plan:  (E46) Protein-calorie malnutrition, unspecified severity (H)  (primary encounter diagnosis)  Comment: BMI 19.61  Plan: dietary to " follow  -encourage intake    (N18.30) Stage 3 chronic kidney disease, unspecified whether stage 3a or 3b CKD (H)  Comment: labs 10/3 creat 1.84, GFR 35  Plan: chronic renal issues  -dose medications renally as possible  -periodic BMP's    (S43.005A) Dislocation of left shoulder joint, initial encounter  Comment: post fall, pain +  Plan:  Continue APAP PRN, may benefit from scheduling  -PT/OT working with  -continue sling   -follow up ortho on 10/7  -started metoprolol on hospital, SBP's now , discontinue metoprolol    (F03.90) Dementia without behavioral disturbance, unspecified dementia type  Comment: moderate cognitive limitations  Plan: OK to place wander guard   therapy to complete SLUMS testing, send results to NP  -staff to support as indicated  -Ellen, daughter looking into potential xfer to LTC due to care needs      Post Medication Reconciliation Status: Medication reconciliation previously completed during another office visit        Electronically signed by: REBECCA Pisano CNP

## 2022-10-06 NOTE — LETTER
"    10/6/2022        RE: Sandeep Tapia  [facility]  [address] [address]        [facility] GERIATR[facility]    Chief Complaint   Patient presents with     RECHECK     HPI:  Sandeep Tapia is a 87 year old  (10/27/1934), who is being seen today for an episodic care visit at: [facility] ([facility]) [[id number]]. Today's concern is:   1. Protein-calorie malnutrition, unspecified severity (H)    2. Stage 3 chronic kidney disease, unspecified whether stage 3a or 3b CKD (H)    3. Dislocation of left shoulder joint, initial encounter    4. Dementia without behavioral disturbance, unspecified dementia type      Patient seen for follow up, also called daughter Ellen RE: status and plan, resident with BMI 19.61, thin habitus, moderate cognitive limitations, fall on 10/3 with shoulder dislocation, in sling, neuro intact, pain+ with any movement, labs on 10/3 creat 1.84, GFR 35, overall pleasant, appears thin albeit healthy.  Of note, after cardiology appt was dropped off by transport, patient went back to [place] apartment vs returning to [facility] by habit.    Allergies, and PMH/PSH reviewed in EPIC today.  REVIEW OF SYSTEMS:  4 point ROS including Respiratory, CV, GI and , other than that noted in the HPI,  is negative    Objective:   /54   Pulse 80   Temp 97.5  F (36.4  C)   Resp 18   Ht 1.727 m (5' 8\")   Wt 58.5 kg (129 lb)   SpO2 98%   BMI 19.61 kg/m    GENERAL APPEARANCE:  in no distress, appears healthy  ENT:  Mouth and posterior oropharynx normal, moist mucous membranes  RESP:  lungs clear to auscultation   CV:  no edema  ABDOMEN:  bowel sounds normal  M/S:   Gait and station abnormal unsteady gait  SKIN:  Inspection of skin and subcutaneous tissue baseline  NEURO:   Examination of sensation by touch normal  PSYCH:  affect and mood normal    Labs done in SNF are in [facility]. Please refer to them using [employer]/Care Everywhere.    Assessment/Plan:  (E46) Protein-calorie " malnutrition, unspecified severity (H)  (primary encounter diagnosis)  Comment: BMI 19.61  Plan: dietary to follow  -encourage intake    (N18.30) Stage 3 chronic kidney disease, unspecified whether stage 3a or 3b CKD (H)  Comment: labs 10/3 creat 1.84, GFR 35  Plan: chronic renal issues  -dose medications renally as possible  -periodic BMP's    (S43.005A) Dislocation of left shoulder joint, initial encounter  Comment: post fall, pain +  Plan:  Continue APAP PRN, may benefit from scheduling  -PT/OT working with  -continue sling   -follow up ortho on 10/7  -started metoprolol on hospital, SBP's now , discontinue metoprolol    (F03.90) Dementia without behavioral disturbance, unspecified dementia type  Comment: moderate cognitive limitations  Plan: OK to place wander guard   therapy to complete SLUMS testing, send results to NP  -staff to support as indicated  -Ellen, daughter looking into potential xfer to LTC due to care needs      Post Medication Reconciliation Status: Medication reconciliation previously completed during another office visit        Electronically signed by: REBECCA Pisano CNP           Sincerely,        REBECCA Pisano CNP

## 2022-10-10 NOTE — PROGRESS NOTES
[facility] GERIATRICS    PRIMARY CARE PROVIDER AND CLINIC:  Kirby Fuentes, REBECCA CNP, [address] [facility] [address]  Chief Complaint   Patient presents with     Hospital F/U      [place] Medical Record Number:  [id number]  Place of Service where encounter took place:  [facility] (U)     Sandeep Tapia  is a 87 year old  (10/27/1934), admitted to the above facility from  [facility]. Hospital stay 9/22/22 through 9/28/22..     Hospital course was reviewed by me, is as per the hospital discharge summary and nurse practitioner note.    Pt  is a 87 year old male with PMH significant for dementia, CVA, chronic afib not on AC, CKD3, hypothyroidism, and BPH who was hospitalized  on 9/22/22 for the management of a  left shoulder dislocation and possible left scapholunate ligament tear after a fall.  Hospital course was complicated by tachy-ashish syndrome, necessitating a pacemaker placement.  Course was complicated by delirium and PARRIS on CKD    Course since admission to the TCU complicated by fall on 10/3 with resultant L shoulder dislocation.    Pt is unable to contribute to the history, secondary his cognitive impairment. He says he feels fine, wants to go home, is not able to describe recent events leading to hospitalization and subsequent ER visit    Pt was seen in ortho clinic 10/7/22-shoulder and wrist brace to be removed in approximately 1 week        CODE STATUS/ADVANCE DIRECTIVES DISCUSSION:  Full Code  DNR   ALLERGIES: No Known Allergies   PAST MEDICAL HISTORY: Dementia, afib not on AC CKD 3, chronic anemia,  PAST SURGICAL HISTORY:  Pacemaker placement  FAMILY HISTORY: not able to assess secondary to cognitive impairment  SOCIAL HISTORY:   reports that he has quit smoking. He has never used smokeless tobacco. He reports that he does not currently use alcohol.  Patient's living condition: lives in an assisted living facility    Medications were reviewed by me  "today:    Current Outpatient Medications   Medication Sig     ACE/ARB/ARNI NOT PRESCRIBED (INTENTIONAL) Please choose reason not prescribed from choices below.     acetaminophen (TYLENOL) 500 MG tablet Take 2 tablets (1,000 mg) by mouth every 8 hours as needed for mild pain     aspirin (ASA) 81 MG chewable tablet Take 1 tablet (81 mg) by mouth daily     atorvastatin (LIPITOR) 20 MG tablet Take 0.5 tablets (10 mg) by mouth daily     CARBOXYMETHYLCELLULOSE SODIUM OP Place 1 drop into both eyes every 6 hours as needed     citalopram (CELEXA) 20 MG tablet TAKE 1 TAB BY MOUTH ONCE DAILY FOR DEPRESSION     levothyroxine (SYNTHROID/LEVOTHROID) 25 MCG tablet Take 1 tablet (25 mcg) by mouth daily     melatonin 3 MG tablet Take 3 mg by mouth At Bedtime     polyethylene glycol (MIRALAX) 17 g packet Take 17 g by mouth daily     senna-docusate (SENOKOT-S/PERICOLACE) 8.6-50 MG tablet Take 1 tablet by mouth 2 times daily     tamsulosin (FLOMAX) 0.4 MG capsule Take 2 capsules (0.8 mg) by mouth every evening     No current facility-administered medications for this visit.       ROS:  Limited secondary to cognitive impairment but today pt reports no concerns    Vitals:  /54   Pulse 68   Temp 97.5  F (36.4  C)   Resp 18   Ht 1.727 m (5' 8\")   Wt 58.5 kg (129 lb)   SpO2 96%   BMI 19.61 kg/m    Exam:  Alert, well appearing male, sitting in wheelchair, wheeling self around unit  HEENT oral mucosa moist  Oral mucosa moist  Neck no adenopathy  Lungs clear  CV rrr   Pacemaker site was not examined by me  L arm in sling, L wrist in brace, + edema L hand  Strength intact L hand  No LE edema  Abd soft  Neuro: pleasant, oriented to person, place  Insight poor  Face symmetric        Lab/Diagnostic data was reviewed by me:  10/3/22; Hgb 9.3; Cr 1.84  Baseline Cr 1.3-1.6      ASSESSMENT/PLAN:    L UE injuries, following multiple falls, most recently L shoulder dislocation following fall while in facility  Plan continue sling, brace " for one more week, ROM per PT  Therapies  Fall precautions  Safe discharge, ? Back to AL    Dementia,  History of delirium in setting of recent injuries, hospitalization  Pt is pleasant, confused, suspect at or close to baseline  Plan monitor mental and functional status  Avoid CNS active medications  Assure safe discharg    AFib  SSS  S/p pacemaker placement  CV status stable  Plan cardiology f/u, continue Metoprolol, ASA, statin    CKD 3, with recent PARRIS  On no nephrotoxic medications  Plan encourage po intake, monitor BMP    Anemia  Chronic, likely secondary to CKD  Plan monitor Hgb, outpt f/u      Brian Ruiz MD

## 2022-10-17 NOTE — PROGRESS NOTES
"[facility] GERIATRICS    Chief Complaint   Patient presents with     RECHECK     HPI:  Sandeep Tapia is a 87 year old  (10/27/1934), who is being seen today for an episodic care visit at: [facility] ([facility]) [[id number]]. Today's concern is:   1. Atrial fibrillation with RVR (H)    2. Cardiac pacemaker in situ    3. Recurrent falls    4. Dislocation of left shoulder joint, initial encounter      Patient seen for follow up, denies CP/palpitations, today RRR, removed dressing on pacer with skin approximated, states feeling well, post fall with L shoulder dislocation, having L arm pain but ROM intact, overall appears thin, frail, no distress.    Allergies, and PMH/PSH reviewed in EPIC today.  REVIEW OF SYSTEMS:  4 point ROS including Respiratory, CV, GI and , other than that noted in the HPI,  is negative    Objective:   /56   Pulse 62   Temp 97.7  F (36.5  C)   Resp 16   Ht 1.702 m (5' 7\")   Wt 58.5 kg (129 lb)   SpO2 97%   BMI 20.20 kg/m    GENERAL APPEARANCE:  in no distress, thin  ENT:  Mouth and posterior oropharynx normal, moist mucous membranes  RESP:  lungs clear to auscultation   CV:  no edema, rate-normal  ABDOMEN:  bowel sounds normal  M/S:   Gait and station abnormal transfer assist  SKIN:  Inspection of skin and subcutaneous tissue baseline  NEURO:   Examination of sensation by touch normal  PSYCH:  affect and mood normal    Labs done in SNF are in [facility]. Please refer to them using [facility]/Care Everywhere.    Assessment/Plan:  (I48.91) Atrial fibrillation with RVR (H)  (primary encounter diagnosis)  (Z95.0) Cardiac pacemaker in situ  Comment: no CP/palpitations, RRR  Plan: continue ASA81, statin (metoprolol DC'd with hypotension)  -follow up cardiology PRN    (R29.6) Recurrent falls  (S43.005A) Dislocation of left shoulder joint, initial encounter  Comment: weak, limited safety awareness  Plan: use sling PRN  -change tylenol to TID scheduled  -consider XR if not " improving  -plan to not start opioids due to confusion        Post Medication Reconciliation Status: Medication reconciliation previously completed during another office visit        Electronically signed by: REBECCA Pisano CNP

## 2022-10-17 NOTE — LETTER
"    10/17/2022        RE: Sandeep Tapia  [facility]  [address] [address]        [facility] GERIATRICS    Chief Complaint   Patient presents with     RECHECK     HPI:  Sandeep Tapia is a 87 year old  (10/27/1934), who is being seen today for an episodic care visit at: [facility] ([facility]) [[id number]]. Today's concern is:   1. Atrial fibrillation with RVR (H)    2. Cardiac pacemaker in situ    3. Recurrent falls    4. Dislocation of left shoulder joint, initial encounter      Patient seen for follow up, denies CP/palpitations, today RRR, removed dressing on pacer with skin approximated, states feeling well, post fall with L shoulder dislocation, having L arm pain but ROM intact, overall appears thin, frail, no distress.    Allergies, and PMH/PSH reviewed in EPIC today.  REVIEW OF SYSTEMS:  4 point ROS including Respiratory, CV, GI and , other than that noted in the HPI,  is negative    Objective:   /56   Pulse 62   Temp 97.7  F (36.5  C)   Resp 16   Ht 1.702 m (5' 7\")   Wt 58.5 kg (129 lb)   SpO2 97%   BMI 20.20 kg/m    GENERAL APPEARANCE:  in no distress, thin  ENT:  Mouth and posterior oropharynx normal, moist mucous membranes  RESP:  lungs clear to auscultation   CV:  no edema, rate-normal  ABDOMEN:  bowel sounds normal  M/S:   Gait and station abnormal transfer assist  SKIN:  Inspection of skin and subcutaneous tissue baseline  NEURO:   Examination of sensation by touch normal  PSYCH:  affect and mood normal    Labs done in SNF are in [facility]. Please refer to them using [facility]/Care Everywhere.    Assessment/Plan:  (I48.91) Atrial fibrillation with RVR (H)  (primary encounter diagnosis)  (Z95.0) Cardiac pacemaker in situ  Comment: no CP/palpitations, RRR  Plan: continue ASA81, statin (metoprolol DC'd with hypotension)  -follow up cardiology PRN    (R29.6) Recurrent falls  (S43.005A) Dislocation of left shoulder joint, initial " encounter  Comment: weak, limited safety awareness  Plan: use sling PRN  -change tylenol to TID scheduled  -consider XR if not improving  -plan to not start opioids due to confusion        Post Medication Reconciliation Status: Medication reconciliation previously completed during another office visit        Electronically signed by: REBECCA Pisano CNP           Sincerely,        REBECCA Pisano CNP

## 2022-10-20 PROBLEM — I49.5 TACHY-BRADY SYNDROME (H): Status: ACTIVE | Noted: 2022-01-01

## 2022-10-20 NOTE — PROGRESS NOTES
"[facility] GERIATRICS    Chief Complaint   Patient presents with     RECHECK     HPI:  Sandeep Tapia is a 87 year old  (10/27/1934), who is being seen today for an episodic care visit at: [facility] ([facility]) [[id number]]. Today's concern is:   1. Dementia without behavioral disturbance, unspecified dementia type    2. Tachy-ashish syndrome (H)    3. Cardiac pacemaker in situ    4. Dislocation of left shoulder joint, initial encounter      Patient seen for follow up, also met with daughter Ellen RE: status and plan, post hospital for fall and L shoulder dislocation, bradycardia during stay and pacemaker placed, today SLUMs 12/30, repetitive and confused with memory loss, VS WNL, pacer incision glued and approximated, continued with L shoulder and arm pain, limiting use, overall appears healthy albeit thin, no distress.    Allergies, and PMH/PSH reviewed in EPIC today.  REVIEW OF SYSTEMS:  4 point ROS including Respiratory, CV, GI and , other than that noted in the HPI,  is negative    Objective:   /55   Pulse 61   Temp 97.7  F (36.5  C)   Resp 16   Ht 1.702 m (5' 7\")   Wt 58.5 kg (129 lb)   SpO2 98%   BMI 20.20 kg/m    GENERAL APPEARANCE:  in no distress, appears healthy  ENT:  Mouth and posterior oropharynx normal, moist mucous membranes  RESP:  lungs clear to auscultation   CV:  no edema  ABDOMEN:  bowel sounds normal  M/S:   Gait and station abnormal transfer assist  SKIN:  Inspection of skin and subcutaneous tissue baseline  NEURO:   Examination of sensation by touch normal  PSYCH:  affect and mood normal    Labs done in SNF are in [facility]. Please refer to them using [facility]/Care Everywhere.    Assessment/Plan:  (F03.90) Dementia without behavioral disturbance, unspecified dementia type  (primary encounter diagnosis)  Comment: SLUMs 12/30, moderate cognitive limitations  Plan: care conference today  -considering xfer to LTC versus back to AL apartment due to care " needs  -discontinue melatonin; sleeping well  -ST eval, mild dysphagia  -Hgb, BMP on 10/24 for follow up  -staff to support as indicated    (I49.5) Tachy-ashish syndrome (H)  (Z95.0) Cardiac pacemaker in situ  Comment: pacer placed 9/27, tolerated well, does not feel different, metoprolol DC'd for low BP's  Plan: staff to check VS daily  -follow up cardiology PRN    (S43.005A) Dislocation of left shoulder joint, initial encounter  Comment: pain with movement, limiting use  Plan: APAP TID scheduled    MED REC REQUIRED  Post Medication Reconciliation Status: discharge medications reconciled and changed, per note/orders        Electronically signed by: REBECCA Pisano CNP

## 2022-10-20 NOTE — LETTER
"    10/20/2022        RE: Sandeep Tapia  [facility]  [address] [address]        [facility] GERIATRICS    Chief Complaint   Patient presents with     RECHECK     HPI:  Sandeep Tapia is a 87 year old  (10/27/1934), who is being seen today for an episodic care visit at: [facility] ([facility]) [[id number]]. Today's concern is:   1. Dementia without behavioral disturbance, unspecified dementia type    2. Tachy-ashish syndrome (H)    3. Cardiac pacemaker in situ    4. Dislocation of left shoulder joint, initial encounter      Patient seen for follow up, also met with daughter Ellen RE: status and plan, post hospital for fall and L shoulder dislocation, bradycardia during stay and pacemaker placed, today SLUMs 12/30, repetitive and confused with memory loss, VS WNL, pacer incision glued and approximated, continued with L shoulder and arm pain, limiting use, overall appears healthy albeit thin, no distress.    Allergies, and PMH/PSH reviewed in EPIC today.  REVIEW OF SYSTEMS:  4 point ROS including Respiratory, CV, GI and , other than that noted in the HPI,  is negative    Objective:   /55   Pulse 61   Temp 97.7  F (36.5  C)   Resp 16   Ht 1.702 m (5' 7\")   Wt 58.5 kg (129 lb)   SpO2 98%   BMI 20.20 kg/m    GENERAL APPEARANCE:  in no distress, appears healthy  ENT:  Mouth and posterior oropharynx normal, moist mucous membranes  RESP:  lungs clear to auscultation   CV:  no edema  ABDOMEN:  bowel sounds normal  M/S:   Gait and station abnormal transfer assist  SKIN:  Inspection of skin and subcutaneous tissue baseline  NEURO:   Examination of sensation by touch normal  PSYCH:  affect and mood normal    Labs done in SNF are in [facility]. Please refer to them using EPIC/Care Everywhere.    Assessment/Plan:  (F03.90) Dementia without behavioral disturbance, unspecified dementia type  (primary encounter diagnosis)  Comment: SLUMs 12/30, moderate cognitive " limitations  Plan: care conference today  -considering xfer to LTC versus back to AL apartment due to care needs  -discontinue melatonin; sleeping well  -ST eval, mild dysphagia  -Hgb, BMP on 10/24 for follow up  -staff to support as indicated    (I49.5) Tachy-ashish syndrome (H)  (Z95.0) Cardiac pacemaker in situ  Comment: pacer placed 9/27, tolerated well, does not feel different, metoprolol DC'd for low BP's  Plan: staff to check VS daily  -follow up cardiology PRN    (S43.005A) Dislocation of left shoulder joint, initial encounter  Comment: pain with movement, limiting use  Plan: APAP TID scheduled    MED REC REQUIRED  Post Medication Reconciliation Status: discharge medications reconciled and changed, per note/orders        Electronically signed by: REBECCA Pisano CNP           Sincerely,        REBECCA Pisano CNP

## 2022-10-24 NOTE — LETTER
"    10/24/2022        RE: Sandeep Tapia  [facility]  [address] [address]        [facility] GERIATRICS    Chief Complaint   Patient presents with     RECHECK     HPI:  Sandeep Tapia is a 87 year old  (10/27/1934), who is being seen today for an episodic care visit at: [facility] ([facility]) [[id number]]. Today's concern is:   1. Dementia without behavioral disturbance, unspecified dementia type    2. Recurrent falls    3. Stage 3 chronic kidney disease, unspecified whether stage 3a or 3b CKD (H)      Patient seen for follow up, [facility] 12/30, post fall with hospital visit and pacemaker placement, semi-nonsensical conversation, appears healthy albeit thin/frail, has 2 more falls over weekend without injury, no recall of falls, labs on 10/3 creat 1.84, GFR 35, no distress.    Allergies, and PMH/PSH reviewed in EPIC today.  REVIEW OF SYSTEMS:  4 point ROS including Respiratory, CV, GI and , other than that noted in the HPI,  is negative    Objective:   BP 95/55   Pulse 81   Temp 97.8  F (36.6  C)   Resp 20   Ht 1.702 m (5' 7\")   Wt 59.1 kg (130 lb 3.2 oz)   SpO2 98%   BMI 20.39 kg/m    GENERAL APPEARANCE:  in no distress, appears healthy  ENT:  Mouth and posterior oropharynx normal, moist mucous membranes  RESP:  lungs clear to auscultation   CV:  no edema  ABDOMEN:  bowel sounds normal  M/S:   Gait and station abnormal WC mobility  SKIN:  Inspection of skin and subcutaneous tissue baseline  NEURO:   Examination of sensation by touch normal  PSYCH:  affect and mood normal    Labs done in SNF are in [facility]. Please refer to them using [facility]/Care Everywhere.    Assessment/Plan:  (F03.90) Dementia without behavioral disturbance, unspecified dementia type  (primary encounter diagnosis)  (R29.6) Recurrent falls  Comment: moderate cognitive limitations, limited safety awareness, [facility] 12/30  Plan: therapies to continue  -making plans to transfer to LTC vs back to " AL apartment due to care needs  -staff to support as indicated  -encouraged to use call light    (N18.30) Stage 3 chronic kidney disease, unspecified whether stage 3a or 3b CKD (H)  Comment: lab 10/3 creat 1.84, GFR 35  Plan: does medications renally as possible  -BMP today 10/24 pending (with HGB)    MED REC REQUIRED  Post Medication Reconciliation Status: medication reconcilation previously completed during another office visit        Electronically signed by: REBECCA Pisano CNP           Sincerely,        REBECCA Pisano CNP

## 2022-10-24 NOTE — PROGRESS NOTES
"[facility] GERIATRICS    Chief Complaint   Patient presents with     RECHECK     HPI:  Sandeep Tapia is a 87 year old  (10/27/1934), who is being seen today for an episodic care visit at: [facility] ([facility]) [[id number]]. Today's concern is:   1. Dementia without behavioral disturbance, unspecified dementia type    2. Recurrent falls    3. Stage 3 chronic kidney disease, unspecified whether stage 3a or 3b CKD (H)      Patient seen for follow up, [facility] 12/30, post fall with hospital visit and pacemaker placement, semi-nonsensical conversation, appears healthy albeit thin/frail, has 2 more falls over weekend without injury, no recall of falls, labs on 10/3 creat 1.84, GFR 35, no distress.    Allergies, and PMH/PSH reviewed in EPIC today.  REVIEW OF SYSTEMS:  4 point ROS including Respiratory, CV, GI and , other than that noted in the HPI,  is negative    Objective:   BP 95/55   Pulse 81   Temp 97.8  F (36.6  C)   Resp 20   Ht 1.702 m (5' 7\")   Wt 59.1 kg (130 lb 3.2 oz)   SpO2 98%   BMI 20.39 kg/m    GENERAL APPEARANCE:  in no distress, appears healthy  ENT:  Mouth and posterior oropharynx normal, moist mucous membranes  RESP:  lungs clear to auscultation   CV:  no edema  ABDOMEN:  bowel sounds normal  M/S:   Gait and station abnormal WC mobility  SKIN:  Inspection of skin and subcutaneous tissue baseline  NEURO:   Examination of sensation by touch normal  PSYCH:  affect and mood normal    Labs done in SNF are in [facility]. Please refer to them using [facility]/Care Everywhere.    Assessment/Plan:  (F03.90) Dementia without behavioral disturbance, unspecified dementia type  (primary encounter diagnosis)  (R29.6) Recurrent falls  Comment: moderate cognitive limitations, limited safety awareness, SLUMS 12/30  Plan: therapies to continue  -making plans to transfer to LTC vs back to AL apartment due to care needs  -staff to support as indicated  -encouraged to use call light    (N18.30) Stage 3 " chronic kidney disease, unspecified whether stage 3a or 3b CKD (H)  Comment: lab 10/3 creat 1.84, GFR 35  Plan: does medications renally as possible  -BMP today 10/24 pending (with HGB)    MED REC REQUIRED  Post Medication Reconciliation Status: medication reconcilation previously completed during another office visit        Electronically signed by: REBECCA Pisano CNP

## 2022-10-27 NOTE — LETTER
"    10/27/2022        RE: Sandeep Tapia  [facility]  [address] [address]        [facility] GERIATRICS    Chief Complaint   Patient presents with     RECHECK     HPI:  Sandeep Tapia is a 88 year old  (10/27/1934), who is being seen today for an episodic care visit at: [facility] ([facility]) [[id number]]. Today's concern is:   1. Tachy-ashish syndrome (H)    2. Cardiac pacemaker in situ    3. Dementia without behavioral disturbance, unspecified dementia type      Patient seen for follow up, moderate cognitive limitations, SLUMS 12/30, wants to return to AL apartment, difficult to explain why this would not be beneficial, continues therapy, pacer incision approximated, denies CP/palpitations, states feeling fine, no distress.    Allergies, and PMH/PSH reviewed in EPIC today.  REVIEW OF SYSTEMS:  4 point ROS including Respiratory, CV, GI and , other than that noted in the HPI,  is negative    Objective:   /57   Pulse 59   Temp 97.6  F (36.4  C)   Resp 18   Ht 1.702 m (5' 7\")   Wt 59.1 kg (130 lb 3.2 oz)   SpO2 95%   BMI 20.39 kg/m    GENERAL APPEARANCE:  in no distress, appears healthy  ENT:  Mouth and posterior oropharynx normal, moist mucous membranes  RESP:  lungs clear to auscultation   CV:  no edema  ABDOMEN:  bowel sounds normal  M/S:   Gait and station abnormal unsteady gait  SKIN:  Inspection of skin and subcutaneous tissue baseline  NEURO:   Examination of sensation by touch normal  PSYCH:  affect and mood normal    Labs done in SNF are in [facility]. Please refer to them using [facility]/Care Everywhere.    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)  (primary encounter diagnosis)  (Z95.0) Cardiac pacemaker in situ  Comment: post pacer placement, HR's 60+  Plan: continue ASA and statin  -CBC, BMP, TSH on 10/31    (F03.90) Dementia without behavioral disturbance, unspecified dementia type  Comment: SLUMS 12/30  Plan: pending alternative " placement after TCU stay due to care needs  -staff to support as indicated  -continue celexa as mood stabilizer    MED REC REQUIRED  Post Medication Reconciliation Status: medication reconcilation previously completed during another office visit        Electronically signed by: REBECCA Pisano CNP           Sincerely,        REBECCA Pisano CNP

## 2022-10-27 NOTE — PROGRESS NOTES
"[facility] GERIATRICS    Chief Complaint   Patient presents with     RECHECK     HPI:  Sandeep Tapia is a 88 year old  (10/27/1934), who is being seen today for an episodic care visit at: [facility] ([facility]) [[id number]]. Today's concern is:   1. Tachy-ashish syndrome (H)    2. Cardiac pacemaker in situ    3. Dementia without behavioral disturbance, unspecified dementia type      Patient seen for follow up, moderate cognitive limitations, SLUMS 12/30, wants to return to AL apartment, difficult to explain why this would not be beneficial, continues therapy, pacer incision approximated, denies CP/palpitations, states feeling fine, no distress.    Allergies, and PMH/PSH reviewed in EPIC today.  REVIEW OF SYSTEMS:  4 point ROS including Respiratory, CV, GI and , other than that noted in the HPI,  is negative    Objective:   /57   Pulse 59   Temp 97.6  F (36.4  C)   Resp 18   Ht 1.702 m (5' 7\")   Wt 59.1 kg (130 lb 3.2 oz)   SpO2 95%   BMI 20.39 kg/m    GENERAL APPEARANCE:  in no distress, appears healthy  ENT:  Mouth and posterior oropharynx normal, moist mucous membranes  RESP:  lungs clear to auscultation   CV:  no edema  ABDOMEN:  bowel sounds normal  M/S:   Gait and station abnormal unsteady gait  SKIN:  Inspection of skin and subcutaneous tissue baseline  NEURO:   Examination of sensation by touch normal  PSYCH:  affect and mood normal    Labs done in SNF are in [facility]. Please refer to them using [facility]/Care Everywhere.    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

## 2022-10-31 NOTE — LETTER
"    10/31/2022        RE: Sandeep Tapia  [facility]  [address] [address]        [facility] GERIATRICS    Chief Complaint   Patient presents with     RECHECK     HPI:  Sandeep Tapia is a 88 year old  (10/27/1934), who is being seen today for an episodic care visit at: [facility] (U) [[id number]]. Today's concern is:   1. Tachy-ashish syndrome (H)    2. Cardiac pacemaker in situ    3. Dementia without behavioral disturbance, unspecified dementia type      Patient seen for follow up, progressive dementia, moderate confusion, in TCU post pacer placement and deconditioning, thin habitus, good appetite, appears healthy, denies CP/palpitations, probable discharge soon but unable to return to AL apartment due to care needs, no distress.     Allergies, and PMH/PSH reviewed in EPIC today.  REVIEW OF SYSTEMS:  4 point ROS including Respiratory, CV, GI and , other than that noted in the HPI,  is negative    Objective:   /50   Pulse 64   Temp 98.3  F (36.8  C)   Resp 16   Ht 1.702 m (5' 7\")   Wt 59 kg (130 lb 1.6 oz)   SpO2 99%   BMI 20.38 kg/m    GENERAL APPEARANCE:  in no distress, appears healthy  ENT:  Mouth and posterior oropharynx normal, moist mucous membranes  RESP:  lungs clear to auscultation   CV:  no edema  ABDOMEN:  bowel sounds normal  M/S:   Gait and station abnormal transfer assist  SKIN:  Inspection of skin and subcutaneous tissue baseline  NEURO:   Examination of sensation by touch normal  PSYCH:  affect and mood normal    Labs done in SNF are in [facility]. Please refer to them using [facility]/Care Everywhere.    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)  (primary encounter diagnosis)  (Z95.0) Cardiac pacemaker in situ  Comment: incision approximated, no CP  Plan: follow up cardiology PRN  -Hgb, BMP, TSH on 10/31; results pending  -probable transfer to LTC after TCU stay, SW working on plan with family     (F03.90) Dementia without " behavioral disturbance, unspecified dementia type  Comment: Tesfaye 12/30  Plan: staff to support as indicated  -continue celexa 20mg as mood stabilizer    MED REC REQUIRED  Post Medication Reconciliation Status: medication reconcilation previously completed during another office visit      Electronically signed by: REBECCA Pisano CNP           Sincerely,        REBECCA Pisano CNP

## 2022-10-31 NOTE — PROGRESS NOTES
"[facility] GERIATRICS    Chief Complaint   Patient presents with     RECHECK     HPI:  Sandeep Tapia is a 88 year old  (10/27/1934), who is being seen today for an episodic care visit at: [facility] ([facility]) [[id number]]. Today's concern is:   1. Tachy-ashish syndrome (H)    2. Cardiac pacemaker in situ    3. Dementia without behavioral disturbance, unspecified dementia type      Patient seen for follow up, progressive dementia, moderate confusion, in TCU post pacer placement and deconditioning, thin habitus, good appetite, appears healthy, denies CP/palpitations, probable discharge soon but unable to return to AL apartment due to care needs, no distress.     Allergies, and PMH/PSH reviewed in EPIC today.  REVIEW OF SYSTEMS:  4 point ROS including Respiratory, CV, GI and , other than that noted in the HPI,  is negative    Objective:   /50   Pulse 64   Temp 98.3  F (36.8  C)   Resp 16   Ht 1.702 m (5' 7\")   Wt 59 kg (130 lb 1.6 oz)   SpO2 99%   BMI 20.38 kg/m    GENERAL APPEARANCE:  in no distress, appears healthy  ENT:  Mouth and posterior oropharynx normal, moist mucous membranes  RESP:  lungs clear to auscultation   CV:  no edema  ABDOMEN:  bowel sounds normal  M/S:   Gait and station abnormal transfer assist  SKIN:  Inspection of skin and subcutaneous tissue baseline  NEURO:   Examination of sensation by touch normal  PSYCH:  affect and mood normal    Labs done in SNF are in [facility]. Please refer to them using [facility]/Care Everywhere.    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)  (primary encounter diagnosis)  (Z95.0) Cardiac pacemaker in situ  Comment: incision approximated, no CP  Plan: follow up cardiology PRN  -Hgb, BMP, TSH on 10/31; results pending  -probable transfer to LTC after TCU stay, SW working on plan with family     (F03.90) Dementia without behavioral disturbance, unspecified dementia type  Comment: SLUMs 12/30  Plan: staff to support as indicated  -continue " celexa 20mg as mood stabilizer    MED REC REQUIRED  Post Medication Reconciliation Status: medication reconcilation previously completed during another office visit      Electronically signed by: REBECCA Pisano CNP

## 2022-11-03 NOTE — LETTER
"    11/3/2022        RE: Sandeep Tapia  [facility]  [address] [address]        [facility] GERIATRICS    Chief Complaint   Patient presents with     RECHECK     HPI:  Sandeep Tapia is a 88 year old  (10/27/1934), who is being seen today for an episodic care visit at: [facility] (TC) [[id number]]. Today's concern is:   1. Atrial fibrillation with RVR (H)    2. Dislocation of left shoulder joint, subsequent encounter    3. Dementia without behavioral disturbance, unspecified dementia type      Patient seen for follow up with therapy, HR RRR at 60, new pacer placement, incision approximated, L shoulder previously unable to use due to pain now ROM approx 40%, able to lift up to shoulder, moderate pain limiting movement, moderate cognitive limitations with SLUMs 12/30, very pleasant, considering pending LTC placement after [facility] stay.    Allergies, and PMH/PSH reviewed in EPIC today.  REVIEW OF SYSTEMS:  4 point ROS including Respiratory, CV, GI and , other than that noted in the HPI,  is negative    Objective:   BP (!) 142/64   Pulse 63   Temp 97.8  F (36.6  C)   Resp 16   Ht 1.702 m (5' 7\")   Wt 59 kg (130 lb 1.6 oz)   SpO2 96%   BMI 20.38 kg/m    GENERAL APPEARANCE:  in no distress, appears healthy, thin  ENT:  Mouth and posterior oropharynx normal, moist mucous membranes  RESP:  lungs clear to auscultation   CV:  no edema  ABDOMEN:  bowel sounds normal  M/S:   Gait and station abnormal transfer assist  SKIN:  Inspection of skin and subcutaneous tissue baseline  NEURO:   Examination of sensation by touch normal  PSYCH:  affect and mood normal    Labs done in SNF are in [facility]. Please refer to them using [employer]/Care Everywhere.    Assessment/Plan:  (I48.91) Atrial fibrillation with RVR (H)  (primary encounter diagnosis)  Comment: RRR, pacer placed  Plan: continue ASA, statin  -follow up cardiology PRN    (S43.005D) Dislocation of left shoulder joint, " subsequent encounter  Comment: post fall, previously unable to use  Plan:  -therapies to continue   -continue APAP TID scheduled  -if pain not well controlled consider topical first      (F03.90) Dementia without behavioral disturbance, unspecified dementia type  Comment: SLUMS 12/30, pleasant  Plan: staff to support as indicated  -high fall risk, impulsive with limited safety awareness  -plan to discontinue ASA if falling more frequently     MED REC REQUIRED  Post Medication Reconciliation Status: medication reconcilation previously completed during another office visit        Electronically signed by: REBECCA Pisano CNP             Sincerely,        REBECCA Pisano CNP

## 2022-11-03 NOTE — PROGRESS NOTES
"[facility] GERIATRICS    Chief Complaint   Patient presents with     RECHECK     HPI:  Sandeep Tapia is a 88 year old  (10/27/1934), who is being seen today for an episodic care visit at: [facility] ([facility]) [[id number]]. Today's concern is:   1. Atrial fibrillation with RVR (H)    2. Dislocation of left shoulder joint, subsequent encounter    3. Dementia without behavioral disturbance, unspecified dementia type      Patient seen for follow up with therapy, HR RRR at 60, new pacer placement, incision approximated, L shoulder previously unable to use due to pain now ROM approx 40%, able to lift up to shoulder, moderate pain limiting movement, moderate cognitive limitations with SLUMs 12/30, very pleasant, considering pending LTC placement after [facility] stay.    Allergies, and PMH/PSH reviewed in EPIC today.  REVIEW OF SYSTEMS:  4 point ROS including Respiratory, CV, GI and , other than that noted in the HPI,  is negative    Objective:   BP (!) 142/64   Pulse 63   Temp 97.8  F (36.6  C)   Resp 16   Ht 1.702 m (5' 7\")   Wt 59 kg (130 lb 1.6 oz)   SpO2 96%   BMI 20.38 kg/m    GENERAL APPEARANCE:  in no distress, appears healthy, thin  ENT:  Mouth and posterior oropharynx normal, moist mucous membranes  RESP:  lungs clear to auscultation   CV:  no edema  ABDOMEN:  bowel sounds normal  M/S:   Gait and station abnormal transfer assist  SKIN:  Inspection of skin and subcutaneous tissue baseline  NEURO:   Examination of sensation by touch normal  PSYCH:  affect and mood normal    Labs done in SNF are in [facility]. Please refer to them using [employer]/Care Everywhere.    Assessment/Plan:  (I48.91) Atrial fibrillation with RVR (H)  (primary encounter diagnosis)  Comment: RRR, pacer placed  Plan: continue ASA, statin  -follow up cardiology PRN    (S43.005D) Dislocation of left shoulder joint, subsequent encounter  Comment: post fall, previously unable to use  Plan:  -therapies to continue   -continue APAP TID " scheduled  -if pain not well controlled consider topical first      (F03.90) Dementia without behavioral disturbance, unspecified dementia type  Comment: SLUMS 12/30, pleasant  Plan: staff to support as indicated  -high fall risk, impulsive with limited safety awareness  -plan to discontinue ASA if falling more frequently     MED REC REQUIRED  Post Medication Reconciliation Status: medication reconcilation previously completed during another office visit        Electronically signed by: REBECCA Pisano CNP

## 2022-11-07 NOTE — PROGRESS NOTES
"[facility] GERIATRICS    Chief Complaint   Patient presents with     RECHECK     HPI:  Sandeep Tapia is a 88 year old  (10/27/1934), who is being seen today for an episodic care visit at: [facility] ([facility]) [[id number]]. Today's concern is:   1. Dementia without behavioral disturbance (H)    2. Unresponsive episode    3. Hypotension due to hypovolemia    4. Benign prostatic hyperplasia without lower urinary tract symptoms      Patient seen today for follow up, post hospital stay for fall, L shoulder dislocation and pacer placement, also updated daughter Emily RE: status and plan, having therapy today, placed on toilet, small soft BM, then went unresponsive, nursing and myself called in, still on toilet, eyes half open, rigidity whole body, unable to move head up for neuro, sternal rub no response, in approx 3 minutes from onset started mumbling, then moving legs/arms, within 10 minutes close to baseline and taken off toilet, after 15 minutes sitting in chair eating breakfast and baseline, neuro intact, ROM intact, during episode sats dropped into 80's, BP 84/53, 99/53, 97.8, HR , considering TIA most likely, declines wanting ER services.      Allergies, and PMH/PSH reviewed in [facility] today.  REVIEW OF SYSTEMS:  4 point ROS including Respiratory, CV, GI and , other than that noted in the HPI,  is negative    Objective:   /56   Pulse 64   Temp 97.7  F (36.5  C)   Resp 18   Ht 1.702 m (5' 7\")   Wt 59.4 kg (131 lb)   SpO2 97%   BMI 20.52 kg/m    GENERAL APPEARANCE:  in no distress, appears healthy  ENT:  Mouth and posterior oropharynx normal, moist mucous membranes  RESP:  lungs clear to auscultation   CV:  no edema  ABDOMEN:  bowel sounds normal  M/S:   Gait and station abnormal transfer assist  SKIN:  Inspection of skin and subcutaneous tissue baseline  NEURO:   Examination of sensation by touch normal  PSYCH:  affect and mood normal    Labs done in SNF are in [facility]. " Please refer to them using [employer]/Care Everywhere.    Assessment/Plan:  (F03.90) Dementia without behavioral disturbance (H)  (primary encounter diagnosis)  (R41.89) Unresponsive episode  (I95.89,  E86.1) Hypotension due to hypovolemia  (N40.0) Benign prostatic hyperplasia without lower urinary tract symptoms  Comment: SLUMS 12/30, unresponsive 11/7 from 0715 to 0730, low BP's, no issues with B&B, possible TIA or vaso-vagal response   Plan:   -Hgb, BMP on 11/7  -decrease flomax to 0.4mg Qpm  -start O2 1-4L PRN for sats <90%  -family/patient decline needing ER services  -therapies to continue  -probable discharge to LTC after TCU stay due to care needs    MED REC REQUIRED  Post Medication Reconciliation Status: medication reconcilation previously completed during another office visit        Electronically signed by: REBECCA Pisano CNP

## 2022-11-07 NOTE — LETTER
"    11/7/2022        RE: Sandeep Tapia  [facility]  [address] [address]        [facility] GERIATRICS    Chief Complaint   Patient presents with     RECHECK     HPI:  Sandeep Tapia is a 88 year old  (10/27/1934), who is being seen today for an episodic care visit at: [facility] (TCU) [[id number]]. Today's concern is:   1. Dementia without behavioral disturbance (H)    2. Unresponsive episode    3. Hypotension due to hypovolemia    4. Benign prostatic hyperplasia without lower urinary tract symptoms      Patient seen today for follow up, post hospital stay for fall, L shoulder dislocation and pacer placement, also updated daughter Emily RE: status and plan, having therapy today, placed on toilet, small soft BM, then went unresponsive, nursing and myself called in, still on toilet, eyes half open, rigidity whole body, unable to move head up for neuro, sternal rub no response, in approx 3 minutes from onset started mumbling, then moving legs/arms, within 10 minutes close to baseline and taken off toilet, after 15 minutes sitting in chair eating breakfast and baseline, neuro intact, ROM intact, during episode sats dropped into 80's, BP 84/53, 99/53, 97.8, HR , considering TIA most likely, declines wanting ER services.      Allergies, and PMH/PSH reviewed in [facility] today.  REVIEW OF SYSTEMS:  4 point ROS including Respiratory, CV, GI and , other than that noted in the HPI,  is negative    Objective:   /56   Pulse 64   Temp 97.7  F (36.5  C)   Resp 18   Ht 1.702 m (5' 7\")   Wt 59.4 kg (131 lb)   SpO2 97%   BMI 20.52 kg/m    GENERAL APPEARANCE:  in no distress, appears healthy  ENT:  Mouth and posterior oropharynx normal, moist mucous membranes  RESP:  lungs clear to auscultation   CV:  no edema  ABDOMEN:  bowel sounds normal  M/S:   Gait and station abnormal transfer assist  SKIN:  Inspection of skin and subcutaneous tissue baseline  NEURO:   " Examination of sensation by touch normal  PSYCH:  affect and mood normal    Labs done in SNF are in [place] EPIC. Please refer to them using [employer]/Care Everywhere.    Assessment/Plan:  (F03.90) Dementia without behavioral disturbance (H)  (primary encounter diagnosis)  (R41.89) Unresponsive episode  (I95.89,  E86.1) Hypotension due to hypovolemia  (N40.0) Benign prostatic hyperplasia without lower urinary tract symptoms  Comment: SLUMS 12/30, unresponsive 11/7 from 0715 to 0730, low BP's, no issues with B&B, possible TIA or vaso-vagal response   Plan:   -Hgb, BMP on 11/7  -decrease flomax to 0.4mg Qpm  -start O2 1-4L PRN for sats <90%  -family/patient decline needing ER services  -therapies to continue  -probable discharge to LTC after TCU stay due to care needs    MED REC REQUIRED  Post Medication Reconciliation Status: medication reconcilation previously completed during another office visit        Electronically signed by: REBECCA Pisano CNP           Sincerely,        REBECCA Pisano CNP

## 2022-11-10 NOTE — LETTER
11/10/2022        RE: Sandeep Tapia  [facility]  [address] [address]        [facility] GERIATRICS    Chief Complaint   Patient presents with     RECHECK     HPI:  Sandeep Tapia is a 88 year old  (10/27/1934), who is being seen today for an episodic care visit at: [facility] ([facility]) [[id number]]. Today's concern is:   1. Tachy-ashish syndrome (H)    2. Cardiac pacemaker in situ    3. Dislocation of left shoulder joint, initial encounter      Patient seen today for follow up, also called daughter RE: status and plan, moderate dementia and tells everyone that he is returning to AL apartment but actually is going to transfer to LTC after therapy due to care needs, recent fall with L shoulder dislocation, found hypotensive, pacemaker placed, is able to raise L arm now and use for walker and meals, VS and labs WNL, no distress.    Allergies, and PMH/PSH reviewed in EPIC today.  REVIEW OF SYSTEMS:  4 point ROS including Respiratory, CV, GI and , other than that noted in the HPI,  is negative    Objective:   /67   Pulse 70   Temp 97.7  F (36.5  C)   Resp 18   Wt 59.4 kg (131 lb)   SpO2 95%   BMI 20.52 kg/m    GENERAL APPEARANCE:  in no distress, appears healthy  ENT:  Mouth and posterior oropharynx normal, moist mucous membranes  RESP:  lungs clear to auscultation   CV:  regular rate and rhythm, no murmur, rub, or gallop  ABDOMEN:  bowel sounds normal  M/S:   Gait and station abnormal unsteady gait  SKIN:  Inspection of skin and subcutaneous tissue baseline  NEURO:   Examination of sensation by touch normal  PSYCH:  affect and mood normal    Labs done in SNF are in [facility]. Please refer to them using [employer]/Care Everywhere.    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)  (primary encounter diagnosis)  (Z95.0) Cardiac pacemaker in situ  Comment: RRR, denies chest discomfort    Plan: DC'd metoprolol due to hypotension  -follow up cardiology  PRN  -periodic labs    (S43.005A) Dislocation of left shoulder joint, initial encounter  Comment: using shoulder more now  Plan: therapies to continue working with   -plans to transfer to LTC after TCU stay due to care needs after therapy complete    MED REC REQUIRED  Post Medication Reconciliation Status: medication reconcilation previously completed during another office visit        Electronically signed by: REBECCA Pisano CNP           Sincerely,        REBECCA Pisano CNP

## 2022-11-10 NOTE — PROGRESS NOTES
[facility] GERIATRICS    Chief Complaint   Patient presents with     RECHECK     HPI:  Sandeep Tapia is a 88 year old  (10/27/1934), who is being seen today for an episodic care visit at: [facility] ([facility]) [[id number]]. Today's concern is:   1. Tachy-ashish syndrome (H)    2. Cardiac pacemaker in situ    3. Dislocation of left shoulder joint, initial encounter      Patient seen today for follow up, also called daughter RE: status and plan, moderate dementia and tells everyone that he is returning to AL apartment but actually is going to transfer to LTC after therapy due to care needs, recent fall with L shoulder dislocation, found hypotensive, pacemaker placed, is able to raise L arm now and use for walker and meals, VS and labs WNL, no distress.    Allergies, and PMH/PSH reviewed in EPIC today.  REVIEW OF SYSTEMS:  4 point ROS including Respiratory, CV, GI and , other than that noted in the HPI,  is negative    Objective:   /67   Pulse 70   Temp 97.7  F (36.5  C)   Resp 18   Wt 59.4 kg (131 lb)   SpO2 95%   BMI 20.52 kg/m    GENERAL APPEARANCE:  in no distress, appears healthy  ENT:  Mouth and posterior oropharynx normal, moist mucous membranes  RESP:  lungs clear to auscultation   CV:  regular rate and rhythm, no murmur, rub, or gallop  ABDOMEN:  bowel sounds normal  M/S:   Gait and station abnormal unsteady gait  SKIN:  Inspection of skin and subcutaneous tissue baseline  NEURO:   Examination of sensation by touch normal  PSYCH:  affect and mood normal    Labs done in SNF are in [facility]. Please refer to them using [employer]/Care Everywhere.    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)  (primary encounter diagnosis)  (Z95.0) Cardiac pacemaker in situ  Comment: RRR, denies chest discomfort    Plan: DC'd metoprolol due to hypotension  -follow up cardiology PRN  -periodic labs    (S43.005A) Dislocation of left shoulder joint, initial encounter  Comment: using shoulder more  now  Plan: therapies to continue working with   -plans to transfer to LTC after TCU stay due to care needs after therapy complete    MED REC REQUIRED  Post Medication Reconciliation Status: medication reconcilation previously completed during another office visit        Electronically signed by: REBECCA Pisano CNP

## 2022-11-13 NOTE — PROGRESS NOTES
"[facility] GERIATRICS  [place] Medical Record Number: [id number]  Chief Complaint   Patient presents with     RECHECK     HPI: Sandeep Tapia is a 88 year old (10/27/1934), who is being seen today for an episodic care visit at: [facility] ([facility]) [[id number]]. Today's concern is:   1. Stage 3 chronic kidney disease, unspecified whether stage 3a or 3b CKD (H)    2. Tachy-ashish syndrome (H)    3. Cardiac pacemaker in situ    4. Dementia without behavioral disturbance, unspecified dementia type      Patient seen for follow up, pleasantly confused, moderate cognitive limitations, pending transfer to LTC due to care needs, thinks he is returning to AL apartment but that would not be safe, recent creat 1.41, GFR 48, I&O adequate, appears thin, post hospital stay for fall and L shoulder dislocation, incidental finding bradycardia and pacemaker installed, VS WNL, no distress.    Allergies and PMH/PSH reviewed in EPIC today.    REVIEW OF SYSTEMS:  4 point ROS including Respiratory, CV, GI and , other than that noted in the HPI,  is negative    Objective:   /60   Pulse 60   Temp 97.8  F (36.6  C)   Resp 16   Ht 1.702 m (5' 7\")   Wt 59.2 kg (130 lb 8 oz)   SpO2 99%   BMI 20.44 kg/m    Exam:  GENERAL APPEARANCE:  in no distress, appears healthy  ENT:  Mouth and posterior oropharynx normal, moist mucous membranes  RESP:  lungs clear to auscultation   CV:  no edema  ABDOMEN:  bowel sounds normal  M/S:   Gait and station abnormal transfer assist  SKIN:  Inspection of skin and subcutaneous tissue baseline  NEURO:   Examination of sensation by touch normal  PSYCH:  affect and mood normal    Labs:   Labs done in SNF are in [facility]. Please refer to them using [employer]/Care Everywhere.    Assessment/Plan:  (N18.30) Stage 3 chronic kidney disease, unspecified whether stage 3a or 3b CKD (H)  (primary encounter diagnosis)  Comment: GFR on 11/8 was 48, creat 1.41  Plan: dose medications renally as " possible  -periodic BMP's    (I49.5) Tachy-ashish syndrome (H)  (Z95.0) Cardiac pacemaker in situ  Comment: pacer installed early October, VS WNL  Plan: follow up cardiology PRN  -continue ASA and statin      (F03.90) Dementia without behavioral disturbance, unspecified dementia type  Comment: moderate limitations, SLUMS 12/30  Plan: staff to support as indicated  -continue celexa as mood stabilizer  -plans to transfer to LTC after TCU stay    MED REC REQUIRED  Post Medication Reconciliation Status: medication reconcilation previously completed during another office visit        Electronically signed by: REBECCA Pisano CNP

## 2022-11-14 NOTE — LETTER
"    11/14/2022        RE: Sandeep Tapia  [facility]  [address] [address]        [facility] GERIATRICS  [place] Medical Record Number: [id number]  Chief Complaint   Patient presents with     RECHECK     HPI: Sandeep Tapia is a 88 year old (10/27/1934), who is being seen today for an episodic care visit at: [facility] (TCU) [[id number]]. Today's concern is:   1. Stage 3 chronic kidney disease, unspecified whether stage 3a or 3b CKD (H)    2. Tachy-ashish syndrome (H)    3. Cardiac pacemaker in situ    4. Dementia without behavioral disturbance, unspecified dementia type      Patient seen for follow up, pleasantly confused, moderate cognitive limitations, pending transfer to LTC due to care needs, thinks he is returning to AL apartment but that would not be safe, recent creat 1.41, GFR 48, I&O adequate, appears thin, post hospital stay for fall and L shoulder dislocation, incidental finding bradycardia and pacemaker installed, VS WNL, no distress.    Allergies and PMH/PSH reviewed in EPIC today.    REVIEW OF SYSTEMS:  4 point ROS including Respiratory, CV, GI and , other than that noted in the HPI,  is negative    Objective:   /60   Pulse 60   Temp 97.8  F (36.6  C)   Resp 16   Ht 1.702 m (5' 7\")   Wt 59.2 kg (130 lb 8 oz)   SpO2 99%   BMI 20.44 kg/m    Exam:  GENERAL APPEARANCE:  in no distress, appears healthy  ENT:  Mouth and posterior oropharynx normal, moist mucous membranes  RESP:  lungs clear to auscultation   CV:  no edema  ABDOMEN:  bowel sounds normal  M/S:   Gait and station abnormal transfer assist  SKIN:  Inspection of skin and subcutaneous tissue baseline  NEURO:   Examination of sensation by touch normal  PSYCH:  affect and mood normal    Labs:   Labs done in SNF are in [facility]. Please refer to them using [email or web address]/Care Everywhere.    Assessment/Plan:  (N18.30) Stage 3 chronic kidney disease, unspecified whether stage 3a or 3b CKD " (H)  (primary encounter diagnosis)  Comment: GFR on 11/8 was 48, creat 1.41  Plan: dose medications renally as possible  -periodic BMP's    (I49.5) Tachy-ashish syndrome (H)  (Z95.0) Cardiac pacemaker in situ  Comment: pacer installed early October, VS WNL  Plan: follow up cardiology PRN  -continue ASA and statin      (F03.90) Dementia without behavioral disturbance, unspecified dementia type  Comment: moderate limitations, SLUMS 12/30  Plan: staff to support as indicated  -continue celexa as mood stabilizer  -plans to transfer to LTC after TCU stay    MED REC REQUIRED  Post Medication Reconciliation Status: medication reconcilation previously completed during another office visit        Electronically signed by: REBECCA Pisano CNP        Sincerely,        REBECCA Pisano CNP

## 2022-11-16 NOTE — PROGRESS NOTES
"[facility] GERIATRICS  [place] Medical Record Number: [id number]  Chief Complaint   Patient presents with     RECHECK     HPI: Sandeep Tapia is a 88 year old (10/27/1934), who is being seen today for an episodic care visit at: [facility] [[id number]]. Today's concern is:   1. Tachy-ashish syndrome (H)    2. Stage 3 chronic kidney disease, unspecified whether stage 3a or 3b CKD (H)    3. Dementia without behavioral disturbance, unspecified dementia type      Patient seen for follow up, post hospitalization with pacemaker placement, incision healed, denies palpitations, recent creat 1.41, GFR 48, moderate cognitive limitations, today would not take morning meds even with my approval as he thought it may be some type of conspiracy, mild weight loss, appears healthy.    Allergies and PMH/PSH reviewed in EPIC today.    REVIEW OF SYSTEMS:  4 point ROS including Respiratory, CV, GI and , other than that noted in the HPI,  is negative    Objective:   /59   Pulse 62   Temp 97.9  F (36.6  C)   Resp 18   Ht 1.702 m (5' 7\")   Wt 59.2 kg (130 lb 8 oz)   SpO2 98%   BMI 20.44 kg/m    Exam:  GENERAL APPEARANCE:  in no distress, appears healthy  ENT:  Mouth and posterior oropharynx normal, moist mucous membranes  RESP:  lungs clear to auscultation   CV:  no edema  ABDOMEN:  bowel sounds normal  M/S:   Gait and station abnormal transfer assist  SKIN:  Inspection of skin and subcutaneous tissue baseline  NEURO:   Examination of sensation by touch normal  PSYCH:  affect and mood normal    Labs:   Labs done in SNF are in [facility]. Please refer to them using [employer]/Care Everywhere.    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)  (primary encounter diagnosis)  Comment: hospital 9/22 after fall, pacer placed  Plan: staff to check VS daily  -continue ASA and statin  -follow up cardiology PRN    (N18.30) Stage 3 chronic kidney disease, unspecified whether stage 3a or 3b CKD (H)  Comment: labs " 11/8 creat 1.41, GFR 48  Plan: dose medications renally as possible  -follow up BMP in 1-2 months    (F03.90) Dementia without behavioral disturbance, unspecified dementia type  Comment: SLUMS 12/30, mild delusions  Plan: staff to support as indicated  -meeting with daughter 11/21 to complete POLST  -continue celexa as mood stabilizer  -plans to transfer to LTC after TCU stay    MED REC REQUIRED  Post Medication Reconciliation Status: medication reconcilation previously completed during another office visit        Electronically signed by: REBECCA Pisano CNP

## 2022-11-17 NOTE — LETTER
"    11/17/2022        RE: Sandeep Tapia  [facility]  [address] [address]        [facility] GERIATRICS  [place] Medical Record Number: [id number]  Chief Complaint   Patient presents with     RECHECK     HPI: Sandeep Tapia is a 88 year old (10/27/1934), who is being seen today for an episodic care visit at: [facility] (TC) [[id number]]. Today's concern is:   1. Tachy-ashish syndrome (H)    2. Stage 3 chronic kidney disease, unspecified whether stage 3a or 3b CKD (H)    3. Dementia without behavioral disturbance, unspecified dementia type      Patient seen for follow up, post hospitalization with pacemaker placement, incision healed, denies palpitations, recent creat 1.41, GFR 48, moderate cognitive limitations, today would not take morning meds even with my approval as he thought it may be some type of conspiracy, mild weight loss, appears healthy.    Allergies and PMH/PSH reviewed in EPIC today.    REVIEW OF SYSTEMS:  4 point ROS including Respiratory, CV, GI and , other than that noted in the HPI,  is negative    Objective:   /59   Pulse 62   Temp 97.9  F (36.6  C)   Resp 18   Ht 1.702 m (5' 7\")   Wt 59.2 kg (130 lb 8 oz)   SpO2 98%   BMI 20.44 kg/m    Exam:  GENERAL APPEARANCE:  in no distress, appears healthy  ENT:  Mouth and posterior oropharynx normal, moist mucous membranes  RESP:  lungs clear to auscultation   CV:  no edema  ABDOMEN:  bowel sounds normal  M/S:   Gait and station abnormal transfer assist  SKIN:  Inspection of skin and subcutaneous tissue baseline  NEURO:   Examination of sensation by touch normal  PSYCH:  affect and mood normal    Labs:   Labs done in SNF are in [facility]. Please refer to them using [employer]IC/Care Everywhere.    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)  (primary encounter diagnosis)  Comment: hospital 9/22 after fall, pacer placed  Plan: staff to check VS daily  -continue ASA and statin  -follow up " cardiology PRN    (N18.30) Stage 3 chronic kidney disease, unspecified whether stage 3a or 3b CKD (H)  Comment: labs 11/8 creat 1.41, GFR 48  Plan: dose medications renally as possible  -follow up BMP in 1-2 months    (F03.90) Dementia without behavioral disturbance, unspecified dementia type  Comment: SLUMS 12/30, mild delusions  Plan: staff to support as indicated  -meeting with daughter 11/21 to complete POLST  -continue celexa as mood stabilizer  -plans to transfer to LTC after TCU stay    MED REC REQUIRED  Post Medication Reconciliation Status: medication reconcilation previously completed during another office visit        Electronically signed by: REBECCA Pisano CNP        Sincerely,        REBECCA Pisano CNP

## 2022-11-21 NOTE — LETTER
11/21/2022        RE: Sandeep Tapia  [facility]  [address] [address]        [facility] GERIATRICS    Chief Complaint   Patient presents with     RECHECK     HPI:  Sandeep Tapia is a 88 year old  (10/27/1934), who is being seen today for an episodic care visit at: [facility] ([facility]) [[id number]]. Today's concern is:   1. Tachy-ashish syndrome (H)    2. Cardiac pacemaker in situ    3. Dementia without behavioral disturbance, unspecified dementia type    4. ACP (advance care planning)      Patient seen today on request, daughter Brunilda present to review status and plan plus re-do POLST, post hospital with fall, bradycardic, pacemaker placed, incision healed, increased cognitive limitations with SLUMS 12/30, unable to return back to AL facility due to care needs.    Advance Care Planning Goals of Care Discussion 11/21/2022  Goals of care discussed with Sandeep Tapia on 11/21. Present at discussion: patient, Brunilda. Questions discussed and patient response:  What is your understanding now of where you are with your illness?: everything. How much information about what is likely to be ahead with your illness would you like to have?: everything. As the clinician I communicated the following to the patient regarding their prognosis: good. If your health situation worsens, what are your most important goals?: be healthy. What are your biggest fears and worries about the future with your health?: dying. Unacceptable function : unsure. What abilities are so critical to your life that you cannot imagine living without them?: independent. Pt does NOT want to: die yet. If you become sicker, how much are you willing to go through for the possibility of gaining more time?: probably yes. Would this change if these were permanent states, if they did not get better?: maybe. How much does your agent and/or family know about your priorities and wishes?: everything. Added by Kirby Patrick  "REBECCA Fuentes CNP         Allergies, and PMH/PSH reviewed in EPIC today.  REVIEW OF SYSTEMS:  4 point ROS including Respiratory, CV, GI and , other than that noted in the HPI,  is negative    Objective:   BP (!) 147/71   Pulse 75   Temp 97.6  F (36.4  C)   Resp 16   Ht 1.702 m (5' 7\")   Wt 59.2 kg (130 lb 8 oz)   SpO2 99%   BMI 20.44 kg/m    GENERAL APPEARANCE:  in no distress, appears healthy, thin  ENT:  Mouth and posterior oropharynx normal, moist mucous membranes  RESP:  lungs clear to auscultation   CV:  no edema  ABDOMEN:  bowel sounds normal  M/S:   Gait and station abnormal transfer assist  SKIN:  Inspection of skin and subcutaneous tissue baseline  NEURO:   Examination of sensation by touch normal  PSYCH:  affect and mood normal    Labs done in SNF [facility] in [facility]. Please refer to them using [employer]/Care Everywhere.    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)  (primary encounter diagnosis)  (Z95.0) Cardiac pacemaker in situ  Comment: bradycardic, pacer site healed  Plan: follow up cardiology/pacer checks  -DC'd BP meds, was running in the 90's  -continue statin 10mg    (F03.90) Dementia without behavioral disturbance, unspecified dementia type  Comment: moderate dementia, SLUMS 12/30  Plan: staff to monitor PRN  -plans to transfer to LTC room after TCU stay    (Z71.89) ACP (advance care planning)  Comment: POLST changed to DNR  Plan: [place] ADVANCE CARE PLANNING FIRST 30 MINS          I spent 22 minutes f2f with patient/daughter in addition to todays visit completing a POLST form.    MED REC REQUIRED  Post Medication Reconciliation Status: medication reconcilation previously completed during another office visit        Electronically signed by: REBECCA Pisano CNP           Sincerely,        REBECCA Pisano CNP      "

## 2022-11-21 NOTE — PROGRESS NOTES
[facility] GERIATRICS    Chief Complaint   Patient presents with     RECHECK     HPI:  Sandeep Tapia is a 88 year old  (10/27/1934), who is being seen today for an episodic care visit at: [facility] ([facility]) [[id number]]. Today's concern is:   1. Tachy-ashish syndrome (H)    2. Cardiac pacemaker in situ    3. Dementia without behavioral disturbance, unspecified dementia type    4. ACP (advance care planning)      Patient seen today on request, daughter Brunilda present to review status and plan plus re-do POLST, post hospital with fall, bradycardic, pacemaker placed, incision healed, increased cognitive limitations with SLUMS 12/30, unable to return back to AL facility due to care needs.    Advance Care Planning Goals of Care Discussion 11/21/2022  Goals of care discussed with Sandeep Tapia on 11/21. Present at discussion: patient, Brunilda. Questions discussed and patient response:  What is your understanding now of where you are with your illness?: everything. How much information about what is likely to be ahead with your illness would you like to have?: everything. As the clinician I communicated the following to the patient regarding their prognosis: good. If your health situation worsens, what are your most important goals?: be healthy. What are your biggest fears and worries about the future with your health?: dying. Unacceptable function : unsure. What abilities are so critical to your life that you cannot imagine living without them?: independent. Pt does NOT want to: die yet. If you become sicker, how much are you willing to go through for the possibility of gaining more time?: probably yes. Would this change if these were permanent states, if they did not get better?: maybe. How much does your agent and/or family know about your priorities and wishes?: everything. Added by Kirby Fuentes, REBECCA CNP         Allergies, and PMH/PSH reviewed in [employer] today.  REVIEW OF SYSTEMS:  4 point ROS  "including Respiratory, CV, GI and , other than that noted in the HPI,  is negative    Objective:   BP (!) 147/71   Pulse 75   Temp 97.6  F (36.4  C)   Resp 16   Ht 1.702 m (5' 7\")   Wt 59.2 kg (130 lb 8 oz)   SpO2 99%   BMI 20.44 kg/m    GENERAL APPEARANCE:  in no distress, appears healthy, thin  ENT:  Mouth and posterior oropharynx normal, moist mucous membranes  RESP:  lungs clear to auscultation   CV:  no edema  ABDOMEN:  bowel sounds normal  M/S:   Gait and station abnormal transfer assist  SKIN:  Inspection of skin and subcutaneous tissue baseline  NEURO:   Examination of sensation by touch normal  PSYCH:  affect and mood normal    Labs done in SNF are in [place] EPIC. Please refer to them using [employer] Everywhere.    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)  (primary encounter diagnosis)  (Z95.0) Cardiac pacemaker in situ  Comment: bradycardic, pacer site healed  Plan: follow up cardiology/pacer checks  -DC'd BP meds, was running in the 90's  -continue statin 10mg    (F03.90) Dementia without behavioral disturbance, unspecified dementia type  Comment: moderate dementia, SLUMS 12/30  Plan: staff to monitor PRN  -plans to transfer to LTC room after TCU stay    (Z71.89) ACP (advance care planning)  Comment: POLST changed to DNR  Plan: [place] ADVANCE CARE PLANNING FIRST 30 MINS          I spent 22 minutes f2f with patient/daughter in addition to todays visit completing a POLST form.    MED REC REQUIRED  Post Medication Reconciliation Status: medication reconcilation previously completed during another office visit        Electronically signed by: Kirby Fuentes, REBECCA CNP     "

## 2022-11-28 NOTE — LETTER
"    11/28/2022        RE: Sandeep Tapia  [facility]  [address] [address]        [facility] GERIATRICS    Chief Complaint   Patient presents with     RECHECK     HPI:  Sandeep Tapia is a 88 year old  (10/27/1934), who is being seen today for an episodic care visit at: [facility] (TC) [[id number]]. Today's concern is:   1. Tachy-ashish syndrome (H)    2. Cardiac pacemaker in situ    3. Dislocation of left shoulder joint, initial encounter      Patient seen for follow up, hospital 10/3 with fall/L shoulder dislocation, tachy/ashish and pacemaker installed, today 112/53, 68, 97.2, 100% on RA, appears healthy, moderate cognitive limitations, denies CP/palpitations, intermittent L shoulder pain with increased ROM with therapies working with, now WC mobility due to unsteadiness, no distress.    Allergies, and PMH/PSH reviewed in EPIC today.  REVIEW OF SYSTEMS:  4 point ROS including Respiratory, CV, GI and , other than that noted in the HPI,  is negative    Objective:   /63   Pulse 62   Temp 98  F (36.7  C)   Resp 20   Ht 1.702 m (5' 7\")   Wt 57.6 kg (126 lb 14.4 oz)   SpO2 100%   BMI 19.88 kg/m    GENERAL APPEARANCE:  in no distress, appears healthy  ENT:  Mouth and posterior oropharynx normal, moist mucous membranes  RESP:  lungs clear to auscultation   CV:  no edema  ABDOMEN:  bowel sounds normal  M/S:   Gait and station abnormal WC mobility  SKIN:  Inspection of skin and subcutaneous tissue baseline  NEURO:   Examination of sensation by touch normal  PSYCH:  affect and mood normal    Labs done in SNF are in [facility]. Please refer to them using [employer]/Care Everywhere.    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)  (primary encounter diagnosis)  (Z95.0) Cardiac pacemaker in situ  Comment: SBP's in the  range, HR's 60-80  Plan: follow up cardiology with pacer checks PRN  -continue ASA, statin  -staff to check VS daily    (S43.005A) " Dislocation of left shoulder joint, initial encounter  Comment: intermittent pain/discomfort, improved ROM  Plan:  -add aspercreme 1g BID for L shoulder/arm  -continue APAP TID for now  -PT/OT still working with  -probable discharge this week to LTC at  pending room availability     MED REC REQUIRED  Post Medication Reconciliation Status: medication reconcilation previously completed during another office visit        Electronically signed by: REBECCA Pisano CNP           Sincerely,        REBECCA Pisano CNP

## 2022-11-28 NOTE — PROGRESS NOTES
"[facility] GERIATRICS    Chief Complaint   Patient presents with     RECHECK     HPI:  Sandeep Tapia is a 88 year old  (10/27/1934), who is being seen today for an episodic care visit at: [facility] ([facility]) [[id number]]. Today's concern is:   1. Tachy-ashish syndrome (H)    2. Cardiac pacemaker in situ    3. Dislocation of left shoulder joint, initial encounter      Patient seen for follow up, hospital 10/3 with fall/L shoulder dislocation, tachy/ashish and pacemaker installed, today 112/53, 68, 97.2, 100% on RA, appears healthy, moderate cognitive limitations, denies CP/palpitations, intermittent L shoulder pain with increased ROM with therapies working with, now WC mobility due to unsteadiness, no distress.    Allergies, and PMH/PSH reviewed in EPIC today.  REVIEW OF SYSTEMS:  4 point ROS including Respiratory, CV, GI and , other than that noted in the HPI,  is negative    Objective:   /63   Pulse 62   Temp 98  F (36.7  C)   Resp 20   Ht 1.702 m (5' 7\")   Wt 57.6 kg (126 lb 14.4 oz)   SpO2 100%   BMI 19.88 kg/m    GENERAL APPEARANCE:  in no distress, appears healthy  ENT:  Mouth and posterior oropharynx normal, moist mucous membranes  RESP:  lungs clear to auscultation   CV:  no edema  ABDOMEN:  bowel sounds normal  M/S:   Gait and station abnormal WC mobility  SKIN:  Inspection of skin and subcutaneous tissue baseline  NEURO:   Examination of sensation by touch normal  PSYCH:  affect and mood normal    Labs done in SNF are in [facility]. Please refer to them using [facility]/Care Everywhere.    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)  (primary encounter diagnosis)  (Z95.0) Cardiac pacemaker in situ  Comment: SBP's in the  range, HR's 60-80  Plan: follow up cardiology with pacer checks PRN  -continue ASA, statin  -staff to check VS daily    (S43.005A) Dislocation of left shoulder joint, initial encounter  Comment: intermittent pain/discomfort, improved ROM  Plan:  -add " aspercreme 1g BID for L shoulder/arm  -continue APAP TID for now  -PT/OT still working with  -probable discharge this week to LTC at  pending room availability     MED REC REQUIRED  Post Medication Reconciliation Status: medication reconcilation previously completed during another office visit        Electronically signed by: REBECCA Pisano CNP

## 2022-12-01 NOTE — LETTER
12/1/2022        [facility]  [address] [facility] [address]        [facility] GERIATRICS DISCHARGE SUMMARY  PATIENT'S NAME: Sandeep Tapia  YOB: 1934  MEDICAL RECORD NUMBER:  [id number]  Place of Service where encounter took place:  [facility] ([facility]) [[id number]]    PRIMARY CARE PROVIDER AND CLINIC RESPONSIBLE AFTER TRANSFER:   REBECCA Pisano CNP, [address] [facility] [address]    [facility] Provider     Transferring providers: REBECCA Copeland CNP; Brian Ruiz MD  Recent Hospitalization/ED:  Hospital  [facility] stay 9/22/22 to 9/28/22.  Date of SNF Admission: October 28, 2022  Date of SNF (anticipated) Discharge: December 01, 2022  Discharged to: [facility] nursing facility [facility]  Cognitive Scores: SLUMS: 12/30  Physical Function: Pivot transfers  DME: Wheelchair and Walker    CODE STATUS/ADVANCE DIRECTIVES DISCUSSION:  Full Code   ALLERGIES: Patient has no known allergies.    NURSING FACILITY COURSE   Medication Changes/Rationale:     See notes    Summary of nursing facility stay:      Tachy-ashish syndrome (H)  Cardiac pacemaker in situ  Dislocation of left shoulder joint, initial encounter  Stage 3 chronic kidney disease, unspecified whether stage 3a or 3b CKD (H)  Dementia without behavioral disturbance (H)     (I49.5) Tachy-ashish syndrome (H)  (primary encounter diagnosis)  (I48.91) Atrial fibrillation with RVR (H)  (Z95.0) Cardiac pacemaker in situ  Comment: new tachy ashish, historical afib, previously on warfarin, pacer placed 9/27  Plan:   -CBC, BMP, TSH on 10/3 in chart  -discontinue metoprolol, soft BP's  -decrease flomax to 0.4mg; soft BP's  -continue ASA81, lipitor  -staff to check VS PRN  -follow up [facility] cardiology PRN     (S43.005D) Dislocation of left shoulder joint, subsequent encounter  (R29.6) Recurrent falls  Comment: from [facility] [place], increased recent falls  Plan:   -discontinue  sling  -WBAT  -APAP PRN for pain  -follow up [facility] ortho completed     (N18.30) Stage 3 chronic kidney disease, unspecified whether stage 3a or 3b CKD (H)  Comment: creat elevated in the 1.4-1.5 range in hospital  Plan: dose medications renally as possible  -BMP on 10/3, results in EPIC     (F03.90) Dementia without behavioral disturbance, unspecified dementia type (H)  Comment: progressive cognitive changes and limitations, exacerbated by hospital/anesthesia  Plan:   -staff to support as indicated  -transferring to LTC due to care needs       Discharge Medications:  MED REC REQUIRED  Post Medication Reconciliation Status: medication reconcilation previously completed during another office visit       Current Outpatient Medications   Medication Sig Dispense Refill     ACE/ARB/ARNI NOT PRESCRIBED (INTENTIONAL) Please choose reason not prescribed from choices below.       acetaminophen (TYLENOL) 500 MG tablet Take 2 tablets (1,000 mg) by mouth 3 times daily 60 tablet 11     aspirin (ASA) 81 MG chewable tablet Take 1 tablet (81 mg) by mouth daily 30 tablet 0     atorvastatin (LIPITOR) 20 MG tablet Take 0.5 tablets (10 mg) by mouth daily 30 tablet 11     CARBOXYMETHYLCELLULOSE SODIUM OP Place 1 drop into both eyes every 6 hours as needed       citalopram (CELEXA) 20 MG tablet TAKE 1 TAB BY MOUTH ONCE DAILY FOR DEPRESSION 30 tablet 11     levothyroxine (SYNTHROID/LEVOTHROID) 25 MCG tablet Take 1 tablet (25 mcg) by mouth daily 30 tablet 11     polyethylene glycol (MIRALAX) 17 g packet Take 17 g by mouth daily       senna-docusate (SENOKOT-S/PERICOLACE) 8.6-50 MG tablet Take 1 tablet by mouth 2 times daily       tamsulosin (FLOMAX) 0.4 MG capsule Take 1 capsule (0.4 mg) by mouth every evening 30 capsule 11     trolamine salicylate (ASPERCREME) 10 % external cream Apply topically 2 times daily 1 g 0        Controlled medications:   not applicable/none     Past Medical History: No past medical history on file.  Physical Exam:  "  Vitals: BP (!) 141/71   Pulse 64   Temp 97.5  F (36.4  C)   Resp 14   Ht 1.702 m (5' 7\")   Wt 56.7 kg (125 lb)   SpO2 98%   BMI 19.58 kg/m    BMI: Body mass index is 19.58 kg/m .  GENERAL APPEARANCE:  in no distress, appears healthy  ENT:  Mouth and posterior oropharynx normal, moist mucous membranes  RESP:  lungs clear to auscultation   CV:  no edema  ABDOMEN:  bowel sounds normal  M/S:   Gait and station abnormal unsteady gait  SKIN:  Inspection of skin and subcutaneous tissue baseline  NEURO:   Examination of sensation by touch normal  PSYCH:  affect and mood normal     SNF labs: Labs done in SNF are in [place] EPIC. Please refer to them using [employer] Everywhere.    DISCHARGE PLAN:    Follow up labs: No labs orders/due    Medical Follow Up:      Follow up with primary care provider in 1 weeks    Current [place] scheduled appointments:   NA    Discharge Services: Home Care:  Occupational Therapy and Physical Therapy    Discharge Instructions Verbalized to Patient at Discharge:     None    TOTAL DISCHARGE TIME:   Greater than 30 minutes  Electronically signed by:  REBECCA Pisano CNP         Documentation of Face-to-Face and Certification for Home Health Services     Patient: Sandeep Tapia   YOB: 1934  MR Number: [id number]  Today's Date: 12/1/2022    I certify that patient: Sandeep Tapia is under my care and that I, or a nurse practitioner or physician's assistant working with me, had a face-to-face encounter that meets the physician face-to-face encounter requirements with this patient on: 12/1/2022.    This encounter with the patient was in whole, or in part, for the following medical condition, which is the primary reason for home health care:   1. Tachy-ashish syndrome (H)    2. Cardiac pacemaker in situ    3. Dislocation of left shoulder joint, initial encounter    4. Stage 3 chronic kidney disease, unspecified whether stage 3a or 3b CKD (H)    5. Dementia without " behavioral disturbance, unspecified dementia type          I certify that, based on my findings, the following services are medically necessary home health services: Occupational Therapy and Physical Therapy.    My clinical findings support the need for the above services because: Occupational Therapy Services are needed to assess and treat cognitive ability and address ADL safety due to impairment in transfers, DME eval. and Physical Therapy Services are needed to assess and treat the following functional impairments: gait instability.    Further, I certify that my clinical findings support that this patient is homebound (i.e. absences from home require considerable and taxing effort and are for medical reasons or Protestant services or infrequently or of short duration when for other reasons) because: Requires assistance of another person or specialized equipment to access medical services because patient: Is unable to operate assistive equipment on their own...    Based on the above findings. I certify that this patient is confined to the home and needs intermittent skilled nursing care, physical therapy and/or speech therapy.  The patient is under my care, and I have initiated the establishment of the plan of care.  This patient will be followed by a physician who will periodically review the plan of care.  Physician/Provider to provide follow up care: Kirby Fuentes    Responsible Medicare certified PECOS Physician: Kirby Fuentes NP  Electronic Physician Signature  Date: 12/1/2022                  Sincerely,        REBECCA Pisano CNP

## 2022-12-01 NOTE — PROGRESS NOTES
[facility] GERIATRICS DISCHARGE SUMMARY  PATIENT'S NAME: Sandeep Tapia  YOB: 1934  MEDICAL RECORD NUMBER:  [id number]  Place [facility] where [facility] took [facility] [[id number]]    PRIMARY CARE PROVIDER AND CLINIC RESPONSIBLE AFTER TRANSFER:   REBECCA Pisano CNP, [address] [facility] [address]    [facility] Provider     Transferring providers: REBECCA Copeland CNP; Brian Ruiz MD  Recent Hospitalization/ED:  Hospital  [facility] stay 9/22/22 to 9/28/22.  Date of SNF Admission: October 28, 2022  Date of SNF (anticipated) Discharge: December 01, 2022  Discharged to: new [facility]  Cognitive Scores: SLUMS: 12/30  Physical Function: Pivot transfers  DME: Wheelchair and Walker    CODE STATUS/ADVANCE DIRECTIVES DISCUSSION:  Full Code   ALLERGIES: Patient has no known allergies.    NURSING FACILITY COURSE   Medication Changes/Rationale:     See notes    Summary of nursing facility stay:      Tachy-ashish syndrome (H)  Cardiac pacemaker in situ  Dislocation of left shoulder joint, initial encounter  Stage 3 chronic kidney disease, unspecified whether stage 3a or 3b CKD (H)  Dementia without behavioral disturbance (H)     (I49.5) Tachy-ashish syndrome (H)  (primary encounter diagnosis)  (I48.91) Atrial fibrillation with RVR (H)  (Z95.0) Cardiac pacemaker in situ  Comment: new tachy ashish, historical afib, previously on warfarin, pacer placed 9/27  Plan:   -CBC, BMP, TSH on 10/3 in chart  -discontinue metoprolol, soft BP's  -decrease flomax to 0.4mg; soft BP's  -continue ASA81, lipitor  -staff to check VS PRN  -follow up [facility] cardiology PRN     (S43.005D) Dislocation of left shoulder joint, subsequent encounter  (R29.6) Recurrent falls  Comment: from [facility], increased recent falls  Plan:   -discontinue sling  -WBAT  -APAP PRN for pain  -follow up [facility] ortho completed     (N18.30) Stage 3 chronic kidney disease, unspecified  "whether stage 3a or 3b CKD (H)  Comment: creat elevated in the 1.4-1.5 range in hospital  Plan: dose medications renally as possible  -BMP on 10/3, results in EPIC     (F03.90) Dementia without behavioral disturbance, unspecified dementia type (H)  Comment: progressive cognitive changes and limitations, exacerbated by hospital/anesthesia  Plan:   -staff to support as indicated  -transferring to LTC due to care needs       Discharge Medications:  MED REC REQUIRED  Post Medication Reconciliation Status: medication reconcilation previously completed during another office visit       Current Outpatient Medications   Medication Sig Dispense Refill     ACE/ARB/ARNI NOT PRESCRIBED (INTENTIONAL) Please choose reason not prescribed from choices below.       acetaminophen (TYLENOL) 500 MG tablet Take 2 tablets (1,000 mg) by mouth 3 times daily 60 tablet 11     aspirin (ASA) 81 MG chewable tablet Take 1 tablet (81 mg) by mouth daily 30 tablet 0     atorvastatin (LIPITOR) 20 MG tablet Take 0.5 tablets (10 mg) by mouth daily 30 tablet 11     CARBOXYMETHYLCELLULOSE SODIUM OP Place 1 drop into both eyes every 6 hours as needed       citalopram (CELEXA) 20 MG tablet TAKE 1 TAB BY MOUTH ONCE DAILY FOR DEPRESSION 30 tablet 11     levothyroxine (SYNTHROID/LEVOTHROID) 25 MCG tablet Take 1 tablet (25 mcg) by mouth daily 30 tablet 11     polyethylene glycol (MIRALAX) 17 g packet Take 17 g by mouth daily       senna-docusate (SENOKOT-S/PERICOLACE) 8.6-50 MG tablet Take 1 tablet by mouth 2 times daily       tamsulosin (FLOMAX) 0.4 MG capsule Take 1 capsule (0.4 mg) by mouth every evening 30 capsule 11     trolamine salicylate (ASPERCREME) 10 % external cream Apply topically 2 times daily 1 g 0        Controlled medications:   not applicable/none     Past Medical History: No past medical history on file.  Physical Exam:   Vitals: BP (!) 141/71   Pulse 64   Temp 97.5  F (36.4  C)   Resp 14   Ht 1.702 m (5' 7\")   Wt 56.7 kg (125 lb)   " SpO2 98%   BMI 19.58 kg/m    BMI: Body mass index is 19.58 kg/m .  GENERAL APPEARANCE:  in no distress, appears healthy  ENT:  Mouth and posterior oropharynx normal, moist mucous membranes  RESP:  lungs clear to auscultation   CV:  no edema  ABDOMEN:  bowel sounds normal  M/S:   Gait and station abnormal unsteady gait  SKIN:  Inspection of skin and subcutaneous tissue baseline  NEURO:   Examination of sensation by touch normal  PSYCH:  affect and mood normal     SNF labs: Labs done in SNF are in [facility]. Please refer to them using EPIC/Care Everywhere.    DISCHARGE PLAN:    Follow up labs: No labs orders/due    Medical Follow Up:      Follow up with primary care provider in 1 weeks    Current [place] scheduled appointments:   NA    Discharge Services: Home Care:  Occupational Therapy and Physical Therapy    Discharge Instructions Verbalized to Patient at Discharge:     None    TOTAL DISCHARGE TIME:   Greater than 30 minutes  Electronically signed by:  REBECCA Pisano CNP         Documentation of Face-to-Face and Certification for Home Health Services     Patient: Sandeep Tapia   YOB: 1934  MR Number: [id number]  Today's Date: 12/1/2022    I certify that patient: Sandeep Tpaia is under my care and that I, or a nurse practitioner or physician's assistant working with me, had a face-to-face encounter that meets the physician face-to-face encounter requirements with this patient on: 12/1/2022.    This encounter with the patient was in whole, or in part, for the following medical condition, which is the primary reason for home health care:   1. Tachy-ashish syndrome (H)    2. Cardiac pacemaker in situ    3. Dislocation of left shoulder joint, initial encounter    4. Stage 3 chronic kidney disease, unspecified whether stage 3a or 3b CKD (H)    5. Dementia without behavioral disturbance, unspecified dementia type          I certify that, based on my findings, the following services are  medically necessary home health services: Occupational Therapy and Physical Therapy.    My clinical findings support the need for the above services because: Occupational Therapy Services are needed to assess and treat cognitive ability and address ADL safety due to impairment in transfers, DME eval. and Physical Therapy Services are needed to assess and treat the following functional impairments: gait instability.    Further, I certify that my clinical findings support that this patient is homebound (i.e. absences from home require considerable and taxing effort and are for medical reasons or Scientologist services or infrequently or of short duration when for other reasons) because: Requires assistance of another person or specialized equipment to access medical services because patient: Is unable to operate assistive equipment on their own...    Based on the above findings. I certify that this patient is confined to the home and needs intermittent skilled nursing care, physical therapy and/or speech therapy.  The patient is under my care, and I have initiated the establishment of the plan of care.  This patient will be followed by a physician who will periodically review the plan of care.  Physician/Provider to provide follow up care: Kirby Fuentes    Responsible Medicare certified PECOS Physician: Kirby Fuentes NP  Electronic Physician Signature  Date: 12/1/2022

## 2022-12-05 NOTE — LETTER
12/5/2022        RE: Sandeep Tapia  [facility]  [address] [address]        [facility] GERIATRICS    PRIMARY CARE PROVIDER AND CLINIC:  REBECCA Pisano [facility], [address] [facility] [address]  Chief Complaint   Patient presents with     [facility] Medical Record Number:  [id number]  Place of Service where encounter took place:  [facility] [[id number]]    Sandeep Tapia  is a 88 year old  (10/27/1934), [facility]   HPI:    1. Tachy-ashish syndrome (H)    2. Mild protein-calorie malnutrition (H)    3. Dislocation of left shoulder joint, initial encounter      Patient seen for follow up, was AL resident, then hospital for fall, then TCU, now transferred to [facility] due to care needs on 12/1, previously fairly displeased to not return to [place] apartment but appears to be transitioning well, HR in the  range, denies CP/palpitations, new pacemaker in place, afebrile, 112/53, 97.2, 100% on RA, using WC for mobility now, BMI 19.58 with gaunt facies, moderate dementia and fall risk, last fall attempting self transfer was 12/3 without injury, mild L shoulder discomfort, no distress.    CODE STATUS/ADVANCE DIRECTIVES DISCUSSION:  Full Code  On file    ALLERGIES: No Known Allergies   PAST MEDICAL HISTORY: No past medical history on file.   PAST SURGICAL HISTORY:   has no past surgical history on file.  FAMILY HISTORY: family history is not on file.  SOCIAL HISTORY:   reports that he has quit smoking. He has never used smokeless tobacco. He reports that he does not currently use alcohol.  Patient's living condition: lives in a skilled nursing facility    Post Discharge Medication Reconciliation Status:   MED REC REQUIRED  Post Medication Reconciliation Status: medication reconcilation previously completed during another office visit       Current Outpatient Medications    Medication Sig     trolamine salicylate (ASPERCREME) 10 % external cream Apply topically 2 times daily     ACE/ARB/ARNI NOT PRESCRIBED (INTENTIONAL) Please choose reason not prescribed from choices below.     acetaminophen (TYLENOL) 500 MG tablet Take 2 tablets (1,000 mg) by mouth 3 times daily     aspirin (ASA) 81 MG chewable tablet Take 1 tablet (81 mg) by mouth daily     atorvastatin (LIPITOR) 20 MG tablet Take 0.5 tablets (10 mg) by mouth daily     CARBOXYMETHYLCELLULOSE SODIUM OP Place 1 drop into both eyes every 6 hours as needed     citalopram (CELEXA) 20 MG tablet TAKE 1 TAB BY MOUTH ONCE DAILY FOR DEPRESSION     levothyroxine (SYNTHROID/LEVOTHROID) 25 MCG tablet Take 1 tablet (25 mcg) by mouth daily     polyethylene glycol (MIRALAX) 17 g packet Take 17 g by mouth daily     senna-docusate (SENOKOT-S/PERICOLACE) 8.6-50 MG tablet Take 1 tablet by mouth 2 times daily     tamsulosin (FLOMAX) 0.4 MG capsule Take 1 capsule (0.4 mg) by mouth every evening     trolamine salicylate (ASPERCREME) 10 % external cream Apply topically 2 times daily     No current facility-administered medications for this visit.       ROS:  4 point ROS including Respiratory, CV, GI and , other than that noted in the HPI,  is negative    Vitals:  /66   Pulse 89   Temp 97.7  F (36.5  C)   Resp 18   SpO2 99%   Exam:  GENERAL APPEARANCE:  in no distress, appears healthy  ENT:  Mouth and posterior oropharynx normal, moist mucous membranes  RESP:  lungs clear to auscultation   CV:  no edema  ABDOMEN:  bowel sounds normal  M/S:   Gait and station abnormal WC mobility  SKIN:  Inspection of skin and subcutaneous tissue baseline  NEURO:   Examination of sensation by touch normal  PSYCH:  affect and mood normal    Lab/Diagnostic data:  Labs done in SNF are in [facility]. Please refer to them using [employer]/Care Everywhere.    ASSESSMENT/PLAN:    (I49.5) Tachy-ashish syndrome (H)  (primary encounter diagnosis)  Comment: fall 10/3, sent to  ER, admitted for tachycardia and pacemaker placed  Plan: follow up cardiology PRN  -continue ASA and statin  -staff to check VS as scheduled    (E44.1) Mild protein-calorie malnutrition (H)  Comment: BMI 19.58, appears thin/frail  Plan: encourage intake  -dietary to follow up weights/supplements    (S43.005A) Dislocation of left shoulder joint, initial encounter  Comment: L shoulder mild pain, ROM improved  Plan: start aspercreme BID to L should  -continue APAP TID scheduled for now          Electronically signed by:  REBECCA Pisano CNP                     Sincerely,        REBECCA Pisano CNP

## 2022-12-05 NOTE — PROGRESS NOTES
[facility] GERIATRICS    PRIMARY CARE PROVIDER AND CLINIC:  Kirby Fuentes, REBECCA CNP, [address] [facility] [address]  Chief Complaint   Patient presents with     [facility] Medical Record Number:  [id number]  Place of Service where encounter took place:  [facility] [[id number]]    Sandeep Tapia  is a 88 year old  (10/27/1934), [facility]   HPI:    1. Tachy-ashish syndrome (H)    2. Mild protein-calorie malnutrition (H)    3. Dislocation of left shoulder joint, initial encounter      Patient seen for follow up, was AL resident, then hospital for fall, then TCU, now transferred to [facility] due to care needs on 12/1, previously fairly displeased to not return to AL apartment but appears to be transitioning well, HR in the  range, denies CP/palpitations, new pacemaker in place, afebrile, 112/53, 97.2, 100% on RA, using WC for mobility now, BMI 19.58 with gaunt facies, moderate dementia and fall risk, last fall attempting self transfer was 12/3 without injury, mild L shoulder discomfort, no distress.    CODE STATUS/ADVANCE DIRECTIVES DISCUSSION:  Full Code  On file    ALLERGIES: No Known Allergies   PAST MEDICAL HISTORY: No past medical history on file.   PAST SURGICAL HISTORY:   has no past surgical history on file.  FAMILY HISTORY: family history is not on file.  SOCIAL HISTORY:   reports that he has quit smoking. He has never used smokeless tobacco. He reports that he does not currently use alcohol.  Patient's living condition: lives in a skilled nursing facility    Post Discharge Medication Reconciliation Status:   MED REC REQUIRED  Post Medication Reconciliation Status: medication reconcilation previously completed during another office visit       Current Outpatient Medications   Medication Sig     trolamine salicylate (ASPERCREME) 10 % external cream Apply topically 2 times daily     ACE/ARB/ARNI NOT  PRESCRIBED (INTENTIONAL) Please choose reason not prescribed from choices below.     acetaminophen (TYLENOL) 500 MG tablet Take 2 tablets (1,000 mg) by mouth 3 times daily     aspirin (ASA) 81 MG chewable tablet Take 1 tablet (81 mg) by mouth daily     atorvastatin (LIPITOR) 20 MG tablet Take 0.5 tablets (10 mg) by mouth daily     CARBOXYMETHYLCELLULOSE SODIUM OP Place 1 drop into both eyes every 6 hours as needed     citalopram (CELEXA) 20 MG tablet TAKE 1 TAB BY MOUTH ONCE DAILY FOR DEPRESSION     levothyroxine (SYNTHROID/LEVOTHROID) 25 MCG tablet Take 1 tablet (25 mcg) by mouth daily     polyethylene glycol (MIRALAX) 17 g packet Take 17 g by mouth daily     senna-docusate (SENOKOT-S/PERICOLACE) 8.6-50 MG tablet Take 1 tablet by mouth 2 times daily     tamsulosin (FLOMAX) 0.4 MG capsule Take 1 capsule (0.4 mg) by mouth every evening     trolamine salicylate (ASPERCREME) 10 % external cream Apply topically 2 times daily     No current facility-administered medications for this visit.       ROS:  4 point ROS including Respiratory, CV, GI and , other than that noted in the HPI,  is negative    Vitals:  /66   Pulse 89   Temp 97.7  F (36.5  C)   Resp 18   SpO2 99%   Exam:  GENERAL APPEARANCE:  in no distress, appears healthy  ENT:  Mouth and posterior oropharynx normal, moist mucous membranes  RESP:  lungs clear to auscultation   CV:  no edema  ABDOMEN:  bowel sounds normal  M/S:   Gait and station abnormal WC mobility  SKIN:  Inspection of skin and subcutaneous tissue baseline  NEURO:   Examination of sensation by touch normal  PSYCH:  affect and mood normal    Lab/Diagnostic data:  Labs done in SNF are in [place] EPIC. Please refer to them using [employer]/Care Everywhere.    ASSESSMENT/PLAN:    (I49.5) Tachy-ashish syndrome (H)  (primary encounter diagnosis)  Comment: fall 10/3, sent to ER, admitted for tachycardia and pacemaker placed  Plan: follow up cardiology PRN  -continue ASA and statin  -staff to check  VS as scheduled    (E44.1) Mild protein-calorie malnutrition (H)  Comment: BMI 19.58, appears thin/frail  Plan: encourage intake  -dietary to follow up weights/supplements    (S43.005A) Dislocation of left shoulder joint, initial encounter  Comment: L shoulder mild pain, ROM improved  Plan: start aspercreme BID to L should  -continue APAP TID scheduled for now          Electronically signed by:  REBECCA Pisano CNP

## 2022-12-11 NOTE — TELEPHONE ENCOUNTER
[facility] GERIATRICS TELEPHONE ENCOUNTER    Name/: Sandeep Tapia (10/27/1934) nurse called to report patient having slurred speech, more weak, spilling drinks at breakfast. POLST at facility is for DNR/DNI but not comfort care. Initially planned to send him in. But when nurse called the family they opted to keep him comfortable at the nursing home.     ASSESSMENT/PLAN  Have SLP see him on Monday  Update POLST    Signed: Mary Dorantes NP

## 2022-12-12 PROBLEM — G45.9 TIA (TRANSIENT ISCHEMIC ATTACK): Status: ACTIVE | Noted: 2022-01-01

## 2022-12-12 PROBLEM — I63.9 CEREBROVASCULAR ACCIDENT (CVA), UNSPECIFIED MECHANISM (H): Status: ACTIVE | Noted: 2022-01-01

## 2022-12-12 PROBLEM — G81.91 RIGHT HEMIPLEGIA (H): Status: ACTIVE | Noted: 2022-01-01

## 2022-12-12 NOTE — TELEPHONE ENCOUNTER
[facility] NON-EMERGENT  ENCOUNTER    Sandeep Tapia is a 88 year old  (10/27/1934), phone call received today regarding: unwitnessed fall    Disposition    Pt fell out of his wheelchair. Pt reports hitting his head and asking to go to the ED. Pt was just put on comfort cares today and family does not want him to go to the ED. Pt had call light attached. Vitals: /60, P 60, T 97.9, O2 100% RA.     ORDER given to SNF from Kirby Fuentes NP    Do not need to send pt to ED. Focus on comfort cares      Electronically signed by:   Chrissy King RN

## 2022-12-12 NOTE — LETTER
12/12/2022        RE: Sandeep Tapia  [facility]  [address] [address]        [facility] GERIATRICS    Chief Complaint   Patient presents with     RECHECK     HPI:  Sandeep Tapia is a 88 year old  (10/27/1934), who is being seen today for an episodic care visit at: [facility] [[id number]]. Today's concern is:   1. Dementia without behavioral disturbance (H)    2. Cerebrovascular accident (CVA), unspecified mechanism (H)    3. TIA (transient ischemic attack)    4. Right hemiplegia (H)      Patient with recent progressive decline in physical and cognitive status, seen today with daughter Ellen for eval, over weekend had either CVA or TIA with symptoms not resolving; garbled speech at times, mild R hemiplegia, confused, goal is comfort and to not hospitalize, this NP called at 5pm today with new fall and potential head strike, family does not want ER/hospital visits (POLST redone today with no interventions and signed by Ellen), VS WNL, in no distress, progressive age related decline, of note recent fall, L shoulder dislocation, tachy-ashish with pacer placed and unresponsive episode.    Allergies, and PMH/PSH reviewed in EPIC today.  REVIEW OF SYSTEMS:  4 point ROS including Respiratory, CV, GI and , other than that noted in the HPI,  is negative    Advance Care Planning Goals of Care Discussion 12/12/2022  Goals of care discussed with Sandeep Tapia on 12/12. Present at discussion: patient, daughter Ellen. Questions discussed and patient response:  What is your understanding now of where you are with your illness?: all. How much information about what is likely to be ahead with your illness would you like to have?: all. As the clinician I communicated the following to the patient regarding their prognosis: good. If your health situation worsens, what are your most important goals?: no distress/pain. What are your biggest fears and worries about  "the future with your health?: NA. Unacceptable function : NA. What abilities are so critical to your life that you cannot imagine living without them?: NA. Pt does NOT want to: die yet. If you become sicker, how much are you willing to go through for the possibility of gaining more time?: not. Would this change if these were permanent states, if they did not get better?: no. How much does your agent and/or family know about your priorities and wishes?: everything. Added by Kirby Fuentes, REBECCA CNP        Objective:   /80   Pulse 64   Temp 98.1  F (36.7  C)   Resp 16   Ht 1.702 m (5' 7\")   Wt 56.2 kg (124 lb)   SpO2 96%   BMI 19.42 kg/m    GENERAL APPEARANCE:  in no distress, appears healthy  ENT:  Mouth and posterior oropharynx normal, moist mucous membranes  RESP:  lungs clear to auscultation   CV:  no edema  ABDOMEN:  bowel sounds normal  M/S:   Gait and station abnormal ADL assist  SKIN:  Inspection of skin and subcutaneous tissue baseline  NEURO:   Examination of sensation by touch normal  PSYCH:  affect and mood normal    Labs done in SNF are in [place] EPIC. Please refer to them using [employer]/Care Everywhere.    Assessment/Plan:  (F03.90) Dementia without behavioral disturbance (H)  (primary encounter diagnosis)  (I63.9) Cerebrovascular accident (CVA), unspecified mechanism (H)  (G45.9) TIA (transient ischemic attack)  (G81.91) Right hemiplegia (H)  Comment: progressive decline over past months, probable CVA or TIA, symptoms of garbled speech and R hemiplegia are not improving, comfort goal, NO hospitalizations  Plan: staff to support as indicated  -continue ASA, statin, celexa  -consider hospice per family approval    (Z71.89) ACP (advance care planning)  Comment: POLST completed, DNR with no interventions  Plan: [place] ADVANCE CARE PLANNING FIRST 30 MINS          I spent 16 minutes F2F with patient/daughter today in addition to todays visit completing a POLST form.    MED REC REQUIRED  Post " Medication Reconciliation Status: medication reconcilation previously completed during another office visit      Electronically signed by: REBECCA Pisano CNP           Sincerely,        REBECCA Pisano CNP

## 2022-12-12 NOTE — PROGRESS NOTES
[facility] GERIATRICS    Chief Complaint   Patient presents with     RECHECK     HPI:  Sandeep Tapia is a 88 year old  (10/27/1934), who is being seen today for an episodic care visit at: [facility]) [[id number]]. Today's concern is:   1. Dementia without behavioral disturbance (H)    2. Cerebrovascular accident (CVA), unspecified mechanism (H)    3. TIA (transient ischemic attack)    4. Right hemiplegia (H)      Patient with recent progressive decline in physical and cognitive status, seen today with daughter Ellen for eval, over weekend had either CVA or TIA with symptoms not resolving; garbled speech at times, mild R hemiplegia, confused, goal is comfort and to not hospitalize, this NP called at 5pm today with new fall and potential head strike, family does not want ER/hospital visits (POLST redone today with no interventions and signed by Ellen), VS WNL, in no distress, progressive age related decline, of note recent fall, L shoulder dislocation, tachy-ashish with pacer placed and unresponsive episode.    Allergies, and PMH/PSH reviewed in EPIC today.  REVIEW OF SYSTEMS:  4 point ROS including Respiratory, CV, GI and , other than that noted in the HPI,  is negative    Advance Care Planning Goals of Care Discussion 12/12/2022  Goals of care discussed with Sandeep Tapia on 12/12. Present at discussion: patient, jarod Hughes. Questions discussed and patient response:  What is your understanding now of where you are with your illness?: all. How much information about what is likely to be ahead with your illness would you like to have?: all. As the clinician I communicated the following to the patient regarding their prognosis: good. If your health situation worsens, what are your most important goals?: no distress/pain. What are your biggest fears and worries about the future with your health?: NA. Unacceptable function : NA. What abilities are so critical to your life that you  "cannot imagine living without them?: NA. Pt does NOT want to: die yet. If you become sicker, how much are you willing to go through for the possibility of gaining more time?: not. Would this change if these were permanent states, if they did not get better?: no. How much does your agent and/or family know about your priorities and wishes?: everything. Added by Kirby Fuentes, APRN CNP        Objective:   /80   Pulse 64   Temp 98.1  F (36.7  C)   Resp 16   Ht 1.702 m (5' 7\")   Wt 56.2 kg (124 lb)   SpO2 96%   BMI 19.42 kg/m    GENERAL APPEARANCE:  in no distress, appears healthy  ENT:  Mouth and posterior oropharynx normal, moist mucous membranes  RESP:  lungs clear to auscultation   CV:  no edema  ABDOMEN:  bowel sounds normal  M/S:   Gait and station abnormal ADL assist  SKIN:  Inspection of skin and subcutaneous tissue baseline  NEURO:   Examination of sensation by touch normal  PSYCH:  affect and mood normal    [facility]. Please refer to them using [employer]/Care Everywhere.    Assessment/Plan:  (F03.90) Dementia without behavioral disturbance (H)  (primary encounter diagnosis)  (I63.9) Cerebrovascular accident (CVA), unspecified mechanism (H)  (G45.9) TIA (transient ischemic attack)  (G81.91) Right hemiplegia (H)  Comment: progressive decline over past months, probable CVA or TIA, symptoms of garbled speech and R hemiplegia are not improving, comfort goal, NO hospitalizations  Plan: staff to support as indicated  -continue ASA, statin, celexa  -consider hospice per family approval    (Z71.89) ACP (advance care planning)  Comment: POLST completed, DNR with no interventions  Plan: [place] ADVANCE CARE PLANNING FIRST 30 MINS          I spent 16 minutes F2F with patient/daughter today in addition to todays visit completing a POLST form.    MED REC REQUIRED  Post Medication Reconciliation Status: medication reconcilation previously completed during another office " visit      Electronically signed by: REBECCA Pisano CNP

## 2022-12-13 NOTE — LETTER
12/13/2022        RE: Sandeep Chavez [facility]  [address] [address]        [facility] GERIATRICS  Chief Complaint   Patient presents with     alf Regulatory     [place] Medical Record Number:  [id number]  Place of Service where encounter took place:  [facility] ([facility])     HPI:    Sandeep Tapia  is 88 year old (10/27/1934), who is being seen today for a federally mandated E/M visit.     Course reviewed with nursing home staff.    Patient experienced an episode of dysarthria, right-sided weakness recently, he also had a fall 2 nights ago without obvious injury.  Staff notes persistent mild change in neuro status with disjointed speech and clumsiness right hand.  No significant dysphagia noted.  Family has requested comfort cares as a goal of care during these acute events    Patient states he feels fine, denies any specific physical concerns though history is limited by cognitive deficits    Medications were reviewed by me today:       Review of your medicines          Accurate as of December 13, 2022  8:09 AM. If you have any questions, ask your nurse or doctor.            CONTINUE these medicines which have NOT CHANGED      Dose / Directions   ACE/ARB/ARNI NOT PRESCRIBED  Commonly known as: INTENTIONAL  Used for: Hypertension, unspecified type      Please choose reason not prescribed from choices below.  Refills: 0     acetaminophen 500 MG tablet  Commonly known as: TYLENOL  Used for: Recurrent falls      Dose: 1,000 mg  Take 2 tablets (1,000 mg) by mouth 3 times daily  Quantity: 60 tablet  Refills: 11     aspirin 81 MG chewable tablet  Commonly known as: ASA  Used for: Chronic atrial fibrillation (H)      Dose: 81 mg  Take 1 tablet (81 mg) by mouth daily  Quantity: 30 tablet  Refills: 0     atorvastatin 20 MG tablet  Commonly known as: LIPITOR  Used for: History of CVA (cerebrovascular accident)      Dose: 10 mg  Take 0.5 tablets (10 mg) by  "mouth daily  Quantity: 30 tablet  Refills: 11     CARBOXYMETHYLCELLULOSE SODIUM OP      Dose: 1 drop  Place 1 drop into both eyes every 6 hours as needed  Refills: 0     citalopram 20 MG tablet  Commonly known as: celeXA  Used for: Mild episode of recurrent major depressive disorder (H)      TAKE 1 TAB BY MOUTH ONCE DAILY FOR DEPRESSION  Quantity: 30 tablet  Refills: 11     levothyroxine 25 MCG tablet  Commonly known as: SYNTHROID/LEVOTHROID  Used for: Hypothyroidism, unspecified type      Dose: 25 mcg  Take 1 tablet (25 mcg) by mouth daily  Quantity: 30 tablet  Refills: 11     polyethylene glycol 17 g packet  Commonly known as: MIRALAX      Dose: 17 g  Take 17 g by mouth daily  Refills: 0     senna-docusate 8.6-50 MG tablet  Commonly known as: SENOKOT-S/PERICOLACE      Dose: 1 tablet  Take 1 tablet by mouth 2 times daily  Refills: 0     tamsulosin 0.4 MG capsule  Commonly known as: FLOMAX  Used for: Benign prostatic hyperplasia without lower urinary tract symptoms      Dose: 0.4 mg  Take 1 capsule (0.4 mg) by mouth every evening  Quantity: 30 capsule  Refills: 11     * trolamine salicylate 10 % external cream  Commonly known as: ASPERCREME  Used for: Dislocation of left shoulder joint, initial encounter      Apply topically 2 times daily  Quantity: 1 g  Refills: 0     * trolamine salicylate 10 % external cream  Commonly known as: ASPERCREME  Used for: Dislocation of left shoulder joint, initial encounter      Apply topically 2 times daily  Quantity: 1 g  Refills: 0            Vitals:  /60   Pulse 68   Temp 97.9  F (36.6  C)   Resp 18   Ht 1.702 m (5' 7\")   Wt 56.2 kg (124 lb)   SpO2 97%   BMI 19.42 kg/m    Body mass index is 19.42 kg/m .  Exam:  Thin appearing male, alert, sitting the dining area.  In no distress  HEENT: Oral mucosa moist  Lungs clear  CV RRR  Abdomen soft  Neuro: Oriented to self and surroundings.  Mildly dysarthric.  No facial asymmetry.  No gross focal weakness this " morning    Lab/Diagnostic data:   No recent labs    ASSESSMENT/PLAN    Dementia, with recent decline in neurologic status, likely secondary to TIA versus CVA.  History of CVA, managed with aspirin and statin  As above, family desires comfort care as goal of care  Given progressive decline with clinical evidence of progression of cerebrovascular disease, hospice is likely appropriate if family desires  For now we will continue supportive treatment    History of A. fib, status post pacemaker placement  Heart rate controlled  Patient remains on aspirin, not a candidate for anticoagulation    History of left shoulder dislocation following fall.  This appears stable    Depression, on citalopram    Hypothyroidism, on tamsulosin      Brian Ruiz MD              Sincerely,        Brian Ruiz MD

## 2022-12-13 NOTE — PROGRESS NOTES
[facility] GERIATRICS  Chief Complaint   Patient presents with     [facility] Medical Record Number:  [id number]  Place of Service where encounter took place:  [facility]     HPI:    Sandeep Tapia  is 88 year old (10/27/1934), who is being seen today for a federally mandated E/M visit.     Course reviewed with [facility] home staff.    Patient experienced an episode of dysarthria, right-sided weakness recently, he also had a fall 2 nights ago without obvious injury.  Staff notes persistent mild change in neuro status with disjointed speech and clumsiness right hand.  No significant dysphagia noted.  Family has requested comfort cares as a goal of care during these acute events    Patient states he feels fine, denies any specific physical concerns though history is limited by cognitive deficits    Medications were reviewed by me today:       Review of your medicines          Accurate as of December 13, 2022  8:09 AM. If you have any questions, ask your nurse or doctor.            CONTINUE these medicines which have NOT CHANGED      Dose / Directions   ACE/ARB/ARNI NOT PRESCRIBED  Commonly known as: INTENTIONAL  Used for: Hypertension, unspecified type      Please choose reason not prescribed from choices below.  Refills: 0     acetaminophen 500 MG tablet  Commonly known as: TYLENOL  Used for: Recurrent falls      Dose: 1,000 mg  Take 2 tablets (1,000 mg) by mouth 3 times daily  Quantity: 60 tablet  Refills: 11     aspirin 81 MG chewable tablet  Commonly known as: ASA  Used for: Chronic atrial fibrillation (H)      Dose: 81 mg  Take 1 tablet (81 mg) by mouth daily  Quantity: 30 tablet  Refills: 0     atorvastatin 20 MG tablet  Commonly known as: LIPITOR  Used for: History of CVA (cerebrovascular accident)      Dose: 10 mg  Take 0.5 tablets (10 mg) by mouth daily  Quantity: 30 tablet  Refills: 11     CARBOXYMETHYLCELLULOSE SODIUM OP      Dose: 1 drop  Place 1 drop into  "both eyes every 6 hours as needed  Refills: 0     citalopram 20 MG tablet  Commonly known as: celeXA  Used for: Mild episode of recurrent major depressive disorder (H)      TAKE 1 TAB BY MOUTH ONCE DAILY FOR DEPRESSION  Quantity: 30 tablet  Refills: 11     levothyroxine 25 MCG tablet  Commonly known as: SYNTHROID/LEVOTHROID  Used for: Hypothyroidism, unspecified type      Dose: 25 mcg  Take 1 tablet (25 mcg) by mouth daily  Quantity: 30 tablet  Refills: 11     polyethylene glycol 17 g packet  Commonly known as: MIRALAX      Dose: 17 g  Take 17 g by mouth daily  Refills: 0     senna-docusate 8.6-50 MG tablet  Commonly known as: SENOKOT-S/PERICOLACE      Dose: 1 tablet  Take 1 tablet by mouth 2 times daily  Refills: 0     tamsulosin 0.4 MG capsule  Commonly known as: FLOMAX  Used for: Benign prostatic hyperplasia without lower urinary tract symptoms      Dose: 0.4 mg  Take 1 capsule (0.4 mg) by mouth every evening  Quantity: 30 capsule  Refills: 11     * trolamine salicylate 10 % external cream  Commonly known as: ASPERCREME  Used for: Dislocation of left shoulder joint, initial encounter      Apply topically 2 times daily  Quantity: 1 g  Refills: 0     * trolamine salicylate 10 % external cream  Commonly known as: ASPERCREME  Used for: Dislocation of left shoulder joint, initial encounter      Apply topically 2 times daily  Quantity: 1 g  Refills: 0            Vitals:  /60   Pulse 68   Temp 97.9  F (36.6  C)   Resp 18   Ht 1.702 m (5' 7\")   Wt 56.2 kg (124 lb)   SpO2 97%   BMI 19.42 kg/m    Body mass index is 19.42 kg/m .  Exam:  Thin appearing male, alert, sitting the dining area.  In no distress  HEENT: Oral mucosa moist  Lungs clear  CV RRR  Abdomen soft  Neuro: Oriented to self and surroundings.  Mildly dysarthric.  No facial asymmetry.  No gross focal weakness this morning    Lab/Diagnostic data:   No recent labs    ASSESSMENT/PLAN    Dementia, with recent decline in neurologic status, likely " secondary to TIA versus CVA.  History of CVA, managed with aspirin and statin  As above, family desires comfort care as goal of care  Given progressive decline with clinical evidence of progression of cerebrovascular disease, hospice is likely appropriate if family desires  For now we will continue supportive treatment    History of A. fib, status post pacemaker placement  Heart rate controlled  Patient remains on aspirin, not a candidate for anticoagulation    History of left shoulder dislocation following fall.  This appears stable    Depression, on citalopram    Hypothyroidism, on tamsulosin      Brian Ruiz MD

## 2022-12-15 PROBLEM — Z51.5 HOSPICE CARE PATIENT: Status: ACTIVE | Noted: 2022-01-01

## 2022-12-15 NOTE — LETTER
"    12/15/2022        RE: Sandeep Tapia  [facility]  [address] [address]        [facility] GERIATR[facility]    Chief Complaint   Patient presents with     RECHECK     HPI:  Sandeep Tapia is a 88 year old  (10/27/1934), who is being seen today for an episodic care visit at: [facility] ([facility]) [[id number]]. Today's concern is:   1. Cerebrovascular accident (CVA), unspecified mechanism (H)    2. TIA (transient ischemic attack)    3. Moderate protein-calorie malnutrition (H)    4. Confusion    5. Hospice care patient      Patient seen for follow up, recent s/s CVA/TIA with unresponsiveness, no apparent hemiplegia, 2 episodes of unresponsiveness, low BP's, BMI <20, limited intake, increased confusion with garbled words/difficulty word finding, still very pleasant ans smiles, having progressive normal age related decline over past few months, hospice referral to [facility], to admit today.    Allergies, and PMH/PSH reviewed in EPIC today.  REVIEW OF SYSTEMS:  4 point ROS including Respiratory, CV, GI and , other than that noted in the HPI,  is negative    Objective:   BP 98/62   Pulse 68   Temp 97.9  F (36.6  C)   Resp 18   Ht 1.702 m (5' 7\")   Wt 56.2 kg (124 lb)   SpO2 96%   BMI 19.42 kg/m    GENERAL APPEARANCE:  in no distress, thin  ENT:  Mouth and posterior oropharynx normal, moist mucous membranes  RESP:  lungs clear to auscultation   CV:  no edema  ABDOMEN:  bowel sounds normal  M/S:   Gait and station abnormal ADL assist  SKIN:  Inspection of skin and subcutaneous tissue baseline  NEURO:   Examination of sensation by touch normal  PSYCH:  affect and mood normal    Labs done in SNF are in [facility]. Please refer to them using [facility]/Care Everywhere.    Assessment/Plan:  (I63.9) Cerebrovascular accident (CVA), unspecified mechanism (H)  (primary encounter diagnosis)  (G45.9) TIA (transient ischemic attack)  (E44.0) Moderate protein-calorie malnutrition " (H)  (R41.0) Confusion  (Z51.5) Hospice care patient  Comment: recent unresponsive episodes, decreased appetite and intake, weight loss, increased confusion, BMI <20, signing on with [facility] today.  Plan:   -continue current medication regimen; appropriate  -no labs indicated  -staff to encourage intake  -hospice nurse to visit 1-2x/wk and PRN  -start hospice protocol regimen  -per family do NOT want ER/ hospitalization  -plan to follow up RE: status and med regimen in 1-2 weeks    MED REC REQUIRED  Post Medication Reconciliation Status: medication reconcilation previously completed during another office visit          Electronically signed by: REBECCA Pisano CNP           Sincerely,        REBECCA Pisano CNP

## 2022-12-15 NOTE — PROGRESS NOTES
"[facility]    Chief Complaint   Patient presents with     RECHECK     HPI:  Sandeep Tapia is a 88 year old  (10/27/1934), who is being seen today for an episodic care visit at: [facility] ([facility]) [[id number]]. Today's concern is:   1. Cerebrovascular accident (CVA), unspecified mechanism (H)    2. TIA (transient ischemic attack)    3. Moderate protein-calorie malnutrition (H)    4. Confusion    5. Hospice care patient      Patient seen for follow up, recent s/s CVA/TIA with unresponsiveness, no apparent hemiplegia, 2 episodes of unresponsiveness, low BP's, BMI <20, limited intake, increased confusion with garbled words/difficulty word finding, still very pleasant ans smiles, having progressive normal age related decline over past few months, hospice referral to [facility], to admit today.    Allergies, and PMH/PSH reviewed in EPIC today.  REVIEW OF SYSTEMS:  4 point ROS including Respiratory, CV, GI and , other than that noted in the HPI,  is negative    Objective:   BP 98/62   Pulse 68   Temp 97.9  F (36.6  C)   Resp 18   Ht 1.702 m (5' 7\")   Wt 56.2 kg (124 lb)   SpO2 96%   BMI 19.42 kg/m    GENERAL APPEARANCE:  in no distress, thin  ENT:  Mouth and posterior oropharynx normal, moist mucous membranes  RESP:  lungs clear to auscultation   CV:  no edema  ABDOMEN:  bowel sounds normal  M/S:   Gait and station abnormal ADL assist  SKIN:  Inspection of skin and subcutaneous tissue baseline  NEURO:   Examination of sensation by touch normal  PSYCH:  affect and mood normal    Labs done in SNF are in [place] EPIC. Please refer to them using EPIC/Care Everywhere.    Assessment/Plan:  (I63.9) Cerebrovascular accident (CVA), unspecified mechanism (H)  (primary encounter diagnosis)  (G45.9) TIA (transient ischemic attack)  (E44.0) Moderate protein-calorie malnutrition (H)  (R41.0) Confusion  (Z51.5) Hospice care patient  Comment: recent unresponsive episodes, decreased appetite and intake, weight " loss, increased confusion, BMI <20, [facility] on with [facility] today.  Plan:   -continue current medication regimen; appropriate  -no labs indicated  -staff to encourage intake  -hospice nurse to visit 1-2x/wk and PRN  -start hospice protocol regimen  -per family do NOT want ER/ hospitalization  -plan to follow up RE: status and med regimen in 1-2 weeks    MED REC REQUIRED  Post Medication Reconciliation Status: medication reconcilation previously completed during another office visit          Electronically signed by: REBECCA Pisano CNP

## 2022-12-29 NOTE — LETTER
"    12/29/2022        RE: Sandeep Tapia  [facility]  [address] [address]        [facility] GERIATRICS    Chief Complaint   Patient presents with     RECHECK     HPI:  Sandeep Tapia is a 88 year old  (10/27/1934), who is being seen today for an episodic care visit at: [facility] ([facility]) [[id number]]. Today's concern is:   1. Cerebrovascular accident (CVA), unspecified mechanism (H)    2. Dementia without behavioral disturbance, unspecified dementia type    3. Hospice care patient      Patient seen for follow up, last week was sitting up, eating, appeared thin/frail, verbally intact, today in broda chair, requires feeding, verbal difficult to understand, considering potential CVA or TIA may have occurred, some difficulty swallowing and choking with regular diet so changed, drooling, smiles, interactive, no distress, overall increasingly progressive age related decline.     Allergies, and PMH/PSH reviewed in EPIC today.  REVIEW OF SYSTEMS:  Unobtainable secondary to aphasia.     Objective:   /67   Pulse 76   Temp 98.2  F (36.8  C)   Resp 16   Ht 1.702 m (5' 7\")   Wt 51.7 kg (114 lb)   SpO2 100%   BMI 17.85 kg/m    GENERAL APPEARANCE:  in no distress, thin  ENT:  Mouth and posterior oropharynx normal, moist mucous membranes  RESP:  lungs clear to auscultation   CV:  no edema  ABDOMEN:  bowel sounds normal  M/S:   Gait and station abnormal full ADL assist  SKIN:  Inspection of skin and subcutaneous tissue baseline  NEURO:   Examination of sensation by touch normal  PSYCH:  affect and mood normal    Labs done in SNF are in [facility]. Please refer to them using EPIC/Care Everywhere.    Assessment/Plan:  (I63.9) Cerebrovascular accident (CVA), unspecified mechanism (H)  (primary encounter diagnosis)  (F03.90) Dementia without behavioral disturbance, unspecified dementia type  (Z51.5) Hospice care patient  Comment: signed on with [facility], progressive " decline, increased care needs over past week  Plan:   -hospice nurse to visit 2-3x/wk and PRN as status declines  -staff to support as indicated  -change diet to nectar thick; OK feed liquids via spoon  -reposition PRN  -discontinue all pill form medications  -hospice medications reconciled and appropriate  -plan to follow up RE: status and plan in 1-2 weeks    MED REC REQUIRED  Post Medication Reconciliation Status: medication reconcilation previously completed during another office visit        Electronically signed by: REBECCA Pisano CNP           Sincerely,        REBECCA Pisano CNP

## 2022-12-29 NOTE — PROGRESS NOTES
"[facility] GERIATRICS    Chief Complaint   Patient presents with     RECHECK     HPI:  Sandeep Tapia is a 88 year old  (10/27/1934), who is being seen today for an episodic care visit at: [facility] ([facility]) [[id number]]. Today's concern is:   1. Cerebrovascular accident (CVA), unspecified mechanism (H)    2. Dementia without behavioral disturbance, unspecified dementia type    3. Hospice care patient      Patient seen for follow up, last week was sitting up, eating, appeared thin/frail, verbally intact, today in broda chair, requires feeding, verbal difficult to understand, considering potential CVA or TIA may have occurred, some difficulty swallowing and choking with regular diet so changed, drooling, smiles, interactive, no distress, overall increasingly progressive age related decline.     Allergies, and PMH/PSH reviewed in EPIC today.  REVIEW OF SYSTEMS:  Unobtainable secondary to aphasia.     Objective:   /67   Pulse 76   Temp 98.2  F (36.8  C)   Resp 16   Ht 1.702 m (5' 7\")   Wt 51.7 kg (114 lb)   SpO2 100%   BMI 17.85 kg/m    GENERAL APPEARANCE:  in no distress, thin  ENT:  Mouth and posterior oropharynx normal, moist mucous membranes  RESP:  lungs clear to auscultation   CV:  no edema  ABDOMEN:  bowel sounds normal  M/S:   Gait and station abnormal full ADL assist  SKIN:  Inspection of skin and subcutaneous tissue baseline  NEURO:   Examination of sensation by touch normal  PSYCH:  affect and mood normal    Labs done in SNF are in [place] EPIC. Please refer to them using [facility]/Care Everywhere.    Assessment/Plan:  (I63.9) Cerebrovascular accident (CVA), unspecified mechanism (H)  (primary encounter diagnosis)  (F03.90) Dementia without behavioral disturbance, unspecified dementia type  (Z51.5) Hospice care patient  Comment: signed on with [facility], progressive decline, increased care needs over past week  Plan:   -hospice nurse to visit 2-3x/wk and PRN as status " declines  -staff to support as indicated  -change diet to nectar thick; OK feed liquids via spoon  -reposition PRN  -discontinue all pill form medications  -hospice medications reconciled and appropriate  -plan to follow up RE: status and plan in 1-2 weeks    MED REC REQUIRED  Post Medication Reconciliation Status: medication reconcilation previously completed during another office visit        Electronically signed by: REBECCA Pisano CNP

## 2023-04-24 NOTE — PROGRESS NOTES
From: Migdalia Betancourt  To: Carleen Flores  Sent: 4/24/2023 12:29 PM CDT  Subject: Meds     I was just told you left the practice and what should I do ?   [facility]  [place] Medical Record Number:  [id number]  Place of Service where encounter took place: [facility] [[id number]]    HPI:    Sandeep Tapia is a 86 year old  (10/27/1934), who is being seen today for an episodic care visit at the above location.   HPI information obtained from: facility chart records, facility staff, patient report and [facility] Epic chart review. Today's concern is INR/Coumadin management for A. Fib    ROS/Subjective:  Bleeding Signs/Symptoms:  None  Thromboembolic Signs/Symptoms:  None  Medication Changes:  No  Dietary Changes:  No  Activity Changes: No  Bacterial/Viral Infection:  No  Missed Coumadin Doses:  None  On ASA: No  Other Concerns:  No    OBJECTIVE:  /65   Pulse 60   Temp 99.1  F (37.3  C)   Resp 18   Wt 69.4 kg (153 lb)   SpO2 97%   BMI 23.26 kg/m    Last INR: 2.0 on 3/22  INR Today:  1.8  Current Dose:  5mg MWF, 2.5mg ROW  INR Flow sheet at SNF:    ASSESSMENT:     Chronic atrial fibrillation (H)  Encounter for therapeutic drug monitoring  Long term current use of anticoagulant therapy  Therapeutic INR for goal of 2-3    PLAN:   Orders written by provider at facility  New Dose: 5mg MWF, 2.5mg ROW    Next INR: 4/5      Electronically signed by:  REBECCA Pisano CNP